# Patient Record
Sex: FEMALE | Race: WHITE | NOT HISPANIC OR LATINO
[De-identification: names, ages, dates, MRNs, and addresses within clinical notes are randomized per-mention and may not be internally consistent; named-entity substitution may affect disease eponyms.]

---

## 2017-07-22 ENCOUNTER — TRANSCRIPTION ENCOUNTER (OUTPATIENT)
Age: 47
End: 2017-07-22

## 2018-05-03 ENCOUNTER — TRANSCRIPTION ENCOUNTER (OUTPATIENT)
Age: 48
End: 2018-05-03

## 2018-11-19 ENCOUNTER — TRANSCRIPTION ENCOUNTER (OUTPATIENT)
Age: 48
End: 2018-11-19

## 2019-02-18 ENCOUNTER — TRANSCRIPTION ENCOUNTER (OUTPATIENT)
Age: 49
End: 2019-02-18

## 2019-04-21 ENCOUNTER — TRANSCRIPTION ENCOUNTER (OUTPATIENT)
Age: 49
End: 2019-04-21

## 2019-09-08 ENCOUNTER — EMERGENCY (EMERGENCY)
Facility: HOSPITAL | Age: 49
LOS: 1 days | Discharge: HOME | End: 2019-09-08
Attending: EMERGENCY MEDICINE | Admitting: EMERGENCY MEDICINE
Payer: COMMERCIAL

## 2019-09-08 VITALS
WEIGHT: 229.94 LBS | SYSTOLIC BLOOD PRESSURE: 145 MMHG | OXYGEN SATURATION: 100 % | DIASTOLIC BLOOD PRESSURE: 81 MMHG | HEIGHT: 71 IN | TEMPERATURE: 98 F | HEART RATE: 82 BPM | RESPIRATION RATE: 18 BRPM

## 2019-09-08 LAB
ALBUMIN SERPL ELPH-MCNC: 3.7 G/DL — SIGNIFICANT CHANGE UP (ref 3.3–5)
ALP SERPL-CCNC: 104 U/L — SIGNIFICANT CHANGE UP (ref 40–120)
ALT FLD-CCNC: 25 U/L — SIGNIFICANT CHANGE UP (ref 10–45)
ANION GAP SERPL CALC-SCNC: 11 MMOL/L — SIGNIFICANT CHANGE UP (ref 5–17)
APPEARANCE UR: CLEAR — SIGNIFICANT CHANGE UP
APTT BLD: 34.7 SEC — SIGNIFICANT CHANGE UP (ref 27.5–36.3)
AST SERPL-CCNC: 26 U/L — SIGNIFICANT CHANGE UP (ref 10–40)
BACTERIA # UR AUTO: ABNORMAL /HPF
BILIRUB SERPL-MCNC: 0.3 MG/DL — SIGNIFICANT CHANGE UP (ref 0.2–1.2)
BILIRUB UR-MCNC: NEGATIVE — SIGNIFICANT CHANGE UP
BUN SERPL-MCNC: 18 MG/DL — SIGNIFICANT CHANGE UP (ref 7–23)
CALCIUM SERPL-MCNC: 9.7 MG/DL — SIGNIFICANT CHANGE UP (ref 8.4–10.5)
CHLORIDE SERPL-SCNC: 102 MMOL/L — SIGNIFICANT CHANGE UP (ref 96–108)
CO2 SERPL-SCNC: 26 MMOL/L — SIGNIFICANT CHANGE UP (ref 22–31)
COLOR SPEC: YELLOW — SIGNIFICANT CHANGE UP
CREAT SERPL-MCNC: 0.9 MG/DL — SIGNIFICANT CHANGE UP (ref 0.5–1.3)
D DIMER BLD IA.RAPID-MCNC: 152 NG/ML DDU — SIGNIFICANT CHANGE UP
DIFF PNL FLD: NEGATIVE — SIGNIFICANT CHANGE UP
EPI CELLS # UR: ABNORMAL
GLUCOSE SERPL-MCNC: 123 MG/DL — HIGH (ref 70–99)
GLUCOSE UR QL: NEGATIVE — SIGNIFICANT CHANGE UP
HCT VFR BLD CALC: 43.2 % — SIGNIFICANT CHANGE UP (ref 34.5–45)
HGB BLD-MCNC: 14.1 G/DL — SIGNIFICANT CHANGE UP (ref 11.5–15.5)
INR BLD: 1.15 RATIO — SIGNIFICANT CHANGE UP (ref 0.88–1.16)
KETONES UR-MCNC: NEGATIVE — SIGNIFICANT CHANGE UP
LEUKOCYTE ESTERASE UR-ACNC: ABNORMAL
MCHC RBC-ENTMCNC: 26.7 PG — LOW (ref 27–34)
MCHC RBC-ENTMCNC: 32.6 GM/DL — SIGNIFICANT CHANGE UP (ref 32–36)
MCV RBC AUTO: 81.7 FL — SIGNIFICANT CHANGE UP (ref 80–100)
NITRITE UR-MCNC: NEGATIVE — SIGNIFICANT CHANGE UP
NRBC # BLD: 0 /100 WBCS — SIGNIFICANT CHANGE UP (ref 0–0)
PH UR: 8 — SIGNIFICANT CHANGE UP (ref 5–8)
PLATELET # BLD AUTO: 239 K/UL — SIGNIFICANT CHANGE UP (ref 150–400)
POTASSIUM SERPL-MCNC: 4 MMOL/L — SIGNIFICANT CHANGE UP (ref 3.5–5.3)
POTASSIUM SERPL-SCNC: 4 MMOL/L — SIGNIFICANT CHANGE UP (ref 3.5–5.3)
PROT SERPL-MCNC: 8 G/DL — SIGNIFICANT CHANGE UP (ref 6–8.3)
PROT UR-MCNC: 30 MG/DL
PROTHROM AB SERPL-ACNC: 12.9 SEC — SIGNIFICANT CHANGE UP (ref 10–12.9)
RBC # BLD: 5.29 M/UL — HIGH (ref 3.8–5.2)
RBC # FLD: 14.1 % — SIGNIFICANT CHANGE UP (ref 10.3–14.5)
RBC CASTS # UR COMP ASSIST: SIGNIFICANT CHANGE UP /HPF (ref 0–4)
SODIUM SERPL-SCNC: 139 MMOL/L — SIGNIFICANT CHANGE UP (ref 135–145)
SP GR SPEC: 1.01 — SIGNIFICANT CHANGE UP (ref 1.01–1.02)
TROPONIN I SERPL-MCNC: <.017 NG/ML — LOW (ref 0.02–0.06)
UROBILINOGEN FLD QL: NEGATIVE — SIGNIFICANT CHANGE UP
WBC # BLD: 10.43 K/UL — SIGNIFICANT CHANGE UP (ref 3.8–10.5)
WBC # FLD AUTO: 10.43 K/UL — SIGNIFICANT CHANGE UP (ref 3.8–10.5)
WBC UR QL: ABNORMAL /HPF (ref 0–5)

## 2019-09-08 PROCEDURE — 71045 X-RAY EXAM CHEST 1 VIEW: CPT

## 2019-09-08 PROCEDURE — 99285 EMERGENCY DEPT VISIT HI MDM: CPT

## 2019-09-08 PROCEDURE — 85027 COMPLETE CBC AUTOMATED: CPT

## 2019-09-08 PROCEDURE — 85379 FIBRIN DEGRADATION QUANT: CPT

## 2019-09-08 PROCEDURE — 85610 PROTHROMBIN TIME: CPT

## 2019-09-08 PROCEDURE — 96374 THER/PROPH/DIAG INJ IV PUSH: CPT

## 2019-09-08 PROCEDURE — 85730 THROMBOPLASTIN TIME PARTIAL: CPT

## 2019-09-08 PROCEDURE — 93005 ELECTROCARDIOGRAM TRACING: CPT

## 2019-09-08 PROCEDURE — 93010 ELECTROCARDIOGRAM REPORT: CPT

## 2019-09-08 PROCEDURE — 81001 URINALYSIS AUTO W/SCOPE: CPT

## 2019-09-08 PROCEDURE — 80053 COMPREHEN METABOLIC PANEL: CPT

## 2019-09-08 PROCEDURE — 84484 ASSAY OF TROPONIN QUANT: CPT

## 2019-09-08 PROCEDURE — 71045 X-RAY EXAM CHEST 1 VIEW: CPT | Mod: 26

## 2019-09-08 PROCEDURE — 99284 EMERGENCY DEPT VISIT MOD MDM: CPT | Mod: 25

## 2019-09-08 PROCEDURE — 87086 URINE CULTURE/COLONY COUNT: CPT

## 2019-09-08 RX ORDER — DIAZEPAM 5 MG
1 TABLET ORAL
Qty: 8 | Refills: 0
Start: 2019-09-08 | End: 2019-09-11

## 2019-09-08 RX ORDER — KETOROLAC TROMETHAMINE 30 MG/ML
30 SYRINGE (ML) INJECTION ONCE
Refills: 0 | Status: DISCONTINUED | OUTPATIENT
Start: 2019-09-08 | End: 2019-09-08

## 2019-09-08 RX ORDER — DIAZEPAM 5 MG
5 TABLET ORAL ONCE
Refills: 0 | Status: DISCONTINUED | OUTPATIENT
Start: 2019-09-08 | End: 2019-09-08

## 2019-09-08 RX ORDER — MELOXICAM 15 MG/1
1 TABLET ORAL
Qty: 14 | Refills: 0
Start: 2019-09-08 | End: 2019-09-21

## 2019-09-08 RX ADMIN — Medication 5 MILLIGRAM(S): at 16:56

## 2019-09-08 RX ADMIN — Medication 30 MILLIGRAM(S): at 17:08

## 2019-09-08 NOTE — ED PROVIDER NOTE - PHYSICAL EXAMINATION
Gen:  alert, awake, no acute distress  Head:  atraumatic, normocephalic  HEENT: PERRLA, EOMI, normal nose, normal oropharynx, no tonsillar edema, erythema, or exudate  CV:  rrr, nl S1, S2, no m/r/g  Pulm:  lungs CTA b/l  Abd: s/nt/nd, +BS  MSK:  moving all extremities, no back midline ttp, no stepoffs, no cva TTP; ttp trapezius  Neuro:  grossly intact, no focal deficits  Skin:  clear, dry, intact  Psych: AOx3, normal affect, no apparent risk to self or others

## 2019-09-08 NOTE — ED PROVIDER NOTE - OBJECTIVE STATEMENT
47 y/o F with h/o severe anxiety pw right upper and lower back muscle spasming sudden onset upon awakening today at 11:30am. States h/o muscle spasms in which she take Robaxin for but never this severe worse with inspiration. Denies chest pain, shortness of breath, abdominal pain, nausea, vomiting, dysuria, hematuria, fever, chills, cough, recent illness, recent travel.   PMD- Shani ([facility])  Nonsmoker  No OCPS 49 y/o F with h/o severe anxiety pw right upper and lower back muscle spasming sudden onset upon awakening today at 11:30am. States h/o muscle spasms in which she take Robaxin for but never this severe worse with inspiration. Denies chest pain, shortness of breath, abdominal pain, nausea, vomiting, dysuria, hematuria, fever, chills, cough, recent illness, recent travel. pain primarily in trapezius region. no urinary symptoms.  PMD- Shani ([facility])  Nonsmoker  No OCPS

## 2019-09-08 NOTE — ED PROVIDER NOTE - ATTENDING CONTRIBUTION TO CARE
Dr. Botello: I performed a face to face bedside interview with patient regarding history of present illness, review of symptoms and past medical history. I completed an independent physical exam.  I have discussed patient's plan of care with PA.   I agree with note as stated above, having amended the EMR as needed to reflect my findings.   This includes HISTORY OF PRESENT ILLNESS, HIV, PAST MEDICAL/SURGICAL/FAMILY/SOCIAL HISTORY, ALLERGIES AND HOME MEDICATIONS, REVIEW OF SYSTEMS, PHYSICAL EXAM, and any PROGRESS NOTES during the time I functioned as the attending physician for this patient.    dr botello: 49 y/o F with h/o severe anxiety pw right upper and lower back muscle spasming sudden onset upon awakening today at 11:30am. States h/o muscle spasms in which she take Robaxin for but never this severe worse with inspiration likely MSK- Will obtain basic labs with Trop and D-dimer, EKG, CXR, administer Valium and reassess. If D-dimer elevated will obtain CTA chest

## 2019-09-08 NOTE — ED PROVIDER NOTE - PROGRESS NOTE DETAILS
PA Tiberio- back pain improved with valium. Trop and D-dimer negative. UA with moderate leuks- pt without urinary symptoms. UCX sent to lab. CXR- NAPF. Will DC home on Mobic and valium with PMD follow up

## 2019-09-08 NOTE — ED ADULT TRIAGE NOTE - CHIEF COMPLAINT QUOTE
Pt c/o right sided rib/shoulder area pain , increases with deep breathing, denies injury, pt stated she took Zoloft 25 mg and 2 500 mg tylenol around 12 pm

## 2019-09-08 NOTE — ED ADULT NURSE NOTE - OBJECTIVE STATEMENT
48 yr old female to ED A&Ox3 presents with +rt rib pain that radiates to back. Pain increases with inspiration. Pt denies any injury or trauma. Pt does report that she was working out yesterday and woke up sore this am. Denies any numbness or tingling to ext. No urinary or bowel incontinence.  No acute resp distress noted. Resp even and unlabored. Abd soft, NT, ND. +BS. +PP. FERGUSON. Skin warm, dry and intact. Safety maintained.

## 2019-09-08 NOTE — ED PROVIDER NOTE - INTERPRETATION
normal sinus rhythm normal sinus rhythm, Normal axis, Normal MO interval and QRS complex. There are no acute ischemic ST or T-wave changes.

## 2019-09-08 NOTE — ED PROVIDER NOTE - NSFOLLOWUPINSTRUCTIONS_ED_ALL_ED_FT
Follow with your PMD within 48-72 hours.    Rest, no heavy lifting.    Warm compresses to area.  Light walking.   Take Mobic 15mg daily with food   Valium 5 mg every 12 hours as needed for muscle spasm- caution drowsiness/do not drive.   Worsening, continued or ANY new concerning symptoms return to the emergency department                                                                  Back Pain    WHAT YOU NEED TO KNOW:    What do I need to know about back pain? Back pain is common. You may feel sore or stiff on one or both sides of your back. The pain may spread to your buttocks or thighs.    What causes or increases my risk for back pain? Conditions that affect the spine, joints, or muscles can cause back pain. These may include arthritis, spinal stenosis (narrowing of the spinal column), muscle tension, or breakdown of the spinal discs. The following increase your risk of back pain:     Repeated bending, lifting, or twisting, or lifting heavy items      Injury from a fall or accident      Lack of regular physical activity       Obesity or pregnancy       Smoking      Aging      Driving, sitting, or standing for long periods      Bad posture while sitting or standing    How is back pain diagnosed? Your healthcare provider will ask if you have any medical conditions. He or she may ask if you have a history of back pain and how it started. He or she may watch you stand and walk, and check your range of motion. Show him or her where you feel pain and what makes it better or worse. Describe the pain, how bad it is, and how long it lasts. Tell your provider if your pain worsens at night or when you lie on your back.    How is back pain treated?    NSAIDs help decrease swelling and pain. This medicine is available with or without a doctor's order. NSAIDs can cause stomach bleeding or kidney problems in certain people. If you take blood thinner medicine, always ask your healthcare provider if NSAIDs are safe for you. Always read the medicine label and follow directions.      Acetaminophen decreases pain and fever. It is available without a doctor's order. Ask how much to take and how often to take it. Follow directions. Read the labels of all other medicines you are using to see if they also contain acetaminophen, or ask your doctor or pharmacist. Acetaminophen can cause liver damage if not taken correctly. Do not use more than 4 grams (4,000 milligrams) total of acetaminophen in one day.       Muscle relaxers help decrease muscle spasms and back pain.      Prescription pain medicine may be given. Ask your healthcare provider how to take this medicine safely. Some prescription pain medicines contain acetaminophen. Do not take other medicines that contain acetaminophen without talking to your healthcare provider. Too much acetaminophen may cause liver damage. Prescription pain medicine may cause constipation. Ask your healthcare provider how to prevent or treat constipation.     How do I manage my back pain?     Apply ice on your back for 15 to 20 minutes every hour or as directed. Use an ice pack, or put crushed ice in a plastic bag. Cover it with a towel before you apply it to your skin. Ice helps prevent tissue damage and decreases pain.      Apply heat on your back for 20 to 30 minutes every 2 hours for as many days as directed. Heat helps decrease pain and muscle spasms.      Stay active as much as you can without causing more pain. Bed rest could make your back pain worse. Avoid heavy lifting until your pain is gone.      Go to physical therapy as directed. A physical therapist can teach you exercises to help improve movement and strength, and to decrease pain.    When should I seek immediate care?     You have pain, numbness, or weakness in one or both legs.      Your pain becomes so severe that you cannot walk.      You cannot control your urine or bowel movements.      You have severe back pain with chest pain.      You have severe back pain, nausea, and vomiting.      You have severe back pain that spreads to your side or genital area.    When should I contact my healthcare provider?     You have back pain that does not get better with rest and pain medicine.      You have a fever.      You have pain that worsens when you are on your back or when you rest.      You have pain that worsens when you cough or sneeze.      You lose weight without trying.      You have questions or concerns about your condition or care.

## 2019-09-08 NOTE — ED PROVIDER NOTE - PATIENT PORTAL LINK FT
You can access the FollowMyHealth Patient Portal offered by [facility] by registering at the following website: [email or web address]. By joining [employer]’s FollowMyHealth portal, you will also be able to view your health information using other applications (apps) compatible with our system.

## 2019-09-08 NOTE — ED PROVIDER NOTE - CLINICAL SUMMARY MEDICAL DECISION MAKING FREE TEXT BOX
49 y/o F with h/o severe anxiety pw right upper and lower back muscle spasming sudden onset upon awakening today at 11:30am. States h/o muscle spasms in which she take Robaxin for but never this severe worse with inspiration likely MSK- Will obtain basic labs with Trop and D-dimer, EKG, CXR, administer Valium and reassess. If D-dimer elevated will obtain CTA chest

## 2019-09-09 ENCOUNTER — EMERGENCY (EMERGENCY)
Facility: HOSPITAL | Age: 49
LOS: 1 days | Discharge: HOME | End: 2019-09-09
Attending: EMERGENCY MEDICINE | Admitting: EMERGENCY MEDICINE
Payer: COMMERCIAL

## 2019-09-09 VITALS
DIASTOLIC BLOOD PRESSURE: 80 MMHG | TEMPERATURE: 99 F | SYSTOLIC BLOOD PRESSURE: 149 MMHG | OXYGEN SATURATION: 98 % | RESPIRATION RATE: 18 BRPM | WEIGHT: 237.88 LBS | HEIGHT: 71 IN | HEART RATE: 82 BPM

## 2019-09-09 VITALS
OXYGEN SATURATION: 100 % | HEART RATE: 76 BPM | RESPIRATION RATE: 16 BRPM | SYSTOLIC BLOOD PRESSURE: 147 MMHG | DIASTOLIC BLOOD PRESSURE: 79 MMHG

## 2019-09-09 LAB
CULTURE RESULTS: SIGNIFICANT CHANGE UP
SPECIMEN SOURCE: SIGNIFICANT CHANGE UP

## 2019-09-09 PROCEDURE — 99283 EMERGENCY DEPT VISIT LOW MDM: CPT

## 2019-09-09 RX ORDER — IBUPROFEN 200 MG
600 TABLET ORAL ONCE
Refills: 0 | Status: COMPLETED | OUTPATIENT
Start: 2019-09-09 | End: 2019-09-09

## 2019-09-09 RX ORDER — ACETAMINOPHEN 500 MG
975 TABLET ORAL ONCE
Refills: 0 | Status: COMPLETED | OUTPATIENT
Start: 2019-09-09 | End: 2019-09-09

## 2019-09-09 RX ORDER — DIAZEPAM 5 MG
5 TABLET ORAL ONCE
Refills: 0 | Status: DISCONTINUED | OUTPATIENT
Start: 2019-09-09 | End: 2019-09-09

## 2019-09-09 RX ADMIN — Medication 5 MILLIGRAM(S): at 01:39

## 2019-09-09 RX ADMIN — Medication 975 MILLIGRAM(S): at 02:09

## 2019-09-09 RX ADMIN — Medication 975 MILLIGRAM(S): at 01:39

## 2019-09-09 RX ADMIN — Medication 600 MILLIGRAM(S): at 02:10

## 2019-09-09 RX ADMIN — Medication 600 MILLIGRAM(S): at 01:40

## 2019-09-09 NOTE — ED ADULT NURSE NOTE - OBJECTIVE STATEMENT
Pt came to ED c/o right upper back pain radiating to RUQ. Pt was seen in ED yesterday and treated for same symptoms and d/c home with medications. Pt states pain is still there and is worse with movement. Pt denies sob, cp, n/v at this time.

## 2019-09-09 NOTE — ED PROVIDER NOTE - PROGRESS NOTE DETAILS
pt has been sleeping ever since arrival pt still feeling very anxious about going home, pt reassured, states that she is feeling better now but worried that pain will come back, pt encouraged to take her Zoloft and her mobic and valium at home.  Abdominal exam remains benign, no ttp, pt able to move an sit up much more easily.

## 2019-09-09 NOTE — ED PROVIDER NOTE - CLINICAL SUMMARY MEDICAL DECISION MAKING FREE TEXT BOX
pt with return trip for most certainly muscle spasm, but pt is very anxious that it might be something else, pt has pain on movement and deep inspiration, vitals normal.

## 2019-09-09 NOTE — ED PROVIDER NOTE - NSFOLLOWUPINSTRUCTIONS_ED_ALL_ED_FT
-- Follow up with your primary care physician in 48 hours.    -- Return to the ER for worsening or persistent symptoms, and/or ANY NEW OR CONCERNING SYMPTOMS.    -- If you have difficulty following up, please call: [phone] to obtain a [facility] doctor or specialist who takes your insurance in your area.

## 2019-09-09 NOTE — ED ADULT TRIAGE NOTE - CHIEF COMPLAINT QUOTE
I have pain in my right upper abdominal area and right shoulder pain. I was here yesterday for the same and gave me Mobic"

## 2019-09-09 NOTE — ED PROVIDER NOTE - PATIENT PORTAL LINK FT
You can access the FollowMyHealth Patient Portal offered by [facility] by registering at the following website: [email or web address]. By joining [employer]’s FollowMyHealth portal, you will also be able to view your health information using other applications (apps) compatible with our system.

## 2019-09-13 DIAGNOSIS — R07.81 PLEURODYNIA: ICD-10-CM

## 2019-09-14 DIAGNOSIS — R10.9 UNSPECIFIED ABDOMINAL PAIN: ICD-10-CM

## 2020-01-03 ENCOUNTER — TRANSCRIPTION ENCOUNTER (OUTPATIENT)
Age: 50
End: 2020-01-03

## 2020-02-18 ENCOUNTER — TRANSCRIPTION ENCOUNTER (OUTPATIENT)
Age: 50
End: 2020-02-18

## 2020-11-03 ENCOUNTER — EMERGENCY (EMERGENCY)
Facility: HOSPITAL | Age: 50
LOS: 1 days | Discharge: HOME | End: 2020-11-03
Attending: EMERGENCY MEDICINE | Admitting: EMERGENCY MEDICINE
Payer: COMMERCIAL

## 2020-11-03 VITALS
DIASTOLIC BLOOD PRESSURE: 94 MMHG | SYSTOLIC BLOOD PRESSURE: 184 MMHG | WEIGHT: 235.01 LBS | HEIGHT: 71 IN | TEMPERATURE: 98 F | OXYGEN SATURATION: 100 % | RESPIRATION RATE: 19 BRPM | HEART RATE: 106 BPM

## 2020-11-03 VITALS
RESPIRATION RATE: 19 BRPM | DIASTOLIC BLOOD PRESSURE: 68 MMHG | SYSTOLIC BLOOD PRESSURE: 148 MMHG | HEART RATE: 100 BPM | OXYGEN SATURATION: 100 %

## 2020-11-03 DIAGNOSIS — I10 ESSENTIAL (PRIMARY) HYPERTENSION: ICD-10-CM

## 2020-11-03 PROCEDURE — 99283 EMERGENCY DEPT VISIT LOW MDM: CPT

## 2020-11-03 RX ORDER — HYDROXYZINE HCL 10 MG
12.5 TABLET ORAL ONCE
Refills: 0 | Status: COMPLETED | OUTPATIENT
Start: 2020-11-03 | End: 2020-11-03

## 2020-11-03 RX ADMIN — Medication 12.5 MILLIGRAM(S): at 20:16

## 2020-11-03 NOTE — ED ADULT NURSE NOTE - OBJECTIVE STATEMENT
Pt comes in stating she was told by her PCP she is borderline HTN and to diet, She states she has not really done that but also suffers with anxiety. She states today she wasn't feeling well and at work her coworker took her BP and it was 170's. Having higher BP makes her anxious which probably isn't helping her bp. She states she feels a bit sob, and shaky which is something she feels with anxiety. No CP, fever, chills, n/v/d. No other symptoms. Took zoloft today and a baby aspirin

## 2020-11-03 NOTE — ED PROVIDER NOTE - OBJECTIVE STATEMENT
48 y/o 48 y/o F with PMH of anxiety on zoloft and hydroxyzine, borderline HTN (not on meds) presents to the ED for elevated BP (172/92's) while at work today. 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 reports she gets anxious every time her BP is being check and the cuff tightens. She denies CP, SOB, HA, dizziness, visual changes, extremity weakness, abd pain, n/v, f.c or all other complaints

## 2020-11-03 NOTE — ED ADULT NURSE NOTE - CHPI ED NUR SYMPTOMS NEG
no nausea/no fever/no chest pain/no chills/no back pain/no diaphoresis/no syncope/no vomiting/no dizziness/no congestion

## 2020-11-03 NOTE — ED PROVIDER NOTE - PATIENT PORTAL LINK FT
You can access the FollowMyHealth Patient Portal offered by [facility] by registering at the following website: [email or web address]. By joining [employer]’s FollowMyHealth portal, you will also be able to view your health information using other applications (apps) compatible with our system.

## 2020-11-03 NOTE — ED PROVIDER NOTE - CLINICAL SUMMARY MEDICAL DECISION MAKING FREE TEXT BOX
48 y/o F with PMH of anxiety on zoloft and hydroxyzine, borderline HTN (not on meds) presents to the ED for elevated BP (172/92's) while at work today. 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 reports she gets anxious every time her BP is being check and the cuff tightens. She denies CP, SOB, HA, dizziness, visual changes, extremity weakness, abd pain, n/v, f.c or all other complaints. PE unremarkable. Patient asymptomatic. Repeat BP was 152/79 without intervention. Patient only took half of her hydroxyzine this morning, she wants the other half now. Repeat BP after hydroxyzine 148/68. patient will schedule an appt with her PCP for f/u

## 2020-11-03 NOTE — ED ADULT TRIAGE NOTE - CHIEF COMPLAINT QUOTE
Pt presents to the ED with complaints of HTN and anxiety, pt states that she has a h/o anxiety and the HTN is making her more anxiety, pt state BP borderline but is not on medication for it at this time. Denies SI/HI. Denies chest pain, denies  headache. Pt states coworker took BP at 5PM and it was 172/92

## 2020-11-03 NOTE — ED PROVIDER NOTE - ATTENDING CONTRIBUTION TO CARE
pt c borderline HTN c h/o anxiety c/o BP 170s/90s today. states she had stressful day.  no chest pain/sob. no weakness, numbness, speech or vision changes    exam:   General: mildly anxious. nad  HEENT: eyes perrl,   cor: RRR, s1s2, 2+rad pulses. 2+ rad pulses  lungs: ctabl, no resp distress.   abd: soft, ntnd.   neuro: a&ox3, cn2-12 intact, FERGUSON, 5/5 strength c nl sensation all extremities, nl coordination.   MSK: no extremity swelling.  Skin: normal, no rash    AP: asymptomatic HTN. no s/s of end organ damage. will reassess bp. if improved, can dc c outpt pmd fu

## 2020-11-23 ENCOUNTER — EMERGENCY (EMERGENCY)
Facility: HOSPITAL | Age: 50
LOS: 1 days | Discharge: HOME | End: 2020-11-23
Attending: EMERGENCY MEDICINE | Admitting: EMERGENCY MEDICINE
Payer: COMMERCIAL

## 2020-11-23 VITALS
SYSTOLIC BLOOD PRESSURE: 165 MMHG | DIASTOLIC BLOOD PRESSURE: 82 MMHG | OXYGEN SATURATION: 100 % | WEIGHT: 229.94 LBS | RESPIRATION RATE: 18 BRPM | HEART RATE: 95 BPM | TEMPERATURE: 98 F | HEIGHT: 71 IN

## 2020-11-23 VITALS
HEART RATE: 83 BPM | OXYGEN SATURATION: 100 % | SYSTOLIC BLOOD PRESSURE: 167 MMHG | DIASTOLIC BLOOD PRESSURE: 80 MMHG | RESPIRATION RATE: 18 BRPM

## 2020-11-23 DIAGNOSIS — I10 ESSENTIAL (PRIMARY) HYPERTENSION: ICD-10-CM

## 2020-11-23 PROCEDURE — 99282 EMERGENCY DEPT VISIT SF MDM: CPT

## 2020-11-23 PROCEDURE — 99283 EMERGENCY DEPT VISIT LOW MDM: CPT

## 2020-11-23 NOTE — ED ADULT NURSE NOTE - CHIEF COMPLAINT QUOTE
Patient states her face feels hot and that she is hypertensive. Recently started on amlodipine for hypertension. Patient w hx anxiety

## 2020-11-23 NOTE — ED PROVIDER NOTE - CONSTITUTIONAL, MLM
normal... Well appearing, awake, alert, oriented to person, place, time/situation and in no apparent distress. mildly anxious

## 2020-11-23 NOTE — ED PROVIDER NOTE - OBJECTIVE STATEMENT
pt c/o high BP tonight, moderate , diastolic ~107.  assoc c anxiety.  pt dx c htn recently, was started on norvasc 5 mg.  bp has been high and pmd told pt to increase to 10mg qd.  pt noting redness and flushing feeling in face and ears recently.  no chest pain, sob. no headache, n/v. no weakness, numbness, speech or vision changes.  no si/hi/hallucinations.  no other new meds

## 2020-11-23 NOTE — ED PROVIDER NOTE - PATIENT PORTAL LINK FT
You can access the FollowMyHealth Patient Portal offered by [facility] by registering at the following website: [email or web address]. By joining [employer]’s FollowMyHealth portal, you will also be able to view your health information using other applications (apps) compatible with our system.

## 2020-11-23 NOTE — ED PROVIDER NOTE - ENMT, MLM
Airway patent, Nasal mucosa clear. Mouth with normal mucosa. Throat has no vesicles, no oropharyngeal exudates and uvula is midline. no oropharyngeal or tongue swelling.   mild generalized facial and ear erythema, nontender. no increased warmth

## 2020-11-23 NOTE — ED PROVIDER NOTE - CLINICAL SUMMARY MEDICAL DECISION MAKING FREE TEXT BOX
high bp today with anxiety, facial flushing.  no s/s of hypertensive emergency.  facial flushing possibly side effect of norvasc. told pt to f/u pmd for possible med change.

## 2020-11-23 NOTE — ED PROVIDER NOTE - NSFOLLOWUPINSTRUCTIONS_ED_ALL_ED_FT
-continue amlodipine for now. speak with your doctor tomorrow about possibly changing medications for blood pressure for possible facial flushing as side effect of amlodipine.      Follow Up in 1-3 Days with your own doctor or with  [facility]  [address] [address]  Phone: [phone]      Hypertension    WHAT YOU NEED TO KNOW:    Hypertension is high blood pressure. Your blood pressure is the force of your blood moving against the walls of your arteries. Hypertension causes your blood pressure to get so high that your heart has to work much harder than normal. This can damage your heart. The cause of hypertension may not be known. This is called essential or primary hypertension. Hypertension caused by another medical condition, such as kidney disease, is called secondary hypertension.    DISCHARGE INSTRUCTIONS:    Call 911 for any of the following:   •You have chest pain.      •You have any of the following signs of a heart attack: ?Squeezing, pressure, or pain in your chest      ?You may also have any of the following: ?Discomfort or pain in your back, neck, jaw, stomach, or arm      ?Shortness of breath      ?Nausea or vomiting      ?Lightheadedness or a sudden cold sweat        •You become confused or have difficulty speaking.      •You suddenly feel lightheaded or have trouble breathing.      Return to the emergency department if:   •You have a severe headache or vision loss.      •You have weakness in an arm or leg.       Contact your healthcare provider if:   •You feel faint, dizzy, confused, or drowsy.       •You have been taking your blood pressure medicine but your pressure is higher than your provider says it should be.      •You have questions or concerns about your condition or care.      Medicines: You may need any of the following:   •Antihypertensives may be used to help lower your blood pressure. Several kinds of medicines are available. Your healthcare provider will choose medicines based on the kind of hypertension you have. You may need more than one type of medicine. Take the medicine exactly as directed.       •Diuretics help decrease extra fluid that collects in your body. This will help lower your blood pressure. You may urinate more often while you take this medicine.      •Cholesterol medicine helps lower your cholesterol level. A low cholesterol level helps prevent heart disease and makes it easier to control your blood pressure.       •Take your medicine as directed. Contact your healthcare provider if you think your medicine is not helping or if you have side effects. Tell him or her if you are allergic to any medicine. Keep a list of the medicines, vitamins, and herbs you take. Include the amounts, and when and why you take them. Bring the list or the pill bottles to follow-up visits. Carry your medicine list with you in case of an emergency.      Follow up with your healthcare provider as directed: You will need to return to have your blood pressure checked and to have other lab tests done. Write down your questions so you remember to ask them during your visits.     Stages of hypertension:     Blood Pressure Readings     •Normal blood pressure is 119/79 or lower. Your healthcare provider may only check your blood pressure each year if it stays at a normal level.      •Elevated blood pressure is 120/79 to 129/79. This is sometimes called prehypertension. Your healthcare provider may suggest lifestyle changes to help lower your blood pressure to a normal level. He or she may then check it again in 3 to 6 months.      •Stage 1 hypertension is 130/80 to 139/89. Your provider may recommend lifestyle changes, medication, and checks every 3 to 6 months until your blood pressure is controlled.      •Stage 2 hypertension is 140/90 or higher. Your provider will recommend lifestyle changes and have you take 2 kinds of hypertension medicines. You will also need to have your blood pressure checked monthly until it is controlled.      Manage hypertension:   •Check your blood pressure at home. Avoid smoking, caffeine, and exercise at least 30 minutes before checking your blood pressure. Sit and rest for 5 minutes before you take your blood pressure. Extend your arm and support it on a flat surface. Your arm should be at the same level as your heart. Follow the directions that came with your blood pressure monitor. Check your blood pressure 2 times, 1 minute apart, before you take your medicine in the morning. Also check your blood pressure before your evening meal. Keep a record of your readings and bring it to your follow-up visits. Ask your healthcare provider what your blood pressure should be.   How to take a Blood Pressure           •Manage any other health conditions you have. Health conditions such as diabetes can increase your risk for hypertension. Follow your healthcare provider's instructions and take all your medicines as directed.       •Ask about all medicines. Certain medicines can increase your blood pressure. Examples include oral birth control pills, decongestants, herbal supplements, and NSAIDs, such as ibuprofen. Your healthcare provider can tell you which medicines are safe for you to take. This includes prescription and over-the-counter medicines.      Lifestyle changes you can make to manage hypertension:   •Limit sodium (salt) as directed. Too much sodium can affect your fluid balance. Check labels to find low-sodium or no-salt-added foods. Some low-sodium foods use potassium salts for flavor. Too much potassium can also cause health problems. Your healthcare provider will tell you how much sodium and potassium are safe for you to have in a day. He or she may recommend that you limit sodium to 2,300 mg a day.             •Follow the meal plan recommended by your healthcare provider. A dietitian or your provider can give you more information on low-sodium plans or the DASH (Dietary Approaches to Stop Hypertension) eating plan. The DASH plan is low in sodium, unhealthy fats, and total fat. It is high in potassium, calcium, and fiber.              •Exercise to maintain a healthy weight. Exercise at least 30 minutes per day, on most days of the week. This will help decrease your blood pressure. Ask your healthcare provider about the best exercise plan for you.   Walking for Exercise           •Decrease stress. This may help lower your blood pressure. Learn ways to relax, such as deep breathing or listening to music.      •Limit alcohol as directed. Alcohol can increase your blood pressure. A drink of alcohol is 12 ounces of beer, 5 ounces of wine, or 1½ ounces of liquor.      •Do not smoke. Nicotine and other chemicals in cigarettes and cigars can increase your blood pressure and also cause lung damage. Ask your healthcare provider for information if you currently smoke and need help to quit. E-cigarettes or smokeless tobacco still contain nicotine. Talk to your healthcare provider before you use these products.   Prevent Heart Disease

## 2020-11-23 NOTE — ED ADULT TRIAGE NOTE - CHIEF COMPLAINT QUOTE
Patient states her face feels hot and that she is hypertensive./ Recently started on amlodipine for hypertension. Patient w hx anxiety Patient states her face feels hot and that she is hypertensive. Recently started on amlodipine for hypertension. Patient w hx anxiety

## 2020-11-24 PROBLEM — F41.9 ANXIETY DISORDER, UNSPECIFIED: Chronic | Status: ACTIVE | Noted: 2020-11-03

## 2020-11-24 PROBLEM — F32.9 MAJOR DEPRESSIVE DISORDER, SINGLE EPISODE, UNSPECIFIED: Chronic | Status: ACTIVE | Noted: 2020-11-03

## 2020-11-24 PROBLEM — Q61.3 POLYCYSTIC KIDNEY, UNSPECIFIED: Chronic | Status: ACTIVE | Noted: 2020-11-03

## 2020-12-13 ENCOUNTER — EMERGENCY (EMERGENCY)
Facility: HOSPITAL | Age: 50
LOS: 1 days | Discharge: HOME | End: 2020-12-13
Attending: EMERGENCY MEDICINE | Admitting: EMERGENCY MEDICINE
Payer: COMMERCIAL

## 2020-12-13 VITALS
RESPIRATION RATE: 17 BRPM | SYSTOLIC BLOOD PRESSURE: 171 MMHG | WEIGHT: 233.91 LBS | HEART RATE: 99 BPM | TEMPERATURE: 98 F | OXYGEN SATURATION: 100 % | HEIGHT: 71 IN | DIASTOLIC BLOOD PRESSURE: 87 MMHG

## 2020-12-13 DIAGNOSIS — T46.1X5A ADVERSE EFFECT OF CALCIUM-CHANNEL BLOCKERS, INITIAL ENCOUNTER: ICD-10-CM

## 2020-12-13 PROCEDURE — 99284 EMERGENCY DEPT VISIT MOD MDM: CPT

## 2020-12-13 PROCEDURE — 99282 EMERGENCY DEPT VISIT SF MDM: CPT

## 2020-12-13 NOTE — ED PROVIDER NOTE - PATIENT PORTAL LINK FT
You can access the FollowMyHealth Patient Portal offered by [facility] by registering at the following website: [email or web address]. By joining [employer]’s FollowMyHealth portal, you will also be able to view your health information using other applications (apps) compatible with our system.

## 2020-12-13 NOTE — ED PROVIDER NOTE - ATTENDING CONTRIBUTION TO CARE
Dr. Apodaca: I performed a face to face bedside interview with patient regarding history of present illness, review of symptoms and past medical history. I completed an independent physical exam.  I have discussed patient's plan of care with PA.   I agree with note as stated above, having amended the EMR as needed to reflect my findings.   This includes HISTORY OF PRESENT ILLNESS, HIV, PAST MEDICAL/SURGICAL/FAMILY/SOCIAL HISTORY, ALLERGIES AND HOME MEDICATIONS, REVIEW OF SYSTEMS, PHYSICAL EXAM, and any PROGRESS NOTES during the time I functioned as the attending physician for this patient.    see mdm

## 2020-12-13 NOTE — ED PROVIDER NOTE - CLINICAL SUMMARY MEDICAL DECISION MAKING FREE TEXT BOX
Dr. Apodaca: 50F h/o HTN and anxiety, took her amlodipine 10mg this AM and unintentionally took it at 430pm. No SI. Has no sx but was concerned because read online that this could be fatal. On exam pt is well appearing, nad, anxious, rrr, ctab, abdo soft/nt/nd. Pt with no hypotension, will dc home with reassurance.

## 2020-12-13 NOTE — ED PROVIDER NOTE - CONSTITUTIONAL, MLM
normal... Well appearing, awake, alert, oriented to person, place, time/situation. pt is very anxious

## 2020-12-13 NOTE — ED PROVIDER NOTE - OBJECTIVE STATEMENT
pt 51 yo f hx HTN and anxiety presents to the ED because she accidently took 2 of her amlodipine 10mg tablets today. pt was prescribed losartan 1 week ago but hasn't started it. currently does not have any symptoms

## 2021-09-01 ENCOUNTER — EMERGENCY (EMERGENCY)
Facility: HOSPITAL | Age: 51
LOS: 1 days | Discharge: HOME | End: 2021-09-01
Attending: INTERNAL MEDICINE | Admitting: INTERNAL MEDICINE
Payer: COMMERCIAL

## 2021-09-01 VITALS
HEIGHT: 71 IN | SYSTOLIC BLOOD PRESSURE: 150 MMHG | HEART RATE: 86 BPM | DIASTOLIC BLOOD PRESSURE: 85 MMHG | RESPIRATION RATE: 16 BRPM | OXYGEN SATURATION: 100 % | WEIGHT: 240.08 LBS | TEMPERATURE: 98 F

## 2021-09-01 LAB — HCG SERPL-ACNC: <1 MIU/ML — SIGNIFICANT CHANGE UP

## 2021-09-01 PROCEDURE — 36415 COLL VENOUS BLD VENIPUNCTURE: CPT

## 2021-09-01 PROCEDURE — 84702 CHORIONIC GONADOTROPIN TEST: CPT

## 2021-09-01 PROCEDURE — 99283 EMERGENCY DEPT VISIT LOW MDM: CPT

## 2021-09-01 PROCEDURE — 99284 EMERGENCY DEPT VISIT MOD MDM: CPT

## 2021-09-01 NOTE — ED PROVIDER NOTE - ATTENDING CONTRIBUTION TO CARE
pt 49 yo f states she is 2 weeks late for her menses. took multiple home pregnancy tests which have been negative. wants a blood pregnancy test. does want to be pregnant  denies pain, discharge, fever, chills, n/v   hcg neg. Discussed with patient need to return to ED if symptoms don't continue to improve or recur or develops any new or worsening symptoms that are of concern.  Dr. Greer:  I have reviewed and discussed with the PA/ resident the case specifics, including the history, physical assessment, evaluation, conclusion, laboratory results, and medical plan. I agree with the contents, and conclusions. I have personally examined, and interviewed the patient.

## 2021-09-01 NOTE — ED PROVIDER NOTE - PATIENT PORTAL LINK FT
You can access the FollowMyHealth Patient Portal offered by [facility] by registering at the following website: [email or web address]. By joining [employer]’s FollowMyHealth portal, you will also be able to view your health information using other applications (apps) compatible with our system.

## 2021-09-01 NOTE — ED PROVIDER NOTE - PHYSICAL EXAMINATION
Gen: Well appearing in NAD  Head: NC/AT  Neck: trachea midline  Resp:  No distress  Ext: no deformities  Neuro:  A&O appears non focal  Abd: soft NT ND  Skin:  Warm and dry as visualized  Psych:  Normal affect and mood

## 2021-09-01 NOTE — ED PROVIDER NOTE - CLINICAL SUMMARY MEDICAL DECISION MAKING FREE TEXT BOX
pt 51 yo f states she is 2 weeks late for her menses. took multiple home pregnancy tests which have been negative. wants a blood pregnancy test. does want to be pregnant  denies pain, discharge, fever, chills, n/v   hcg neg. Discussed with patient need to return to ED if symptoms don't continue to improve or recur or develops any new or worsening symptoms that are of concern.

## 2021-09-01 NOTE — ED PROVIDER NOTE - OBJECTIVE STATEMENT
pt 49 yo f states she is 2 weeks late for her menses. took multiple home pregnancy tests which have been negative. wants a blood pregnancy test. does want to be pregnant  denies pain, discharge, fever, chills, n/v

## 2021-09-01 NOTE — ED ADULT NURSE NOTE - NSIMPLEMENTINTERV_GEN_ALL_ED
Implemented All Universal Safety Interventions:  [place] to call system. Call bell, personal items and telephone within reach. Instruct patient to call for assistance. Room bathroom lighting operational. Non-slip footwear when patient is off stretcher. Physically safe environment: no spills, clutter or unnecessary equipment. Stretcher in lowest position, wheels locked, appropriate side rails in place.

## 2021-09-01 NOTE — ED ADULT NURSE NOTE - OBJECTIVE STATEMENT
Patient presents to ED for evaluation of missed period x approximately 2.5 weeks. Patient states mild intermittent abdominal cramping over the last 1-2 months but denies pain right now. Denies urinary symptoms. Pregnancy tests at home (urine) have been negative.

## 2022-04-03 ENCOUNTER — EMERGENCY (EMERGENCY)
Facility: HOSPITAL | Age: 52
LOS: 1 days | Discharge: HOME | End: 2022-04-03
Attending: EMERGENCY MEDICINE | Admitting: EMERGENCY MEDICINE
Payer: COMMERCIAL

## 2022-04-03 VITALS
HEART RATE: 83 BPM | TEMPERATURE: 98 F | WEIGHT: 240.08 LBS | HEIGHT: 71 IN | RESPIRATION RATE: 16 BRPM | SYSTOLIC BLOOD PRESSURE: 163 MMHG | DIASTOLIC BLOOD PRESSURE: 76 MMHG | OXYGEN SATURATION: 98 %

## 2022-04-03 PROCEDURE — 73630 X-RAY EXAM OF FOOT: CPT

## 2022-04-03 PROCEDURE — 73030 X-RAY EXAM OF SHOULDER: CPT

## 2022-04-03 PROCEDURE — 73030 X-RAY EXAM OF SHOULDER: CPT | Mod: 26,RT

## 2022-04-03 PROCEDURE — 73630 X-RAY EXAM OF FOOT: CPT | Mod: 26,LT

## 2022-04-03 PROCEDURE — 99284 EMERGENCY DEPT VISIT MOD MDM: CPT

## 2022-04-03 PROCEDURE — 99284 EMERGENCY DEPT VISIT MOD MDM: CPT | Mod: 25

## 2022-04-03 RX ORDER — IBUPROFEN 200 MG
600 TABLET ORAL ONCE
Refills: 0 | Status: COMPLETED | OUTPATIENT
Start: 2022-04-03 | End: 2022-04-03

## 2022-04-03 NOTE — ED PROVIDER NOTE - NSFOLLOWUPINSTRUCTIONS_ED_ALL_ED_FT
Rest, elevate   Take motrin 600mg every 6 hours as needed for pain   Return to the ED if any worsening or persistent symptoms.         Shoulder Pain    WHAT YOU NEED TO KNOW:    Shoulder pain is a common problem that can affect your daily activities. Pain can be caused by a problem within your shoulder, such as soreness of a tendon or bursa. A tendon is a cord of tough tissue that connects your muscles to your bones. The bursa is a fluid-filled sac that acts as a cushion between a bone and a tendon. Shoulder pain may also be caused by pain that spreads to your shoulder from another part of your body.  Shoulder Anatomy         DISCHARGE INSTRUCTIONS:    Return to the emergency department if:   •You have severe pain.      •You cannot move your arm or shoulder.      •You have numbness or tingling in your shoulder or arm.      Contact your healthcare provider if:   •Your pain gets worse or does not go away with treatment.      •You have trouble moving your arm or shoulder.      •You have questions or concerns about your condition or care.      Medicines: You may need any of the following:   •Acetaminophen decreases pain and fever. It is available without a doctor's order. Ask how much to take and how often to take it. Follow directions. Read the labels of all other medicines you are using to see if they also contain acetaminophen, or ask your doctor or pharmacist. Acetaminophen can cause liver damage if not taken correctly. Do not use more than 4 grams (4,000 milligrams) total of acetaminophen in one day.       •NSAIDs, such as ibuprofen, help decrease swelling, pain, and fever. This medicine is available with or without a doctor's order. NSAIDs can cause stomach bleeding or kidney problems in certain people. If you take blood thinner medicine, always ask your healthcare provider if NSAIDs are safe for you. Always read the medicine label and follow directions.      •Take your medicine as directed. Contact your healthcare provider if you think your medicine is not helping or if you have side effects. Tell him of her if you are allergic to any medicine. Keep a list of the medicines, vitamins, and herbs you take. Include the amounts, and when and why you take them. Bring the list or the pill bottles to follow-up visits. Carry your medicine list with you in case of an emergency.      Manage your symptoms:   •Apply ice on your shoulder for 20 to 30 minutes every 2 hours or as directed. Use an ice pack, or put crushed ice in a plastic bag. Cover it with a towel before you apply it to your shoulder. Ice helps prevent tissue damage and decreases swelling and pain.      •Apply heat if ice does not help your symptoms. Apply heat on your shoulder for 20 to 30 minutes every 2 hours for as many days as directed. Heat helps decrease pain and muscle spasms.      •Limit activities as directed. Try to avoid repeated overhead movements.      •Go to physical or occupational therapy as directed. A physical therapist teaches you exercises to help improve movement and strength, and to decrease pain. An occupational therapist teaches you skills to help with your daily activities.       Prevent shoulder pain:   •Maintain a good range of motion in your shoulder. Ask your healthcare provider which exercises you should do on a regular basis after you have healed.       •Stretch and strengthen your shoulder. Use proper technique during exercises and sports.      Follow up with your healthcare provider or orthopedist as directed: Write down your questions so you remember to ask them during your visits.        © Copyright [employer] 2022           back to top                          © Copyright [employer] 2022

## 2022-04-03 NOTE — ED PROVIDER NOTE - OBJECTIVE STATEMENT
51 yr old female presents with right shoulder pain x 3 months, worse with movement above shoulder and lifting anything. Pt also c/o left heel pain x 2 weeks. Pt reports pain started after using new shoes. Pt has been walking and has not rested since pain. Denies any fall or injuries. Pt requesting xray to right shoulder and left foot. Pt denies taking any pain meds.

## 2022-04-03 NOTE — ED PROVIDER NOTE - NS ED ATTENDING STATEMENT MOD
This was a shared visit with the JULIO C. I reviewed and verified the documentation and independently performed the documented:

## 2022-04-03 NOTE — ED PROVIDER NOTE - PHYSICAL EXAMINATION
right shoulder- pain with extension above shoulder, no point tenderness, positive ROM   left heel- no point tenderness, positive 2+ pedal pulse, less than 2 sec cap refill

## 2022-04-03 NOTE — ED PROVIDER NOTE - CLINICAL SUMMARY MEDICAL DECISION MAKING FREE TEXT BOX
51 yr old female presents with right shoulder pain x 3 months, worse with movement above shoulder and lifting anything. Pt also c/o left heel pain x 2 weeks. Pt reports pain started after using new shoes. Pt has been walking and has not rested since pain. Denies any fall or injuries. Pt requesting xray to right shoulder and left foot. Pt denies taking any pain meds.  right shoulder- pain with extension above shoulder, no point tenderness, positive ROM   left heel- no point tenderness, positive 2+ pedal pulse, less than 2 sec cap refill    xray - 51 yr old female presents with right shoulder pain x 3 months, worse with movement above shoulder and lifting anything. Pt also c/o left heel pain x 2 weeks. Pt reports pain started after using new shoes. Pt has been walking and has not rested since pain. Denies any fall or injuries. Pt requesting xray to right shoulder and left foot. Pt denies taking any pain meds.  right shoulder- pain with extension above shoulder, no point tenderness, positive ROM   left heel- no point tenderness, positive 2+ pedal pulse, less than 2 sec cap refill    xray - showing no acute findings    RICE, nsaids. stable for dc with orthopedics follow up.

## 2022-04-03 NOTE — ED ADULT NURSE NOTE - OBJECTIVE STATEMENT
Patient complaint of having R shoulder pain stating to have started 3 weeks and has gotten worse, she complaints of having pain when lifting her arms. She complaint of L foot pain in the heel stating it was hard to walk on. Patient denies any recent falls or head injury, denies nausea or vomiting.

## 2022-04-03 NOTE — ED PROVIDER NOTE - ATTENDING CONTRIBUTION TO CARE
I, Hillary Johnson MD, performed the initial face to face bedside interview with this patient regarding history of present illness, review of symptoms and relevant past medical, social and family history.  I completed an independent physical examination.  I was the initial provider who evaluated this patient. I have signed out the follow up of any pending tests (i.e. labs, radiological studies) to the ACP.  I have communicated the patient’s plan of care and disposition with the ACP.  The history, relevant review of systems, past medical and surgical history, medical decision making, and physical examination was documented by the scribe in my presence and I attest to the accuracy of the documentation.

## 2022-04-03 NOTE — ED PROVIDER NOTE - PATIENT PORTAL LINK FT
You can access the FollowMyHealth Patient Portal offered by [facility] by registering at the following website: [email or web address]. By joining [employer]’s FollowMyHealth portal, you will also be able to view your health information using other applications (apps) compatible with our system.

## 2022-06-24 ENCOUNTER — EMERGENCY (EMERGENCY)
Facility: HOSPITAL | Age: 52
LOS: 1 days | Discharge: HOME | End: 2022-06-24
Attending: STUDENT IN AN ORGANIZED HEALTH CARE EDUCATION/TRAINING PROGRAM | Admitting: STUDENT IN AN ORGANIZED HEALTH CARE EDUCATION/TRAINING PROGRAM
Payer: COMMERCIAL

## 2022-06-24 VITALS
RESPIRATION RATE: 16 BRPM | HEART RATE: 93 BPM | SYSTOLIC BLOOD PRESSURE: 156 MMHG | OXYGEN SATURATION: 100 % | TEMPERATURE: 99 F | HEIGHT: 71 IN | WEIGHT: 235.01 LBS | DIASTOLIC BLOOD PRESSURE: 87 MMHG

## 2022-06-24 LAB
ALBUMIN SERPL ELPH-MCNC: 3.6 G/DL — SIGNIFICANT CHANGE UP (ref 3.3–5)
ALP SERPL-CCNC: 96 U/L — SIGNIFICANT CHANGE UP (ref 40–120)
ALT FLD-CCNC: 18 U/L — SIGNIFICANT CHANGE UP (ref 10–45)
ANION GAP SERPL CALC-SCNC: 7 MMOL/L — SIGNIFICANT CHANGE UP (ref 5–17)
AST SERPL-CCNC: 37 U/L — SIGNIFICANT CHANGE UP (ref 10–40)
BASOPHILS # BLD AUTO: 0.05 K/UL — SIGNIFICANT CHANGE UP (ref 0–0.2)
BASOPHILS NFR BLD AUTO: 0.5 % — SIGNIFICANT CHANGE UP (ref 0–2)
BILIRUB SERPL-MCNC: 0.4 MG/DL — SIGNIFICANT CHANGE UP (ref 0.2–1.2)
BLD GP AB SCN SERPL QL: SIGNIFICANT CHANGE UP
BUN SERPL-MCNC: 15 MG/DL — SIGNIFICANT CHANGE UP (ref 7–23)
CALCIUM SERPL-MCNC: 9.5 MG/DL — SIGNIFICANT CHANGE UP (ref 8.4–10.5)
CHLORIDE SERPL-SCNC: 104 MMOL/L — SIGNIFICANT CHANGE UP (ref 96–108)
CO2 SERPL-SCNC: 28 MMOL/L — SIGNIFICANT CHANGE UP (ref 22–31)
CREAT SERPL-MCNC: 0.72 MG/DL — SIGNIFICANT CHANGE UP (ref 0.5–1.3)
EGFR: 102 ML/MIN/1.73M2 — SIGNIFICANT CHANGE UP
EOSINOPHIL # BLD AUTO: 0.09 K/UL — SIGNIFICANT CHANGE UP (ref 0–0.5)
EOSINOPHIL NFR BLD AUTO: 0.9 % — SIGNIFICANT CHANGE UP (ref 0–6)
GLUCOSE SERPL-MCNC: 102 MG/DL — HIGH (ref 70–99)
HCG SERPL-ACNC: 1 MIU/ML — SIGNIFICANT CHANGE UP
HCT VFR BLD CALC: 40.7 % — SIGNIFICANT CHANGE UP (ref 34.5–45)
HGB BLD-MCNC: 13 G/DL — SIGNIFICANT CHANGE UP (ref 11.5–15.5)
IMM GRANULOCYTES NFR BLD AUTO: 0.5 % — SIGNIFICANT CHANGE UP (ref 0–1.5)
LIDOCAIN IGE QN: 68 U/L — LOW (ref 73–393)
LYMPHOCYTES # BLD AUTO: 1.48 K/UL — SIGNIFICANT CHANGE UP (ref 1–3.3)
LYMPHOCYTES # BLD AUTO: 15.2 % — SIGNIFICANT CHANGE UP (ref 13–44)
MCHC RBC-ENTMCNC: 25.1 PG — LOW (ref 27–34)
MCHC RBC-ENTMCNC: 31.9 GM/DL — LOW (ref 32–36)
MCV RBC AUTO: 78.7 FL — LOW (ref 80–100)
MONOCYTES # BLD AUTO: 0.43 K/UL — SIGNIFICANT CHANGE UP (ref 0–0.9)
MONOCYTES NFR BLD AUTO: 4.4 % — SIGNIFICANT CHANGE UP (ref 2–14)
NEUTROPHILS # BLD AUTO: 7.63 K/UL — HIGH (ref 1.8–7.4)
NEUTROPHILS NFR BLD AUTO: 78.5 % — HIGH (ref 43–77)
NRBC # BLD: 0 /100 WBCS — SIGNIFICANT CHANGE UP (ref 0–0)
PLATELET # BLD AUTO: 217 K/UL — SIGNIFICANT CHANGE UP (ref 150–400)
POTASSIUM SERPL-MCNC: 4.4 MMOL/L — SIGNIFICANT CHANGE UP (ref 3.5–5.3)
POTASSIUM SERPL-SCNC: 4.4 MMOL/L — SIGNIFICANT CHANGE UP (ref 3.5–5.3)
PROT SERPL-MCNC: 7.7 G/DL — SIGNIFICANT CHANGE UP (ref 6–8.3)
RBC # BLD: 5.17 M/UL — SIGNIFICANT CHANGE UP (ref 3.8–5.2)
RBC # FLD: 15.1 % — HIGH (ref 10.3–14.5)
SODIUM SERPL-SCNC: 139 MMOL/L — SIGNIFICANT CHANGE UP (ref 135–145)
WBC # BLD: 9.73 K/UL — SIGNIFICANT CHANGE UP (ref 3.8–10.5)
WBC # FLD AUTO: 9.73 K/UL — SIGNIFICANT CHANGE UP (ref 3.8–10.5)

## 2022-06-24 PROCEDURE — 76830 TRANSVAGINAL US NON-OB: CPT

## 2022-06-24 PROCEDURE — 86900 BLOOD TYPING SEROLOGIC ABO: CPT

## 2022-06-24 PROCEDURE — 86850 RBC ANTIBODY SCREEN: CPT

## 2022-06-24 PROCEDURE — 99285 EMERGENCY DEPT VISIT HI MDM: CPT

## 2022-06-24 PROCEDURE — 83690 ASSAY OF LIPASE: CPT

## 2022-06-24 PROCEDURE — 86901 BLOOD TYPING SEROLOGIC RH(D): CPT

## 2022-06-24 PROCEDURE — 36415 COLL VENOUS BLD VENIPUNCTURE: CPT

## 2022-06-24 PROCEDURE — 85025 COMPLETE CBC W/AUTO DIFF WBC: CPT

## 2022-06-24 PROCEDURE — 76830 TRANSVAGINAL US NON-OB: CPT | Mod: 26

## 2022-06-24 PROCEDURE — 84702 CHORIONIC GONADOTROPIN TEST: CPT

## 2022-06-24 PROCEDURE — 99284 EMERGENCY DEPT VISIT MOD MDM: CPT | Mod: 25

## 2022-06-24 PROCEDURE — 80053 COMPREHEN METABOLIC PANEL: CPT

## 2022-06-24 NOTE — ED ADULT NURSE NOTE - CHIEF COMPLAINT QUOTE
BIB EMS from home for c/o heavy vaginal bleeding x 5 days with multiple blood clots. Denies taking any blood thinners. LMP 5/3/22. Pt with recent dx of hemorraghic cyst on ovary.

## 2022-06-24 NOTE — ED PROVIDER NOTE - NSFOLLOWUPINSTRUCTIONS_ED_ALL_ED_FT
Follow up with your private OBGYN as scheduled for next week   Return to the ED if any worsening or persistent symptoms.         Dysfunctional Uterine Bleeding      Dysfunctional uterine bleeding is abnormal bleeding from the uterus. Dysfunctional uterine bleeding includes:  •A menstrual period that comes earlier or later than usual.      •A menstrual period that is lighter or heavier than usual, or has large blood clots.      •Vaginal bleeding between menstrual periods.      •Skipping one or more menstrual periods.      •Vaginal bleeding after sex.      •Vaginal bleeding after menopause.        Follow these instructions at home:      Eating and drinking      •Eat well-balanced meals. Include foods that are high in iron, such as liver, meat, shellfish, green leafy vegetables, and eggs.    •To prevent or treat constipation, your health care provider may recommend that you:  •Drink enough fluid to keep your urine pale yellow.      •Take over-the-counter or prescription medicines.      •Eat foods that are high in fiber, such as beans, whole grains, and fresh fruits and vegetables.      •Limit foods that are high in fat and processed sugars, such as fried or sweet foods.        Medicines     •Take over-the-counter and prescription medicines only as told by your health care provider.      • Do not change medicines without talking with your health care provider.    •Aspirin or medicines that contain aspirin may make the bleeding worse. Do not take those medicines:  •During the week before your menstrual period.      •During your menstrual period.        •If you were prescribed iron pills, take them as told by your health care provider. Iron pills help to replace iron that your body loses because of this condition.      Activity   •If you need to change your sanitary pad or tampon more than one time every 2 hours:  •Lie in bed with your feet raised (elevated).      •Place a cold pack on your lower abdomen.      •Rest as much as possible until the bleeding stops or slows down.        • Do not try to lose weight until the bleeding has stopped and your blood iron level is back to normal.        General instructions    •For two months, write down:  •When your menstrual period starts.      •When your menstrual period ends.      •When any abnormal vaginal bleeding occurs.      •What problems you notice.        •Keep all follow up visits as told by your health care provider. This is important.        Contact a health care provider if you:    •Feel light-headed or weak.      •Have nausea and vomiting.      •Cannot eat or drink without vomiting.      •Feel dizzy or have diarrhea while you are taking medicines.      •Are taking birth control pills or hormones, and you want to change them or stop taking them.        Get help right away if:    •You develop a fever or chills.      •You need to change your sanitary pad or tampon more than one time per hour.      •Your vaginal bleeding becomes heavier, or your flow contains clots more often.      •You develop pain in your abdomen.      •You lose consciousness.      •You develop a rash.        Summary    •Dysfunctional uterine bleeding is abnormal bleeding from the uterus.      •It includes menstrual bleeding of abnormal duration, volume, or regularity.      •Bleeding after sex and after menopause are also considered dysfunctional uterine bleeding.      This information is not intended to replace advice given to you by your health care provider. Make sure you discuss any questions you have with your health care provider.      Document Revised: 05/29/2019 Document Reviewed: 05/29/2019    [employer] Patient Education © 2022 [employer]

## 2022-06-24 NOTE — ED PROVIDER NOTE - PATIENT PORTAL LINK FT
You can access the FollowMyHealth Patient Portal offered by [facility] by registering at the following website: [email or web address]. By joining [employer]’s FollowMyHealth portal, you will also be able to view your health information using other applications (apps) compatible with our system.

## 2022-06-24 NOTE — ED ADULT TRIAGE NOTE - WEIGHT METHOD
I have personally provided the amount of critical care time documented below concurrently with the resident/fellow.  This time excludes time spent on separate procedures and time spent teaching. I have reviewed the resident’s/fellow’s documentation and I agree with the assessment and plan of care stated

## 2022-06-24 NOTE — ED ADULT NURSE NOTE - OBJECTIVE STATEMENT
Patient c/o of vaginal bleeding x5 days. Patient reports saturating 6 pads a day. Patient denies SOB, headache, dizziness, chest pain, and blurry vision.

## 2022-06-24 NOTE — ED PROVIDER NOTE - CLINICAL SUMMARY MEDICAL DECISION MAKING FREE TEXT BOX
51 yr old female with hx of Depression, anxiety, HTN, polycystic kidney ds, spleen cyst, left ovarian hemorrhagic cyst presents today c/o vaginal bleeding x 2 days. Pt reports last LMP - May 7th, when recent period was due, pt reports brown spotting for 2 weeks. Pt reports 5 days ago, vaginal bleeding increased, worsening in the last 2 days. Pt reports regular periods. Denies chest pain, sob, dizziness, lightheaded. Pt reports soaking 6 pads daily for the last 2 days. last OBGYN checkup in 2020. Pt reports she had appointment this monday due to concerns of not getting her period. Pt was in the waiting room and her period started so she left prior to her appointment. 51 yr old female with hx of Depression, anxiety, HTN, polycystic kidney ds, spleen cyst, left ovarian hemorrhagic cyst presents today c/o vaginal bleeding x 2 days. Pt reports last LMP - May 7th, when recent period was due, pt reports brown spotting for 2 weeks. Pt reports 5 days ago, vaginal bleeding increased, worsening in the last 2 days. Pt reports regular periods. Denies chest pain, sob, dizziness, lightheaded. Pt reports soaking 6 pads daily for the last 2 days. last OBGYN checkup in 2020. Pt reports she had appointment this monday due to concerns of not getting her period. Pt was in the waiting room and her period started so she left prior to her appointment.   well appearing, clear lungs, abdomen soft non tender   labs ordered to check h/h   pt requesting US, which was ordered, awaiting US 51 yr old female with hx of Depression, anxiety, HTN, polycystic kidney ds, spleen cyst, left ovarian hemorrhagic cyst presents today c/o vaginal bleeding x 2 days. Pt reports last LMP - May 7th, when recent period was due, pt reports brown spotting for 2 weeks. Pt reports 5 days ago, vaginal bleeding increased, worsening in the last 2 days. Pt reports regular periods. Denies chest pain, sob, dizziness, lightheaded. Pt reports soaking 6 pads daily for the last 2 days. last OBGYN checkup in 2020. Pt reports she had appointment this monday due to concerns of not getting her period. Pt was in the waiting room and her period started so she left prior to her appointment.   well appearing, clear lungs, abdomen soft non tender   labs ordered to check h/h   pt requesting US, which was ordered, awaiting US   2045: labs and US reviewed.  Pt no longer bleeding heavy in ED.    pt stable for dc and to follow up with OBGYN as scheduled for next week

## 2022-06-24 NOTE — ED ADULT NURSE NOTE - NSIMPLEMENTINTERV_GEN_ALL_ED
Implemented All Universal Safety Interventions:  [place] to call system. Call bell, personal items and telephone within reach. Instruct patient to call for assistance. Room bathroom lighting operational. Non-slip footwear when patient is off stretcher. Physically safe environment: no spills, clutter or unnecessary equipment. Stretcher in lowest position, wheels locked, appropriate side rails in place. Complex Repair And Double Advancement Flap Text: The defect edges were debeveled with a #15 scalpel blade.  The primary defect was closed partially with a complex linear closure.  Given the location of the remaining defect, shape of the defect and the proximity to free margins a double advancement flap was deemed most appropriate for complete closure of the defect.  Using a sterile surgical marker, an appropriate advancement flap was drawn incorporating the defect and placing the expected incisions within the relaxed skin tension lines where possible.    The area thus outlined was incised deep to adipose tissue with a #15 scalpel blade.  The skin margins were undermined to an appropriate distance in all directions utilizing iris scissors.

## 2022-06-24 NOTE — ED PROVIDER NOTE - ATTENDING APP SHARED VISIT CONTRIBUTION OF CARE
I have personally performed a face to face medical and diagnostic evaluation of the patient. I have discussed with and reviewed the JULIO C's note and agree with the History, ROS, Physical Exam and MDM unless otherwise indicated. A brief summary of my personal evaluation and impression can be found below.    51F hx of depression, anxiety, HTN polycystic kidney recent dx of L hemorraghic cyst presents w a cc of heavy vaginal bleeding x2 days LMP may 7th also notes brown spotting, now w worsening bleeding and passage of large clots. Notes feeling a little tired otherwise has no complaints. No other vaginal discharge no pain no urinary complaints. No fever.     All other ROS negative, except as above and as per HPI and ROS section.    VITALS: Initial triage and subsequent vitals have been reviewed by me.  GEN APPEARANCE: WDWN, alert, non-toxic, NAD  HEAD: Atraumatic.  EYES: PERRLa, EOMI, vision grossly intact.   NECK: Supple  CV: RRR, S1S2, no c/r/m/g. Cap refill <2 seconds. No bruits.   LUNGS: CTAB. No abnormal breath sounds.  ABDOMEN: Soft, NTND. No guarding or rebound.   MSK/EXT: No spinal or extremity point tenderness. No CVA ttp. Pelvis stable. No peripheral edema.  NEURO: Alert, follows commands. Weight bearing normal. Speech normal. Sensation and motor normal x4 extremities.   SKIN: Warm, dry and intact. No rash.  PSYCH: Appropriate    Plan/MDM: 51F hx of depression, anxiety, HTN polycystic kidney recent dx of L hemorraghic cyst presents w a cc of heavy vaginal bleeding x2 days LMP may 7th also notes brown spotting, now w worsening bleeding and passage of large clots. Notes feeling a little tired otherwise has no complaints. No other vaginal discharge no pain no urinary complaints. No fever.  exam vss non toxic PE as above ddx c/f likely DUB will check labs tvus meds as needed and reassess.

## 2022-06-24 NOTE — ED ADULT TRIAGE NOTE - CHIEF COMPLAINT QUOTE
BIB EMS from home for c/o heavy vaginal bleeding x 5 days with multiple blood clots. Denies taking any blood thinners. LMP 5/3/22. BIB EMS from home for c/o heavy vaginal bleeding x 5 days with multiple blood clots. Denies taking any blood thinners. LMP 5/3/22. Pt with recent dx of hemorraghic cyst on ovary.

## 2022-06-24 NOTE — ED PROVIDER NOTE - NS ED ATTENDING STATEMENT MOD
As discussed:    1. Continue fiber, probiotics  2. Check labs for completeness  3. Low FODMAPs with fruits and sweeteners may help  ([place])  4. Colonoscopy with bx if needed       This was a shared visit with the JULIO C. I reviewed and verified the documentation and independently performed the documented: 6

## 2022-06-24 NOTE — ED PROVIDER NOTE - OBJECTIVE STATEMENT
51 yr old female with hx of Depression, anxiety, HTN, polycystic kidney ds, spleen cyst, left ovarian hemorrhagic cyst presents today c/o vaginal bleeding x 2 days. Pt reports last LMP - May 7th, when recent period was due, pt reports brown spotting for 2 weeks. Pt reports 5 days ago, vaginal bleeding increased, worsening in the last 2 days with passing of clots. Pt reports regular periods. Denies chest pain, sob, dizziness, lightheaded. Pt reports soaking 6 pads daily for the last 2 days. last OBGYN checkup in 2020. Pt reports she had appointment this monday due to concerns of not getting her period. Pt was in the waiting room and her period started so she left prior to her appointment.

## 2022-06-30 NOTE — ED ADULT NURSE NOTE - DATE OF FIRST COVID-19 BOOSTER
Pt's grandmother called back to give the pharmacy information is [facility] [id number], phone number [phone]  01-Feb-2022

## 2022-07-08 ENCOUNTER — EMERGENCY (EMERGENCY)
Facility: HOSPITAL | Age: 52
LOS: 1 days | Discharge: HOME | End: 2022-07-08
Attending: INTERNAL MEDICINE | Admitting: INTERNAL MEDICINE
Payer: COMMERCIAL

## 2022-07-08 VITALS
HEIGHT: 71 IN | OXYGEN SATURATION: 100 % | SYSTOLIC BLOOD PRESSURE: 171 MMHG | DIASTOLIC BLOOD PRESSURE: 81 MMHG | HEART RATE: 81 BPM | RESPIRATION RATE: 18 BRPM | TEMPERATURE: 98 F

## 2022-07-08 VITALS
HEART RATE: 72 BPM | TEMPERATURE: 99 F | SYSTOLIC BLOOD PRESSURE: 139 MMHG | RESPIRATION RATE: 18 BRPM | OXYGEN SATURATION: 99 % | DIASTOLIC BLOOD PRESSURE: 55 MMHG

## 2022-07-08 LAB
ALBUMIN SERPL ELPH-MCNC: 3.5 G/DL — SIGNIFICANT CHANGE UP (ref 3.3–5)
ALP SERPL-CCNC: 97 U/L — SIGNIFICANT CHANGE UP (ref 40–120)
ALT FLD-CCNC: 16 U/L — SIGNIFICANT CHANGE UP (ref 10–45)
ANION GAP SERPL CALC-SCNC: 8 MMOL/L — SIGNIFICANT CHANGE UP (ref 5–17)
AST SERPL-CCNC: 12 U/L — SIGNIFICANT CHANGE UP (ref 10–40)
BASOPHILS # BLD AUTO: 0.04 K/UL — SIGNIFICANT CHANGE UP (ref 0–0.2)
BASOPHILS NFR BLD AUTO: 0.4 % — SIGNIFICANT CHANGE UP (ref 0–2)
BILIRUB SERPL-MCNC: 0.4 MG/DL — SIGNIFICANT CHANGE UP (ref 0.2–1.2)
BUN SERPL-MCNC: 13 MG/DL — SIGNIFICANT CHANGE UP (ref 7–23)
CALCIUM SERPL-MCNC: 8.9 MG/DL — SIGNIFICANT CHANGE UP (ref 8.4–10.5)
CHLORIDE SERPL-SCNC: 105 MMOL/L — SIGNIFICANT CHANGE UP (ref 96–108)
CO2 SERPL-SCNC: 29 MMOL/L — SIGNIFICANT CHANGE UP (ref 22–31)
CREAT SERPL-MCNC: 0.74 MG/DL — SIGNIFICANT CHANGE UP (ref 0.5–1.3)
EGFR: 99 ML/MIN/1.73M2 — SIGNIFICANT CHANGE UP
EOSINOPHIL # BLD AUTO: 0.06 K/UL — SIGNIFICANT CHANGE UP (ref 0–0.5)
EOSINOPHIL NFR BLD AUTO: 0.6 % — SIGNIFICANT CHANGE UP (ref 0–6)
GLUCOSE SERPL-MCNC: 105 MG/DL — HIGH (ref 70–99)
HCT VFR BLD CALC: 37.7 % — SIGNIFICANT CHANGE UP (ref 34.5–45)
HGB BLD-MCNC: 12.1 G/DL — SIGNIFICANT CHANGE UP (ref 11.5–15.5)
IMM GRANULOCYTES NFR BLD AUTO: 0.4 % — SIGNIFICANT CHANGE UP (ref 0–1.5)
INR BLD: 1.2 RATIO — HIGH (ref 0.88–1.16)
LIDOCAIN IGE QN: 86 U/L — SIGNIFICANT CHANGE UP (ref 73–393)
LYMPHOCYTES # BLD AUTO: 1.45 K/UL — SIGNIFICANT CHANGE UP (ref 1–3.3)
LYMPHOCYTES # BLD AUTO: 15.3 % — SIGNIFICANT CHANGE UP (ref 13–44)
MCHC RBC-ENTMCNC: 25.5 PG — LOW (ref 27–34)
MCHC RBC-ENTMCNC: 32.1 GM/DL — SIGNIFICANT CHANGE UP (ref 32–36)
MCV RBC AUTO: 79.5 FL — LOW (ref 80–100)
MONOCYTES # BLD AUTO: 0.48 K/UL — SIGNIFICANT CHANGE UP (ref 0–0.9)
MONOCYTES NFR BLD AUTO: 5.1 % — SIGNIFICANT CHANGE UP (ref 2–14)
NEUTROPHILS # BLD AUTO: 7.43 K/UL — HIGH (ref 1.8–7.4)
NEUTROPHILS NFR BLD AUTO: 78.2 % — HIGH (ref 43–77)
NRBC # BLD: 0 /100 WBCS — SIGNIFICANT CHANGE UP (ref 0–0)
PLATELET # BLD AUTO: 205 K/UL — SIGNIFICANT CHANGE UP (ref 150–400)
POTASSIUM SERPL-MCNC: 3.8 MMOL/L — SIGNIFICANT CHANGE UP (ref 3.5–5.3)
POTASSIUM SERPL-SCNC: 3.8 MMOL/L — SIGNIFICANT CHANGE UP (ref 3.5–5.3)
PROT SERPL-MCNC: 7.5 G/DL — SIGNIFICANT CHANGE UP (ref 6–8.3)
PROTHROM AB SERPL-ACNC: 13.9 SEC — HIGH (ref 10.5–13.4)
RBC # BLD: 4.74 M/UL — SIGNIFICANT CHANGE UP (ref 3.8–5.2)
RBC # FLD: 15.5 % — HIGH (ref 10.3–14.5)
SODIUM SERPL-SCNC: 142 MMOL/L — SIGNIFICANT CHANGE UP (ref 135–145)
WBC # BLD: 9.5 K/UL — SIGNIFICANT CHANGE UP (ref 3.8–10.5)
WBC # FLD AUTO: 9.5 K/UL — SIGNIFICANT CHANGE UP (ref 3.8–10.5)

## 2022-07-08 PROCEDURE — 96361 HYDRATE IV INFUSION ADD-ON: CPT

## 2022-07-08 PROCEDURE — 96365 THER/PROPH/DIAG IV INF INIT: CPT

## 2022-07-08 PROCEDURE — 85025 COMPLETE CBC W/AUTO DIFF WBC: CPT

## 2022-07-08 PROCEDURE — 83690 ASSAY OF LIPASE: CPT

## 2022-07-08 PROCEDURE — 99284 EMERGENCY DEPT VISIT MOD MDM: CPT

## 2022-07-08 PROCEDURE — 80053 COMPREHEN METABOLIC PANEL: CPT

## 2022-07-08 PROCEDURE — 85610 PROTHROMBIN TIME: CPT

## 2022-07-08 PROCEDURE — 99284 EMERGENCY DEPT VISIT MOD MDM: CPT | Mod: 25

## 2022-07-08 PROCEDURE — 36415 COLL VENOUS BLD VENIPUNCTURE: CPT

## 2022-07-08 RX ORDER — TRANEXAMIC ACID 100 MG/ML
2 INJECTION, SOLUTION INTRAVENOUS
Qty: 30 | Refills: 0
Start: 2022-07-08 | End: 2022-07-12

## 2022-07-08 RX ORDER — SODIUM CHLORIDE 9 MG/ML
1000 INJECTION INTRAMUSCULAR; INTRAVENOUS; SUBCUTANEOUS ONCE
Refills: 0 | Status: COMPLETED | OUTPATIENT
Start: 2022-07-08 | End: 2022-07-08

## 2022-07-08 RX ORDER — HYDROXYZINE HCL 10 MG
25 TABLET ORAL ONCE
Refills: 0 | Status: COMPLETED | OUTPATIENT
Start: 2022-07-08 | End: 2022-07-08

## 2022-07-08 RX ORDER — TRANEXAMIC ACID 100 MG/ML
1000 INJECTION, SOLUTION INTRAVENOUS ONCE
Refills: 0 | Status: COMPLETED | OUTPATIENT
Start: 2022-07-08 | End: 2022-07-08

## 2022-07-08 RX ADMIN — Medication 25 MILLIGRAM(S): at 14:27

## 2022-07-08 RX ADMIN — TRANEXAMIC ACID 220 MILLIGRAM(S): 100 INJECTION, SOLUTION INTRAVENOUS at 12:51

## 2022-07-08 RX ADMIN — SODIUM CHLORIDE 1000 MILLILITER(S): 9 INJECTION INTRAMUSCULAR; INTRAVENOUS; SUBCUTANEOUS at 10:19

## 2022-07-08 RX ADMIN — SODIUM CHLORIDE 1000 MILLILITER(S): 9 INJECTION INTRAMUSCULAR; INTRAVENOUS; SUBCUTANEOUS at 11:33

## 2022-07-08 RX ADMIN — TRANEXAMIC ACID 1000 MILLIGRAM(S): 100 INJECTION, SOLUTION INTRAVENOUS at 13:21

## 2022-07-08 NOTE — ED ADULT NURSE REASSESSMENT NOTE - NS ED NURSE REASSESS COMMENT FT1
Pt is "still thinking about taking medication", she has been given literature on med, MD spoke to her several times.  Pt refusing med at [facility] but refusing to leave either.

## 2022-07-08 NOTE — ED PROVIDER NOTE - NSFOLLOWUPINSTRUCTIONS_ED_ALL_ED_FT
Follow up with your schedule appt with Dr. Lyons 7/12  Take the TXA as prescribed  Any worsening of symptoms or new concerning symptoms return to the ED Follow up with your schedule appt with Dr. Lyosn 7/12  Take the TXA as prescribed  Any worsening of symptoms or new concerning symptoms return to the ED  appoint 7/12/22 2 pm

## 2022-07-08 NOTE — ED PROVIDER NOTE - CARE PROVIDER_API CALL
Jose Lyons)  Obstetrics and Gynecology  [address], [address]  [place] [address]  Phone: [phone]  Fax: [phone]  Follow Up Time:

## 2022-07-08 NOTE — ED PROVIDER NOTE - PATIENT PORTAL LINK FT
You can access the FollowMyHealth Patient Portal offered by [facility] by registering at the following website: [email or web address]. By joining [facility] [employer]’s FollowMyHealth portal, you will also be able to view your health information using other applications (apps) compatible with our system.

## 2022-07-08 NOTE — ED PROVIDER NOTE - NS ED MD DISPO DISCHARGE CCDA
Changed the location and verified pts, also faxed the copy of the aut to the new facility.    Patient/Caregiver provided printed discharge information.

## 2022-07-08 NOTE — ED ADULT NURSE REASSESSMENT NOTE - NS ED NURSE REASSESS COMMENT FT1
Patient doesn't want to take medication ( Tranexamic) at this time, Dr. Greer made aware. Risks and benefits explained. Patient verbalizes instructions and understanding. Safety maintained.

## 2022-07-08 NOTE — ED PROVIDER NOTE - OBJECTIVE STATEMENT
51 yr old female with hx of Depression, anxiety, HTN, polycystic kidney ds, spleen cyst, left ovarian hemorrhagic cyst presents today c/o vaginal bleeding x 2 days. Pt was seen in ED 2 weeks ago for same complaint. h/h normal and US shows endometrium 20. Pt reports soaking 6 pads daily for the last 2 days.   Denies chest pain, sob, dizziness, lightheaded. last OBGYN checkup in 2020.

## 2022-07-08 NOTE — ED PROVIDER NOTE - ATTENDING APP SHARED VISIT CONTRIBUTION OF CARE
51 yr old female with hx of Depression, anxiety, HTN, polycystic kidney ds, spleen cyst, left ovarian hemorrhagic cyst presents today c/o vaginal bleeding x 2 days. Pt was seen in ED 2 weeks ago for same complaint. h/h normal and US shows endometrium 20. Pt reports soaking 6 pads daily for the last 2 days.   Denies chest pain, sob, dizziness, lightheaded. last OBGYN checkup in 2020.  only oozing blood vaginal wo clots in ED  discussed with dr. plummer. txa in ED and to go home  appt 7/12 with dr. plummer  Discussed with patient need to return to ED if symptoms don't continue to improve or recur or develops any new or worsening symptoms that are of concern.  Dr. Greer:  I have reviewed and discussed with the PA/ resident the case specifics, including the history, physical assessment, evaluation, conclusion, laboratory results, and medical plan. I agree with the contents, and conclusions. I have personally examined, and interviewed the patient.

## 2022-07-08 NOTE — ED PROVIDER NOTE - CLINICAL SUMMARY MEDICAL DECISION MAKING FREE TEXT BOX
51 yr old female with hx of Depression, anxiety, HTN, polycystic kidney ds, spleen cyst, left ovarian hemorrhagic cyst presents today c/o vaginal bleeding x 2 days. Pt was seen in ED 2 weeks ago for same complaint. h/h normal and US shows endometrium 20. Pt reports soaking 6 pads daily for the last 2 days.   Denies chest pain, sob, dizziness, lightheaded. last OBGYN checkup in 2020. 51 yr old female with hx of Depression, anxiety, HTN, polycystic kidney ds, spleen cyst, left ovarian hemorrhagic cyst presents today c/o vaginal bleeding x 2 days. Pt was seen in ED 2 weeks ago for same complaint. h/h normal and US shows endometrium 20. Pt reports soaking 6 pads daily for the last 2 days.   Denies chest pain, sob, dizziness, lightheaded. last OBGYN checkup in 2020.  only oozing blood vaginal wo clots in ED  discussed with dr. plummer. txa in ED and to go home  appt 7/12 with dr. plummer  Discussed with patient need to return to ED if symptoms don't continue to improve or recur or develops any new or worsening symptoms that are of concern.

## 2022-07-08 NOTE — ED ADULT TRIAGE NOTE - PRO INTERPRETER NEED 2
A1c 15.5%  - will adjust insulin dosing  c/w JOCELYNE and monitor FS ac and hs   hold home metformin and dapagliflozin English c/w asa and atorvastatin

## 2022-07-09 ENCOUNTER — EMERGENCY (EMERGENCY)
Facility: HOSPITAL | Age: 52
LOS: 1 days | Discharge: HOME | End: 2022-07-09
Attending: STUDENT IN AN ORGANIZED HEALTH CARE EDUCATION/TRAINING PROGRAM
Payer: COMMERCIAL

## 2022-07-09 ENCOUNTER — EMERGENCY (EMERGENCY)
Facility: HOSPITAL | Age: 52
LOS: 1 days | Discharge: HOME | End: 2022-07-09
Attending: EMERGENCY MEDICINE | Admitting: EMERGENCY MEDICINE
Payer: COMMERCIAL

## 2022-07-09 VITALS
DIASTOLIC BLOOD PRESSURE: 80 MMHG | HEIGHT: 71 IN | TEMPERATURE: 100 F | SYSTOLIC BLOOD PRESSURE: 170 MMHG | WEIGHT: 235.01 LBS | RESPIRATION RATE: 21 BRPM | OXYGEN SATURATION: 99 % | HEART RATE: 80 BPM

## 2022-07-09 VITALS
WEIGHT: 235.01 LBS | RESPIRATION RATE: 18 BRPM | TEMPERATURE: 98 F | OXYGEN SATURATION: 98 % | HEIGHT: 71 IN | SYSTOLIC BLOOD PRESSURE: 176 MMHG | HEART RATE: 80 BPM | DIASTOLIC BLOOD PRESSURE: 97 MMHG

## 2022-07-09 VITALS
DIASTOLIC BLOOD PRESSURE: 86 MMHG | TEMPERATURE: 98 F | RESPIRATION RATE: 18 BRPM | SYSTOLIC BLOOD PRESSURE: 150 MMHG | HEART RATE: 72 BPM | OXYGEN SATURATION: 99 %

## 2022-07-09 VITALS
SYSTOLIC BLOOD PRESSURE: 143 MMHG | TEMPERATURE: 98 F | DIASTOLIC BLOOD PRESSURE: 85 MMHG | RESPIRATION RATE: 18 BRPM | OXYGEN SATURATION: 98 % | HEART RATE: 73 BPM

## 2022-07-09 LAB
ALBUMIN SERPL ELPH-MCNC: 3.4 G/DL — SIGNIFICANT CHANGE UP (ref 3.3–5)
ALBUMIN SERPL ELPH-MCNC: 4.5 G/DL — SIGNIFICANT CHANGE UP (ref 3.3–5)
ALP SERPL-CCNC: 88 U/L — SIGNIFICANT CHANGE UP (ref 40–120)
ALP SERPL-CCNC: 90 U/L — SIGNIFICANT CHANGE UP (ref 40–120)
ALT FLD-CCNC: 11 U/L — SIGNIFICANT CHANGE UP (ref 10–45)
ALT FLD-CCNC: 14 U/L — SIGNIFICANT CHANGE UP (ref 10–45)
ANION GAP SERPL CALC-SCNC: 12 MMOL/L — SIGNIFICANT CHANGE UP (ref 5–17)
ANION GAP SERPL CALC-SCNC: 8 MMOL/L — SIGNIFICANT CHANGE UP (ref 5–17)
APTT BLD: 21 SEC — LOW (ref 27.5–35.5)
APTT BLD: 31.8 SEC — SIGNIFICANT CHANGE UP (ref 27.5–35.5)
AST SERPL-CCNC: 14 U/L — SIGNIFICANT CHANGE UP (ref 10–40)
AST SERPL-CCNC: 28 U/L — SIGNIFICANT CHANGE UP (ref 10–40)
BASOPHILS # BLD AUTO: 0.04 K/UL — SIGNIFICANT CHANGE UP (ref 0–0.2)
BASOPHILS NFR BLD AUTO: 0.4 % — SIGNIFICANT CHANGE UP (ref 0–2)
BILIRUB SERPL-MCNC: 0.3 MG/DL — SIGNIFICANT CHANGE UP (ref 0.2–1.2)
BILIRUB SERPL-MCNC: 0.4 MG/DL — SIGNIFICANT CHANGE UP (ref 0.2–1.2)
BLD GP AB SCN SERPL QL: NEGATIVE — SIGNIFICANT CHANGE UP
BUN SERPL-MCNC: 12 MG/DL — SIGNIFICANT CHANGE UP (ref 7–23)
BUN SERPL-MCNC: 13 MG/DL — SIGNIFICANT CHANGE UP (ref 7–23)
CALCIUM SERPL-MCNC: 8.4 MG/DL — SIGNIFICANT CHANGE UP (ref 8.4–10.5)
CALCIUM SERPL-MCNC: 9.6 MG/DL — SIGNIFICANT CHANGE UP (ref 8.4–10.5)
CHLORIDE SERPL-SCNC: 104 MMOL/L — SIGNIFICANT CHANGE UP (ref 96–108)
CHLORIDE SERPL-SCNC: 105 MMOL/L — SIGNIFICANT CHANGE UP (ref 96–108)
CO2 SERPL-SCNC: 25 MMOL/L — SIGNIFICANT CHANGE UP (ref 22–31)
CO2 SERPL-SCNC: 27 MMOL/L — SIGNIFICANT CHANGE UP (ref 22–31)
CREAT SERPL-MCNC: 0.69 MG/DL — SIGNIFICANT CHANGE UP (ref 0.5–1.3)
CREAT SERPL-MCNC: 0.72 MG/DL — SIGNIFICANT CHANGE UP (ref 0.5–1.3)
EGFR: 103 ML/MIN/1.73M2 — SIGNIFICANT CHANGE UP
EGFR: 106 ML/MIN/1.73M2 — SIGNIFICANT CHANGE UP
EOSINOPHIL # BLD AUTO: 0.04 K/UL — SIGNIFICANT CHANGE UP (ref 0–0.5)
EOSINOPHIL NFR BLD AUTO: 0.4 % — SIGNIFICANT CHANGE UP (ref 0–6)
GLUCOSE SERPL-MCNC: 102 MG/DL — HIGH (ref 70–99)
GLUCOSE SERPL-MCNC: 106 MG/DL — HIGH (ref 70–99)
HCG SERPL-ACNC: <2 MIU/ML — SIGNIFICANT CHANGE UP
HCT VFR BLD CALC: 36.5 % — SIGNIFICANT CHANGE UP (ref 34.5–45)
HCT VFR BLD CALC: 37.9 % — SIGNIFICANT CHANGE UP (ref 34.5–45)
HGB BLD-MCNC: 11.5 G/DL — SIGNIFICANT CHANGE UP (ref 11.5–15.5)
HGB BLD-MCNC: 11.5 G/DL — SIGNIFICANT CHANGE UP (ref 11.5–15.5)
IMM GRANULOCYTES NFR BLD AUTO: 0.5 % — SIGNIFICANT CHANGE UP (ref 0–1.5)
INR BLD: 1.16 RATIO — SIGNIFICANT CHANGE UP (ref 0.88–1.16)
INR BLD: 1.17 RATIO — HIGH (ref 0.88–1.16)
LYMPHOCYTES # BLD AUTO: 1.36 K/UL — SIGNIFICANT CHANGE UP (ref 1–3.3)
LYMPHOCYTES # BLD AUTO: 13.4 % — SIGNIFICANT CHANGE UP (ref 13–44)
MAGNESIUM SERPL-MCNC: 2 MG/DL — SIGNIFICANT CHANGE UP (ref 1.6–2.6)
MCHC RBC-ENTMCNC: 24.4 PG — LOW (ref 27–34)
MCHC RBC-ENTMCNC: 25.1 PG — LOW (ref 27–34)
MCHC RBC-ENTMCNC: 30.3 GM/DL — LOW (ref 32–36)
MCHC RBC-ENTMCNC: 31.5 GM/DL — LOW (ref 32–36)
MCV RBC AUTO: 79.5 FL — LOW (ref 80–100)
MCV RBC AUTO: 80.3 FL — SIGNIFICANT CHANGE UP (ref 80–100)
MONOCYTES # BLD AUTO: 0.39 K/UL — SIGNIFICANT CHANGE UP (ref 0–0.9)
MONOCYTES NFR BLD AUTO: 3.8 % — SIGNIFICANT CHANGE UP (ref 2–14)
NEUTROPHILS # BLD AUTO: 8.3 K/UL — HIGH (ref 1.8–7.4)
NEUTROPHILS NFR BLD AUTO: 81.5 % — HIGH (ref 43–77)
NRBC # BLD: 0 /100 WBCS — SIGNIFICANT CHANGE UP (ref 0–0)
NRBC # BLD: 0 /100 WBCS — SIGNIFICANT CHANGE UP (ref 0–0)
PLATELET # BLD AUTO: 213 K/UL — SIGNIFICANT CHANGE UP (ref 150–400)
PLATELET # BLD AUTO: 232 K/UL — SIGNIFICANT CHANGE UP (ref 150–400)
POTASSIUM SERPL-MCNC: 3.8 MMOL/L — SIGNIFICANT CHANGE UP (ref 3.5–5.3)
POTASSIUM SERPL-MCNC: 4 MMOL/L — SIGNIFICANT CHANGE UP (ref 3.5–5.3)
POTASSIUM SERPL-SCNC: 3.8 MMOL/L — SIGNIFICANT CHANGE UP (ref 3.5–5.3)
POTASSIUM SERPL-SCNC: 4 MMOL/L — SIGNIFICANT CHANGE UP (ref 3.5–5.3)
PROT SERPL-MCNC: 7.2 G/DL — SIGNIFICANT CHANGE UP (ref 6–8.3)
PROT SERPL-MCNC: 7.6 G/DL — SIGNIFICANT CHANGE UP (ref 6–8.3)
PROTHROM AB SERPL-ACNC: 13.5 SEC — HIGH (ref 10.5–13.4)
PROTHROM AB SERPL-ACNC: 13.6 SEC — HIGH (ref 10.5–13.4)
RBC # BLD: 4.59 M/UL — SIGNIFICANT CHANGE UP (ref 3.8–5.2)
RBC # BLD: 4.72 M/UL — SIGNIFICANT CHANGE UP (ref 3.8–5.2)
RBC # FLD: 15.4 % — HIGH (ref 10.3–14.5)
RBC # FLD: 15.5 % — HIGH (ref 10.3–14.5)
RH IG SCN BLD-IMP: POSITIVE — SIGNIFICANT CHANGE UP
SARS-COV-2 RNA SPEC QL NAA+PROBE: SIGNIFICANT CHANGE UP
SODIUM SERPL-SCNC: 140 MMOL/L — SIGNIFICANT CHANGE UP (ref 135–145)
SODIUM SERPL-SCNC: 141 MMOL/L — SIGNIFICANT CHANGE UP (ref 135–145)
WBC # BLD: 10.18 K/UL — SIGNIFICANT CHANGE UP (ref 3.8–10.5)
WBC # BLD: 10.69 K/UL — HIGH (ref 3.8–10.5)
WBC # FLD AUTO: 10.18 K/UL — SIGNIFICANT CHANGE UP (ref 3.8–10.5)
WBC # FLD AUTO: 10.69 K/UL — HIGH (ref 3.8–10.5)

## 2022-07-09 PROCEDURE — 99284 EMERGENCY DEPT VISIT MOD MDM: CPT

## 2022-07-09 PROCEDURE — 86901 BLOOD TYPING SEROLOGIC RH(D): CPT

## 2022-07-09 PROCEDURE — 84702 CHORIONIC GONADOTROPIN TEST: CPT

## 2022-07-09 PROCEDURE — 99283 EMERGENCY DEPT VISIT LOW MDM: CPT

## 2022-07-09 PROCEDURE — 85730 THROMBOPLASTIN TIME PARTIAL: CPT

## 2022-07-09 PROCEDURE — U0003: CPT

## 2022-07-09 PROCEDURE — 80053 COMPREHEN METABOLIC PANEL: CPT

## 2022-07-09 PROCEDURE — 86900 BLOOD TYPING SEROLOGIC ABO: CPT

## 2022-07-09 PROCEDURE — 85610 PROTHROMBIN TIME: CPT

## 2022-07-09 PROCEDURE — 83735 ASSAY OF MAGNESIUM: CPT

## 2022-07-09 PROCEDURE — 85025 COMPLETE CBC W/AUTO DIFF WBC: CPT

## 2022-07-09 PROCEDURE — U0005: CPT

## 2022-07-09 PROCEDURE — 86850 RBC ANTIBODY SCREEN: CPT

## 2022-07-09 NOTE — ED PROVIDER NOTE - CARE PROVIDER_API CALL
Jose Lyons)  Obstetrics and Gynecology  [address], [address]  [place] [address]  Phone: [phone]  Fax: [phone]  Scheduled Appointment: 07/12/2022

## 2022-07-09 NOTE — ED PROVIDER NOTE - PROGRESS NOTE DETAILS
pt seen by obgyn awaiting final recs. pt w/ soft abdomen no guarding no rebound bleeding has stopped Jhonathan Baum MD PGY-2  Labs stable, OB/GYN recs reviewed, recommend continuing with TXA as rx'd. H&H stable from this am. Most likely dysfunctional uterine bleeding ico chelsea-menopause. Return precautions reviewed.

## 2022-07-09 NOTE — ED PROVIDER NOTE - CARE PROVIDER_API CALL
Jose Lyons)  Obstetrics and Gynecology  [address], [address]  [place] [address]  Phone: [phone]  Fax: [phone]  Follow Up Time: 1-3 Days

## 2022-07-09 NOTE — CONSULT NOTE ADULT - SUBJECTIVE AND OBJECTIVE BOX
CHERELLE KOEHLER  51y  Female [id number]    HPI:        Name of GYN Physician:     POB:    Pgyn: Denies fibroids, cysts, endometriosis, STI's, Abnormal pap smears   PMH:   PAST MEDICAL & SURGICAL HISTORY:    PSH:  Meds:  All: Allergies    Augmentin (Flushing; Rash)    Intolerances      SH: Denies smoking use, drug use, alcohol use.   +occasional social alcohol use    Hospital Meds:   MEDICATIONS  (STANDING):    MEDICATIONS  (PRN):      FAMILY HISTORY:      Vital Signs Last 24 Hrs  T(C): 36.7 (09 Jul 2022 11:56), Max: 36.7 (09 Jul 2022 11:56)  T(F): 98 (09 Jul 2022 11:56), Max: 98 (09 Jul 2022 11:56)  HR: 84 (09 Jul 2022 12:46) (80 - 84)  BP: 152/72 (09 Jul 2022 12:46) (152/72 - 176/97)  BP(mean): --  RR: 18 (09 Jul 2022 12:46) (18 - 18)  SpO2: 97% (09 Jul 2022 12:46) (97% - 98%)    Parameters below as of 09 Jul 2022 12:46  Patient On (Oxygen Delivery Method): room air        Physical Exam:   General: sitting comfortably in bed, NAD   HEENT: neck supple, full ROM  CV: RR S1, S2 no m/r/g  Lungs: CTA b/l, good air flow b/l   Back: No CVA tenderness  Abd: Soft, non-tender, non-distended.  Bowel sounds present.    :  No bleeding on pad.  External labia wnl.  Bimanual exam with no cervical motion tenderness, uterus wnl, adnexa non palpable b/l.  Cervix closed vs. Cervix dilated    cm   Speculum Exam: No active bleeding from os.  Physiologic discharge.    Ext: non-tender b/l, no edema     LABS:                I&O's Detail          RADIOLOGY & ADDITIONAL STUDIES:        KELLY Hernandez  PGY-2 CHERELLE KOEHLER  51y  Female [id number]    HPI: 52yo  LMP 10 days ago coming in with heavy vaginal bleeding that started a few days ago. Patient was seen at [place] ED yesterday and early this morning around 2 AM for heavy vaginal bleeding. Patient reports "hemorrhaging" and saturating pads (similar to half of a [facility] katie) in an hour. She denies feeling lightheaded, dizzy, CP, SOB, N/V, abdominal pain. She reports feeling fatigued because she hasn't slept well for the past two nights because she is concerned about bleeding through her pad. Patient has an appointment with OBGYN Dr. Lyons on Tuesday. She was been told in the past that she has an endometrial polyp.    Earlier this morning in [facility] Hgb 12. She was given TXA there and discharged home with TXA (1300 q8hrs) which she has been taking. She still has a few doses left.    Patient has two half sisters who went through menopause in their late 40s. Patient does not know when her mother went through menopause. Patient used to have regular q28 day menses but since last October they have become heavier and irregular. She reports an episode last October when she bled for 10 days. She reports that this current episode's bleeding is heavier.    Name of GYN Physician: Dr. Lyons-first appointment this Tuesday    POB: 2016 spontaneous Ab  Pgyn: Denies fibroids, cysts, endometriosis, STI's, Abnormal pap smears     PAST MEDICAL & SURGICAL HISTORY:  Polycystic kidney disease  Mitral valve regurgitation  HTN  Anxiety    Meds: Amlodipine, Zoloft  All: Augmentin (Flushing; Rash)    SH: Denies smoking use, drug use, alcohol use.   +occasional social alcohol use    Hospital Meds:   MEDICATIONS  (STANDING):    MEDICATIONS  (PRN):      FAMILY HISTORY:      Vital Signs Last 24 Hrs  T(C): 36.7 (2022 11:56), Max: 36.7 (2022 11:56)  T(F): 98 (2022 11:56), Max: 98 (2022 11:56)  HR: 84 (2022 12:46) (80 - 84)  BP: 152/72 (2022 12:46) (152/72 - 176/97)  BP(mean): --  RR: 18 (2022 12:46) (18 - 18)  SpO2: 97% (2022 12:46) (97% - 98%)    Parameters below as of 2022 12:46  Patient On (Oxygen Delivery Method): room air        Physical Exam:   General: sitting comfortably in bed, NAD   HEENT: neck supple, full ROM  CV: RR S1, S2 no m/r/g  Lungs: CTA b/l, good air flow b/l                         11.5   10.18 )-----------( 232      ( 2022 14:21 )             37.9   Back: No CVA tenderness  Abd: Soft, non-tender, non-distended.  Bowel sounds present.    :  Light bleeding on pad that has been in place for the past hour.  External labia wnl.  Bimanual exam with no cervical motion tenderness, uterus wnl, adnexa non palpable b/l. Cervix dilated 0.5 cm   Speculum Exam: No active bleeding from os. Slight brown blood in vault  Ext: non-tender b/l, no edema     LABS:                          11.5   10.18 )-----------( 232      ( 2022 14:21 )             37.9         I&O's Detail      RADIOLOGY & ADDITIONAL STUDIES:         CHERELLE KOEHLER  51y  Female [id number]    HPI: 52yo  LMP 10 days ago coming in with heavy vaginal bleeding that started a few days ago. Patient was seen at [place] ED yesterday and early this morning around 2 AM for heavy vaginal bleeding. Patient reports "hemorrhaging" and saturating pads (similar to half of a [facility] katie) in an hour. She denies feeling lightheaded, dizzy, CP, SOB, N/V, abdominal pain. She reports feeling fatigued because she hasn't slept well for the past two nights because she is concerned about bleeding through her pad. Patient has an appointment with OBGYN Dr. Lyons on Tuesday. She was been told in the past that she has an endometrial polyp.    Earlier this morning in [facility] Hgb 12. She was given TXA there and discharged home with TXA (1300 q8hrs) which she has been taking. She still has a few doses left.    Patient has two half sisters who went through menopause in their late 40s. Patient does not know when her mother went through menopause. Patient used to have regular q28 day menses but since last October they have become heavier and irregular. She reports an episode last October when she bled for 10 days. She reports that this current episode's bleeding is heavier.    Name of GYN Physician: Dr. Lyons-first appointment this Tuesday    POB: 2016 spontaneous Ab  Pgyn: Denies fibroids, cysts, endometriosis, STI's, Abnormal pap smears     PAST MEDICAL & SURGICAL HISTORY:  Polycystic kidney disease  Mitral valve regurgitation  HTN  Anxiety    Meds: Amlodipine, Zoloft  All: Augmentin (Flushing; Rash)    SH: Denies smoking use, drug use, alcohol use.   +occasional social alcohol use    Hospital Meds:   MEDICATIONS  (STANDING):    MEDICATIONS  (PRN):      FAMILY HISTORY:      Vital Signs Last 24 Hrs  T(C): 36.7 (2022 11:56), Max: 36.7 (2022 11:56)  T(F): 98 (2022 11:56), Max: 98 (2022 11:56)  HR: 84 (2022 12:46) (80 - 84)  BP: 152/72 (2022 12:46) (152/72 - 176/97)  BP(mean): --  RR: 18 (2022 12:46) (18 - 18)  SpO2: 97% (2022 12:46) (97% - 98%)    Parameters below as of 2022 12:46  Patient On (Oxygen Delivery Method): room air        Physical Exam:   General: sitting comfortably in bed, NAD   HEENT: neck supple, full ROM  CV: RR S1, S2 no m/r/g  Lungs: CTA b/l, good air flow b/l                         11.5   10.18 )-----------( 232      ( 2022 14:21 )             37.9     Chaperone: Dr. Aquino  Back: No CVA tenderness  Abd: Soft, non-tender, non-distended.  Bowel sounds present.    :  Light bleeding on pad that has been in place for the past hour.  External labia wnl.  Bimanual exam with no cervical motion tenderness, uterus wnl, adnexa non palpable b/l. Cervix dilated 0.5 cm   Speculum Exam: No active bleeding from os. Slight brown blood in vault  Ext: non-tender b/l, no edema     LABS:                          11.5   10.18 )-----------( 232      ( 2022 14:21 )             37.9         I&O's Detail      RADIOLOGY & ADDITIONAL STUDIES:

## 2022-07-09 NOTE — ED PROVIDER NOTE - NSFOLLOWUPINSTRUCTIONS_ED_ALL_ED_FT
We evaluated you for vaginal bleeding today. We checked your hemoglobin and you do not need a blood transfusion at this time. Your situation can change quickly. If you develop bleeding that you are soaking through more than 2 pads per hour for 2 hours, if you develop lightheadedness/dizziness/feeling like you will pass out, chest pain, shortness of breath please return to the emergency room.     Please follow up with Dr. Lyons next tuesday. We evaluated you for vaginal bleeding today. We checked your hemoglobin and you do not need a blood transfusion at this time. Your situation can change quickly. If you develop bleeding that you are soaking through more than 2 pads per hour for 2 hours, if you develop lightheadedness/dizziness/feeling like you will pass out, chest pain, shortness of breath please return to the emergency room.     Please follow up with Dr. Lyons next Tuesday as scheduled.

## 2022-07-09 NOTE — ED ADULT NURSE NOTE - CHIEF COMPLAINT QUOTE
vag bleeding Sarahi 20 x 10 days stopped and started 7/7 , worsening bleeding has been seen at [facility] C/o tired   Abd cramping

## 2022-07-09 NOTE — ED ADULT TRIAGE NOTE - CHIEF COMPLAINT QUOTE
pt  was here  about  12 hours  ago and treated  for vaginal bleed  with txa was was d/c  and was to follow up with doctor plummer for  DNC   she  was [facility] now  with continued vaginal bleeding  she states  she is soaking one large pad  every  hour

## 2022-07-09 NOTE — ED PROVIDER NOTE - NSFOLLOWUPINSTRUCTIONS_ED_ALL_ED_FT
- continue TXA (tranexemic acid) as directed  - see GYN DR hill 7/12 as scheduled  - return for worse bleeding, pain, feeling faint, difficult breathing, chest pain or other concerns        Dysfunctional Uterine Bleeding      Dysfunctional uterine bleeding is abnormal bleeding from the uterus. Dysfunctional uterine bleeding includes:  •A menstrual period that comes earlier or later than usual.      •A menstrual period that is lighter or heavier than usual, or has large blood clots.      •Vaginal bleeding between menstrual periods.      •Skipping one or more menstrual periods.      •Vaginal bleeding after sex.      •Vaginal bleeding after menopause.        Follow these instructions at home:      Eating and drinking      •Eat well-balanced meals. Include foods that are high in iron, such as liver, meat, shellfish, green leafy vegetables, and eggs.    •To prevent or treat constipation, your health care provider may recommend that you:  •Drink enough fluid to keep your urine pale yellow.      •Take over-the-counter or prescription medicines.      •Eat foods that are high in fiber, such as beans, whole grains, and fresh fruits and vegetables.      •Limit foods that are high in fat and processed sugars, such as fried or sweet foods.        Medicines     •Take over-the-counter and prescription medicines only as told by your health care provider.      • Do not change medicines without talking with your health care provider.    •Aspirin or medicines that contain aspirin may make the bleeding worse. Do not take those medicines:  •During the week before your menstrual period.      •During your menstrual period.        •If you were prescribed iron pills, take them as told by your health care provider. Iron pills help to replace iron that your body loses because of this condition.      Activity   •If you need to change your sanitary pad or tampon more than one time every 2 hours:  •Lie in bed with your feet raised (elevated).      •Place a cold pack on your lower abdomen.      •Rest as much as possible until the bleeding stops or slows down.        • Do not try to lose weight until the bleeding has stopped and your blood iron level is back to normal.        General instructions    •For two months, write down:  •When your menstrual period starts.      •When your menstrual period ends.      •When any abnormal vaginal bleeding occurs.      •What problems you notice.        •Keep all follow up visits as told by your health care provider. This is important.        Contact a health care provider if you:    •Feel light-headed or weak.      •Have nausea and vomiting.      •Cannot eat or drink without vomiting.      •Feel dizzy or have diarrhea while you are taking medicines.      •Are taking birth control pills or hormones, and you want to change them or stop taking them.        Get help right away if:    •You develop a fever or chills.      •You need to change your sanitary pad or tampon more than one time per hour.      •Your vaginal bleeding becomes heavier, or your flow contains clots more often.      •You develop pain in your abdomen.      •You lose consciousness.      •You develop a rash.        Summary    •Dysfunctional uterine bleeding is abnormal bleeding from the uterus.      •It includes menstrual bleeding of abnormal duration, volume, or regularity.      •Bleeding after sex and after menopause are also considered dysfunctional uterine bleeding.      This information is not intended to replace advice given to you by your health care provider. Make sure you discuss any questions you have with your health care provider.

## 2022-07-09 NOTE — ED PROVIDER NOTE - PATIENT PORTAL LINK FT
You can access the FollowMyHealth Patient Portal offered by [facility] by registering at the following website: [email or web address]. By joining [employer]’s FollowMyHealth portal, you will also be able to view your health information using other applications (apps) compatible with our system.

## 2022-07-09 NOTE — ED ADULT NURSE NOTE - OBJECTIVE STATEMENT
50 yo F w/ PMHx of anxiety, depression, HTN, GERD presents to ED via waiting room c/o vaginal bleeding. Pt reports last month experienced 10 days of heavy vaginal bleeding, was seen at OSH at that time, dx w/ polyp and scheduled for f/u procedure. Pt has not yet gone for procedure, this week began experiencing return of vaginal bleeding. Was seen yesterday at [place] ED for same complaint. Pt endorses feeling anxious and weak d/t bleeding. Pt states bleeding is heavy w/ many clots. Pt denies any CP, SOB, cough, N/V, fever, chills, urinary complaints, constipation, diarrhea, HA, dizziness, weakness. Pt A&Ox4, lungs CTA, +central pulses. Abdomen soft, not tender, not distended. Ambulating w/ steady gait, safety and comfort maintained, no acute distress noted at this time. Pt denies any recent travel or known sick contacts.

## 2022-07-09 NOTE — ED PROVIDER NOTE - ATTENDING CONTRIBUTION TO CARE
51 F w/ alejandrina, htn, polycystic kidney disease here w/ vaginal bleeding, pt was seen at [facility] earlier today. took txa w/ improvement in bleeding however when she was discharged at 9 AM she got home at 10 she was having worsening sx. she reports she soaked a pad prior to coming to the ER. pt states that her symptoms are scary and brought her to the ER again, 3rd visit .pt w/ no cp, no sob, no lightheadedness no dizziness. pt w no abd pain. she has follow up w/ OBGYN on Tuesday, she reports that she is compliant w/ txa has completed 3 doses, 650 q8 hours for severe bleeding. On exam, soft abdomen speakign in full sentances, gyn performed pelvic exam, w/ no active bleeding. pt w stable hgb. plan for likely outpt follow up w/ gyn on tuesday. pt given strict return precatuions.

## 2022-07-09 NOTE — ED PROVIDER NOTE - OBJECTIVE STATEMENT
pt p/w vaginal bleeding, severe, for last 2 days . soaked numerous pads today. assoc c mild pelvic cramps and anxiety. states she hasn't slept much due to anxiety from this and feels exhausted. no cp/sob. no fever. was seen here for same yesterday and started on TXA iv in ED and taking PO at home last dose 6pm. scheduled to see GYN DR Lyons 7/12. also had 9 days irreg bleeding few weeks ago . was seen here 6/24, had sono with normal ovaries, 20mm endometrium pt p/w vaginal bleeding, severe, for last 2 days . soaked numerous pads today. assoc c mild pelvic cramps and anxiety. states she hasn't slept much due to anxiety from this and feels exhausted. no cp/sob. no fever. was seen here for same yesterday and started on TXA iv in ED and taking PO at home last dose 6pm. scheduled to see GYN DR Lyons 7/12. also had 9 days irreg bleeding few weeks ago . was seen here 6/24, had sono with normal ovaries, 20mm endometrium    other MRN [id number] pt p/w vaginal bleeding, severe, for last 2 days . soaked numerous pads today. assoc c mild pelvic cramps and anxiety. states she hasn't slept much due to anxiety from this and feels exhausted. no cp/sob. no fever. was seen here for same yesterday and started on TXA iv in ED and taking PO at home last dose 6pm and 2 am. scheduled to see GYN DR Lyons 7/12. also had 9 days irreg bleeding few weeks ago . was seen here 6/24, had sono with normal ovaries, 20mm endometrium    other MRN [id number]

## 2022-07-09 NOTE — ED PROVIDER NOTE - CLINICAL SUMMARY MEDICAL DECISION MAKING FREE TEXT BOX
pt p/w irregular vag bleeding last month and recurring again last 2 days. seen earlier today and started on txa. pt reports continued bleeding. HDS. well appearing. will recheck h/h. monitor hemodynamics. pt p/w irregular vag bleeding last month and recurring again last 2 days. seen earlier today and started on txa. pt reports continued bleeding. HDS. well appearing. will recheck h/h. monitor hemodynamics.    hgb 11.5 (12.1 yesterday).  no signif bleeding in ED. HDS.  continue TXA. f/u gyn

## 2022-07-09 NOTE — ED ADULT NURSE NOTE - OBJECTIVE STATEMENT
Patient presents to ED with complaint of vaginal bleeding x 2 days. No signs or symptoms of nausea, vomiting, dizziness, SOB, discomfort or pain. Alert and oriented x 4.

## 2022-07-09 NOTE — ED PROVIDER NOTE - OBJECTIVE STATEMENT
Ms. Hurtado is a 51F with h/o FORREST, HTN, Polycystic Kidney Dz, Ms. Hurtado is a 51F with h/o FORREST, HTN, Polycystic Kidney Dz who presents reporting significant Ms. Hurtado is a 51F with h/o FORREST, HTN, Polycystic Kidney Dz who presents reporting significant vaginal bleeding for almost two weeks. It had initially stopped but starting 7/7 she started again having significant bleeding. Now, she

## 2022-07-09 NOTE — ED PROVIDER NOTE - PRIOR HOSPITAL/ED VISITS SUMMARY FREE TEXT FOR MDM OBTAINED AND REVIEWED OLD RECORDS QUESTION
was seen here 6/24, had sono with normal ovaries, 20mm endometrium.  yesterday's chart/labs reviewed. hgb 12.1

## 2022-07-09 NOTE — ED ADULT NURSE NOTE - HIV OFFER
Admission medication history interview status for the 10/9/2019  admission is complete. See EPIC admission navigator for prior to admission medications     Medication history source reliability:Good    Actions taken by pharmacist (provider contacted, etc):called [facility] and interviewed patient     Additional medication history information not noted on PTA med list :pt states her [facility] prescription still says 1/2 tablet of losartan daily, but MD increased to whole 25mg tablet daily. Also, takes 10mg amlodipine once a day instead of 5mg bid because she would often forget to take second dose of the day when she split them up.    Medication reconciliation/reorder completed by provider prior to medication history? No    Time spent in this activity: 15 minutes    Prior to Admission medications    Medication Sig Last Dose Taking? Auth Provider   amLODIPine (NORVASC) 5 MG tablet TAKE 1 TABLET BY MOUTH TWICE DAILY WITH MEALS  Patient taking differently: Take 10 mg by mouth daily  10/8/2019 at Unknown time Yes Danny Conteh MD   losartan (COZAAR) 25 MG tablet Take 25 mg by mouth daily 10/9/2019 at Unknown time Yes Unknown, Entered By History   sertraline (ZOLOFT) 100 MG tablet TAKE 1 TABLET(100 MG) BY MOUTH DAILY 10/9/2019 at Unknown time Yes Danny Conteh MD   vitamin D3 (CHOLECALCIFEROL) 2000 units (50 mcg) tablet Take 1 tablet (2,000 Units) by mouth daily 10/9/2019 at Unknown time Yes Candice Interiano MD   albuterol (PROVENTIL) (2.5 MG/3ML) 0.083% neb solution USE 1 VIAL BY NEBULIZATION EVERY 6 HOURS AS NEEDED FOR SHORTNESS OF BREATH/ DYSPNEA prn  Danny Conteh MD   amoxicillin (AMOXIL) 500 MG capsule TK 4 CS PO ONE HOUR BEFORE DENTAL APPOINTMENT prn  Reported, Patient   guaiFENesin-codeine (ROBITUSSIN AC) 100-10 MG/5ML SOLN Take 10 mLs by mouth every 4 hours as needed for cough  Patient not taking: Reported on 3/15/2018 prn  Drea Mcadams PA-C          Opt out

## 2022-07-09 NOTE — CONSULT NOTE ADULT - ASSESSMENT
50yo  LMP 10 days ago comes in with 1 year of irregular and heavier menses with recent admissions to [facility] and d/c'ed home on TXA coming in with similar complaint of heavy vaginal bleeding. Patient does not have active vaginal bleeding and is hemodynamically stable    # AUB  - Perimenopausal vs. endometrial polyp  - H/H wnl at 11.5/37.9, normocardic, hypertensive (has not taken BP meds today)  - No active bleeding from os, asymptomatic  - c/w TXA, may take OTC iron and Vit C  - Recommend close follow up with OBGYN Dr. Lyons on Tuesday for AUB workup including EMB with reported history of endometrial polyp. Further treatment for AUB include progesterone IUD, OCP, continuing with TXA.  - Return precautions reviewed with patient including saturating a maxi pad in an hour for 2 hours or feeling symptoms of acute blood loss anemia such as lightheadedness, dizziness, SOB, CP.    d/w KELLY Hutchinson  PGY-2 52yo  LMP 10 days ago comes in with 1 year of irregular and heavier menses with recent admissions to [facility] and d/c'ed home on TXA coming in with similar complaint of heavy vaginal bleeding. Patient does not have active vaginal bleeding and is hemodynamically stable    # AUB  - Perimenopausal vs. AUB due to endometrial polyp  - H/H wnl at 11.5/37.9, normocardic, hypertensive (has not taken BP meds today)  - No active bleeding from os, asymptomatic  - c/w TXA, may take OTC iron and Vit C  - Recommend close follow up with OBGYN Dr. Lyons on Tuesday for AUB workup including EMB with reported history of endometrial polyp. Further treatment for AUB include progesterone IUD, OCP, continuing with TXA.  - Return precautions reviewed with patient including saturating a maxi pad in an hour for 2 hours or feeling symptoms of acute blood loss anemia such as lightheadedness, dizziness, SOB, CP.    d/w KELLY Hutchinson  PGY-2    Pt discussed with me. I agree with the above note and plan of care.  Silvia Tam MD

## 2022-07-09 NOTE — ED ADULT TRIAGE NOTE - CHIEF COMPLAINT QUOTE
vag bleeding Sarahi 20 x 10 days stopped and started 7/7 , worsening bleeding has been seen at [facility] C/o tired   Abd cramping

## 2022-07-09 NOTE — ED PROVIDER NOTE - PATIENT PORTAL LINK FT
You can access the FollowMyHealth Patient Portal offered by [facility] by registering at the following website: [email or web address]. By joining [facility]’s FollowMyHealth portal, you will also be able to view your health information using other applications (apps) compatible with our system.

## 2022-07-09 NOTE — ED PROVIDER NOTE - PHYSICAL EXAMINATION
GEN: Awake, AOx3, NAD.  HEENT: NCAT  ---EYES: no scleral icterus, EOMI, PERRLA  CARDIO: RRR. Normal S1/S2, no m/r/g. No JVD.  RESP: CTAB, no w/r/r  ABD: Soft, NTND. BS+.  MSK: No obvious deformity or ROM deficit. 2+ pulses x4. No edema.  SKIN: Warm, dry. No rashes. Nail beds without cyanosis or clubbing.  NEURO: Moves all four extremities spontaneously  PSYCH: Appropriate mood & affect.

## 2022-07-13 ENCOUNTER — APPOINTMENT (OUTPATIENT)
Dept: OBGYN | Facility: CLINIC | Age: 52
End: 2022-07-13

## 2022-07-13 ENCOUNTER — LABORATORY RESULT (OUTPATIENT)
Age: 52
End: 2022-07-13

## 2022-07-13 VITALS
HEIGHT: 71 IN | HEART RATE: 66 BPM | TEMPERATURE: 97.2 F | WEIGHT: 230 LBS | BODY MASS INDEX: 32.2 KG/M2 | DIASTOLIC BLOOD PRESSURE: 62 MMHG | SYSTOLIC BLOOD PRESSURE: 108 MMHG | OXYGEN SATURATION: 98 %

## 2022-07-13 DIAGNOSIS — Z87.448 PERSONAL HISTORY OF OTHER DISEASES OF URINARY SYSTEM: ICD-10-CM

## 2022-07-13 DIAGNOSIS — Z00.00 ENCOUNTER FOR GENERAL ADULT MEDICAL EXAMINATION W/OUT ABNORMAL FINDINGS: ICD-10-CM

## 2022-07-13 DIAGNOSIS — I10 ESSENTIAL (PRIMARY) HYPERTENSION: ICD-10-CM

## 2022-07-13 DIAGNOSIS — Z83.3 FAMILY HISTORY OF DIABETES MELLITUS: ICD-10-CM

## 2022-07-13 DIAGNOSIS — Z82.49 FAMILY HISTORY OF ISCHEMIC HEART DISEASE AND OTHER DISEASES OF THE CIRCULATORY SYSTEM: ICD-10-CM

## 2022-07-13 DIAGNOSIS — N92.0 EXCESSIVE AND FREQUENT MENSTRUATION WITH REGULAR CYCLE: ICD-10-CM

## 2022-07-13 PROCEDURE — 99203 OFFICE O/P NEW LOW 30 MIN: CPT

## 2022-07-13 RX ORDER — AMLODIPINE BESYLATE 10 MG/1
10 TABLET ORAL
Qty: 90 | Refills: 0 | Status: ACTIVE | COMMUNITY
Start: 2021-09-20

## 2022-07-13 NOTE — HISTORY OF PRESENT ILLNESS
[FreeTextEntry1] : Heavy vaginal bleeding  Multiple ED visits  and NS  Last seen 7/8/22 Given TXA Med stopped yesterday\par \par Heavy bleeding from 6/20/22 for 9 days Seen by Gyn at work + endometrial polyp  Sched for polypectomy end of July\par \par Bleeding resolved now\par \par Seen at [facility] ED 7/9/22  Advised to continue TXA [N] : Patient does not use contraception [Y] : Patient is sexually active [Monogamous (Male Partner)] : is monogamous with a male partner [LMPDate] : 6/20/22 [PGHxABSpont] : 1

## 2022-07-13 NOTE — DISCUSSION/SUMMARY
[FreeTextEntry1] : Patient advised to follow up with sonohysterogram or in office hysteroscopy as available

## 2022-07-13 NOTE — PHYSICAL EXAM
[Chaperone Present] : A chaperone was present in the examining room during all aspects of the physical examination [FreeTextEntry1] : EZEKIEL Junior [Appropriately responsive] : appropriately responsive [Alert] : alert [No Acute Distress] : no acute distress [No Lymphadenopathy] : no lymphadenopathy [Non-tender] : non-tender [Non-distended] : non-distended [No HSM] : No HSM [No Lesions] : no lesions [No Mass] : no mass [Oriented x3] : oriented x3 [Examination Of The Breasts] : a normal appearance [No Discharge] : no discharge [No Masses] : no breast masses were palpable [Labia Majora] : normal [Labia Minora] : normal [No Bleeding] : There was no active vaginal bleeding [Soft] : soft [Normal] : normal [Normal Position] : in a normal position [Tenderness] : nontender [Enlarged ___ wks] : not enlarged [Mass ___ cm] : no uterine mass was palpated [Uterine Adnexae] : normal

## 2022-07-22 ENCOUNTER — NON-APPOINTMENT (OUTPATIENT)
Age: 52
End: 2022-07-22

## 2022-07-22 DIAGNOSIS — N76.0 ACUTE VAGINITIS: ICD-10-CM

## 2022-07-22 DIAGNOSIS — B96.89 ACUTE VAGINITIS: ICD-10-CM

## 2022-07-22 LAB
CANDIDA VAG CYTO: NOT DETECTED
G VAGINALIS+PREV SP MTYP VAG QL MICRO: DETECTED
HPV HIGH+LOW RISK DNA PNL CVX: DETECTED
T VAGINALIS VAG QL WET PREP: NOT DETECTED

## 2022-07-23 LAB
C TRACH RRNA SPEC QL NAA+PROBE: NOT DETECTED
HCG UR QL: NEGATIVE
N GONORRHOEA RRNA SPEC QL NAA+PROBE: NOT DETECTED
QUALITY CONTROL: YES
SOURCE AMPLIFICATION: NORMAL

## 2022-09-08 NOTE — ED ADULT TRIAGE NOTE - TEMPERATURE IN FAHRENHEIT (DEGREES F)
97.9
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

## 2022-12-16 ENCOUNTER — NON-APPOINTMENT (OUTPATIENT)
Age: 52
End: 2022-12-16

## 2023-03-04 NOTE — ED ADULT NURSE NOTE - PAIN: RADIATION
[facility] ([facility])  Rehabilitation  [facility] [address], [address] [place] [address]  Phone: [phone]  Fax:     
abdominal cramping

## 2023-03-20 PROBLEM — F41.9 ANXIETY DISORDER, UNSPECIFIED: Chronic | Status: ACTIVE | Noted: 2022-07-09

## 2023-03-20 PROBLEM — Q61.3 POLYCYSTIC KIDNEY, UNSPECIFIED: Chronic | Status: ACTIVE | Noted: 2022-07-09

## 2023-03-22 ENCOUNTER — NON-APPOINTMENT (OUTPATIENT)
Age: 53
End: 2023-03-22

## 2023-03-23 PROBLEM — Z86.79 HISTORY OF MITRAL VALVE INSUFFICIENCY: Status: RESOLVED | Noted: 2023-03-23 | Resolved: 2023-03-23

## 2023-03-23 PROBLEM — Z86.39 HISTORY OF OBESITY: Status: RESOLVED | Noted: 2023-03-23 | Resolved: 2023-03-23

## 2023-03-23 PROBLEM — Z86.59 HISTORY OF ANXIETY: Status: RESOLVED | Noted: 2023-03-23 | Resolved: 2023-03-23

## 2023-03-23 PROBLEM — Z86.79 HISTORY OF HYPERTENSION: Status: RESOLVED | Noted: 2023-03-23 | Resolved: 2023-03-23

## 2023-03-23 PROBLEM — Z86.59 HISTORY OF DEPRESSION: Status: RESOLVED | Noted: 2023-03-23 | Resolved: 2023-03-23

## 2023-03-23 PROBLEM — Z78.9 NO FAMILY HISTORY OF CANCER: Status: ACTIVE | Noted: 2023-03-23

## 2023-03-23 RX ORDER — TRANEXAMIC ACID 650 MG/1
650 TABLET ORAL
Qty: 30 | Refills: 0 | Status: DISCONTINUED | COMMUNITY
Start: 2022-07-08 | End: 2023-03-23

## 2023-03-23 RX ORDER — HYDROXYZINE HYDROCHLORIDE 25 MG/1
25 TABLET ORAL
Refills: 0 | Status: ACTIVE | COMMUNITY

## 2023-03-23 RX ORDER — METRONIDAZOLE 500 MG/1
500 TABLET ORAL TWICE DAILY
Qty: 14 | Refills: 1 | Status: DISCONTINUED | COMMUNITY
Start: 2022-07-22 | End: 2023-03-23

## 2023-03-23 RX ORDER — CLOTRIMAZOLE AND BETAMETHASONE DIPROPIONATE 10; .5 MG/G; MG/G
1-0.05 CREAM TOPICAL
Qty: 45 | Refills: 0 | Status: DISCONTINUED | COMMUNITY
Start: 2022-06-30 | End: 2023-03-23

## 2023-03-27 ENCOUNTER — NON-APPOINTMENT (OUTPATIENT)
Age: 53
End: 2023-03-27

## 2023-03-30 ENCOUNTER — APPOINTMENT (OUTPATIENT)
Dept: SURGICAL ONCOLOGY | Facility: CLINIC | Age: 53
End: 2023-03-30
Payer: COMMERCIAL

## 2023-03-30 VITALS
RESPIRATION RATE: 16 BRPM | DIASTOLIC BLOOD PRESSURE: 82 MMHG | BODY MASS INDEX: 32.2 KG/M2 | WEIGHT: 230 LBS | OXYGEN SATURATION: 98 % | HEIGHT: 71 IN | SYSTOLIC BLOOD PRESSURE: 136 MMHG | HEART RATE: 72 BPM

## 2023-03-30 DIAGNOSIS — Z86.59 PERSONAL HISTORY OF OTHER MENTAL AND BEHAVIORAL DISORDERS: ICD-10-CM

## 2023-03-30 DIAGNOSIS — Z86.79 PERSONAL HISTORY OF OTHER DISEASES OF THE CIRCULATORY SYSTEM: ICD-10-CM

## 2023-03-30 DIAGNOSIS — Z78.9 OTHER SPECIFIED HEALTH STATUS: ICD-10-CM

## 2023-03-30 DIAGNOSIS — Z86.39 PERSONAL HISTORY OF OTHER ENDOCRINE, NUTRITIONAL AND METABOLIC DISEASE: ICD-10-CM

## 2023-03-30 PROCEDURE — 99205 OFFICE O/P NEW HI 60 MIN: CPT

## 2023-04-06 NOTE — RESULTS/DATA
[FreeTextEntry1] : CT scan in 2003: multiple bilateral renal and hepatic cysts. \par \par US of the abdomen on 7/28/20 revealed liver (18.9 cm) and left kidney appear enlarged, numerous cysts in the liver largest 6.4 cm and b/l kidneys. Small amount of gallbladder sludge present. \par \par US on 6/6/2022 revealed hepatomegaly and innumerable hepatic and renal cysts. Splenic lesion measuring 6.2 x 6.7 x 6.3 cm, no internal vascularity is noted, not seen in prior study (7/28/2020). CT/MRI recommended. \par \par MRI on 6/15/2022 this showed borderline in size spleen (13cm), mass involving the posterior aspect of the spleen measuring 7 x 6 cm.  This is essentially isointense to the spleen on the T2 weighted images, and is isointense on the precontrast T1 weighted images. This does not contain internal fat. This shows mildly heterogeneous enhancement on the early postcontrast images. The appearance of a thin rim may be adjacent compressed parenchyma. The mass becomes isodense to the spleen on the later postcontrast images. There are innumerable b/l renal cysts and hepatic cysts compatible with polycystic kidney disease. FNA/biopsy recommended. \par \par US on 8/23/22 to asses for growth/stability of the splenic lesion showed an enlarged spleen (14.2) and a stable splenic lesion. \par \par US 2/6/23 Spleen is upper limits of normal (12.3 cm). Hyperechoic lesion within the spleen measuring 7 x 5.4 x 5.8 cm, previously measured 7.4 x 5.5 x 6.6 cm. \par \par Labs: 7/2022 WBC 10.18 RBC 4.72 HGB 11.5 Cr 0.69 BUN 12 TBili 0.3 ALK PHOS 90 AST 14 ALT 11, PT 13.6, INR 1.17

## 2023-04-06 NOTE — ASSESSMENT
[FreeTextEntry1] : 51 y/o female with a history of polycystic kidney disease presents with an incidental finding of a 7 cm splenic mass seen since 2022. \par \par After discussion with our radiologist, our clinical suspicion is that this is not a malignancy given the appearance, however, the lesion also does not have clear benign features and is considered an 'intermediate lesion". I had a long discussion with the patient that in case of a concerning malignancy I would recommend resection versus a biopsy. \par \par Our current recommendation is to obtain a repeat MRI to assess for stability of the splenic mass. If the mass is stable then we would surveillance, however, if there is a growth or concerning features, we would discuss resection for diagnostic and therapeutic intent.  \par \par We also discussed the role of cyst fenestration for her liver cysts in the presence of symptoms in the future. \par \par She asked many good questions and I will see her after her MRI. \par \par Sincerely,\par    \par Aundrea Marte MD\par Director of Liver Multi-Disciplinary [facility] \par Division of Surgical Oncology\par \par \par

## 2023-04-23 ENCOUNTER — NON-APPOINTMENT (OUTPATIENT)
Age: 53
End: 2023-04-23

## 2023-05-10 NOTE — ED PROVIDER NOTE - HIV OFFER
[FreeTextEntry1] : THALIA MALONE is a 73 year old woman who presents with sx below, previously following with Dr Abebe - [facility] -- was on Mobic with him for joint pain below which has been helpful and without SE, not sure what her dx was -- \par \par + b/l hand pain and bony nodularity over DIPs and PIPs, difficulty with some activities \par \par Inflammatory arthritis ROS negative for symmetrical peripheral joint synovitis, prolonged AM stiffness, enthesitis, dactylitis, psoriasis/ rashes, eye inflammation, inflammatory low back pain, IBD. \par \par SLE ROS negative for focal alopecia, sicca, salivary gland swelling, oral ulcers, malar rash, photosensitivity, SOB, chest pain, serositis, abd pain, dysuria, hematuria, rash, joint AM stiffness/synovitis, hematologic abnormalities, Raynauds. \par \par Negative FH for autoimmune d/o, paternal aunts with similar OA changes to hands \par \par + prior meniscal sx on R knee \par + traumatic L foot fracture, healed without needing sx, past DEXA has osteopenia \par + stable frontal fibrosing alopecia 
Opt out

## 2023-05-30 ENCOUNTER — NON-APPOINTMENT (OUTPATIENT)
Age: 53
End: 2023-05-30

## 2023-06-01 ENCOUNTER — OUTPATIENT (OUTPATIENT)
Dept: OUTPATIENT SERVICES | Facility: HOSPITAL | Age: 53
LOS: 1 days | End: 2023-06-01
Payer: COMMERCIAL

## 2023-06-01 ENCOUNTER — APPOINTMENT (OUTPATIENT)
Dept: MRI IMAGING | Facility: HOSPITAL | Age: 53
End: 2023-06-01
Payer: COMMERCIAL

## 2023-06-01 DIAGNOSIS — D73.89 OTHER DISEASES OF SPLEEN: ICD-10-CM

## 2023-06-01 PROCEDURE — 74183 MRI ABD W/O CNTR FLWD CNTR: CPT

## 2023-06-01 PROCEDURE — 74183 MRI ABD W/O CNTR FLWD CNTR: CPT | Mod: 26

## 2023-06-01 PROCEDURE — A9579: CPT

## 2023-06-08 ENCOUNTER — APPOINTMENT (OUTPATIENT)
Dept: SURGICAL ONCOLOGY | Facility: CLINIC | Age: 53
End: 2023-06-08
Payer: COMMERCIAL

## 2023-06-08 PROCEDURE — 99203 OFFICE O/P NEW LOW 30 MIN: CPT | Mod: 95

## 2023-06-12 NOTE — RESULTS/DATA
[FreeTextEntry1] : CT scan in 2003: multiple bilateral renal and hepatic cysts. \par \par US of the abdomen on 7/28/20 revealed liver (18.9 cm) and left kidney appear enlarged, numerous cysts in the liver largest 6.4 cm and b/l kidneys. Small amount of gallbladder sludge present. \par \par US on 6/6/2022 revealed hepatomegaly and innumerable hepatic and renal cysts. Splenic lesion measuring 6.2 x 6.7 x 6.3 cm, no internal vascularity is noted, not seen in prior study (7/28/2020). CT/MRI recommended. \par \par MRI on 6/15/2022 this showed borderline in size spleen (13cm), mass involving the posterior aspect of the spleen measuring 7 x 6 cm.  This is essentially isointense to the spleen on the T2 weighted images, and is isointense on the precontrast T1 weighted images. This does not contain internal fat. This shows mildly heterogeneous enhancement on the early postcontrast images. The appearance of a thin rim may be adjacent compressed parenchyma. The mass becomes isodense to the spleen on the later postcontrast images. There are innumerable b/l renal cysts and hepatic cysts compatible with polycystic kidney disease. FNA/biopsy recommended. \par \par US on 8/23/22 to asses for growth/stability of the splenic lesion showed an enlarged spleen (14.2) and a stable splenic lesion. \par \par US 2/6/23 Spleen is upper limits of normal (12.3 cm). Hyperechoic lesion within the spleen measuring 7 x 5.4 x 5.8 cm, previously measured 7.4 x 5.5 x 6.6 cm. \par \par MRI 6/1/23 8.0 x 7.5 x 7.1 cm enhancing not characterize splenic lesion which appears solid in nature. \par Findings consistent with polycystic kidneys and liver. At that time the patient's splenic lesion measured approximate 7.2 x 7.3 cm in size. Today measuring 8.0 x 7.5 cm measured the same location Difficult to measure on the coronal images from the outside examination. Is better delineated on today's exam which may be technical in nature.\par \par \par Labs: 7/2022 WBC 10.18 RBC 4.72 HGB 11.5 Cr 0.69 BUN 12 TBili 0.3 ALK PHOS 90 AST 14 ALT 11, PT 13.6, INR 1.17

## 2023-06-12 NOTE — ASSESSMENT
[FreeTextEntry1] : 53 y/o female with a history of polycystic kidney disease presents with an incidental finding of a 8 cm splenic mass seen since 2022. \par \par She is feeling well but is having some abdominal discomfort. I have reviewed her scans with radiology and the most likely diagnosis is a benign lesion called AMANDA but it is still difficult to rule out a malignancy 100% by imaging. \par I have therefore discussed with the patient that we will present her case to [facility] next week to discuss the role of a splenectomy vs a biopsy. We discussed that even with a biopsy we may not be able to rule out malignancy and the only definitive way would be to perform a splenectomy and confirm with pathology as gold standard. \par \par We also discussed the role of cyst fenestration for her liver cysts during the splenectomy. She will follow up with me in 2 weeks to discuss her plan of care.  \par \par She asked many good questions that I answered to the best of my ability. \par \par Sincerely,\par    \par Aundrea Marte MD\par Director of Liver Multi-Disciplinary [facility] \par Division of Surgical Oncology\par \par \par

## 2023-06-12 NOTE — HISTORY OF PRESENT ILLNESS
[de-identified] : This telephonic visit was provided via real-time 2 way audio visual technology. The patient, LINSEY HURTADO, was located at home, [address] [place] [address] , at the time of the visit. \par The provider, was located at at the clinic at [address] [place] [address] at the time of the visit. The patient, LINSEY HURTADO  and provider participated in the telehealth encounter. \par Verbal consent was given on 06/08/2023  by the patient.\par \par Linsey Hurtado is a 52 yr old female, presenting today for a follow up Surgical Oncology consultation regarding a splenic mass. \par \par She was diagnosed at age 32 with polycystic kidney disease and has been monitoring her kidney and liver for cysts ever since with yearly ultrasounds. \par \par She underwent a CT scan in 2003 for abdominal pain at [facility] which revealed multiple bilateral renal and hepatic cysts. We have the report but the CT is no longer available. \par \par An US performed on 6/6/2022 revealed hepatomegaly and innumerable hepatic and renal cysts in addition to a splenic lesion measuring 6.2 x 6.7 x 6.3 cm, not seen in prior study (7/28/2020). \par \par She subsequently underwent an MRI on 6/15/2022 which showed borderline in size spleen (13cm), mass involving the posterior aspect of the spleen measuring 7 x 6 cm, which does not show typical enhancement features of hemangioma. Repeat US on 2/6/23 to asses for growth/stability of the splenic lesion revealed a hyperechoic lesion within the spleen measuring 7 x 5.4 x 5.8 cm, previously measured 7.4 x 5.5 x 6.6 cm. \par \par A repeat MRI performed on 6/1/23 revealed a slight increase in size of the splenic lesion measuring  8.0 x 7.5 cm, previously measured 7.2 x 7.3 cm in size.\par \par Today she is feeling well, and expresses discomfort in her abdomen. She is anxious about the splenic lesion and her hepatic cysts that she says she can palpate and are causing her some disconfort.

## 2023-06-22 ENCOUNTER — APPOINTMENT (OUTPATIENT)
Dept: SURGICAL ONCOLOGY | Facility: CLINIC | Age: 53
End: 2023-06-22
Payer: COMMERCIAL

## 2023-06-22 VITALS
OXYGEN SATURATION: 98 % | SYSTOLIC BLOOD PRESSURE: 139 MMHG | WEIGHT: 235 LBS | HEIGHT: 71 IN | DIASTOLIC BLOOD PRESSURE: 76 MMHG | HEART RATE: 66 BPM | BODY MASS INDEX: 32.9 KG/M2 | RESPIRATION RATE: 16 BRPM

## 2023-06-22 PROCEDURE — 99215 OFFICE O/P EST HI 40 MIN: CPT

## 2023-06-24 NOTE — PHYSICAL EXAM
[FreeTextEntry1] : \par General - pleasant well developed \par Head/Face - Atraumatic\par Eyes - PERRL, no sclera icterus\par ENMT - MMM, normal nose/ears, no oropharyngeal lesion\par Neck - supple, no thyromegaly, no masses   \par Lymphatics - no palpable adenopathy\par CV - Regular Rate and Rhythm, no rub or gallop\par Pulm - CTAB\par Abd - soft, non-tender, nondistended, palpable liver cyst upper abdomen.   \par Skin - no skin lesion on sun exposed skin, no jaundice\par Neuro - alert and oriented x 3\par Psych - normal mood and affect\par \par \par

## 2023-06-24 NOTE — HISTORY OF PRESENT ILLNESS
[de-identified] : Linsey Hurtado is a 52 yr old female, presenting today for a follow up Surgical Oncology consultation regarding a splenic mass. \par \par She was diagnosed at age 32 with polycystic kidney disease and has been monitoring her kidney and liver for cysts ever since with yearly ultrasounds. \par \par She underwent a CT scan in 2003 for abdominal pain at [facility] which revealed multiple bilateral renal and hepatic cysts.\par \par An US performed on 6/6/2022 revealed hepatomegaly and innumerable hepatic and renal cysts in addition to a splenic lesion measuring 6.2 x 6.7 x 6.3 cm, not seen in prior study (7/28/2020). \par \par She subsequently underwent an MRI on 6/15/2022 which showed borderline in size spleen (13cm), mass involving the posterior aspect of the spleen measuring 7 x 6 cm, which does not show typical enhancement features of hemangioma. A repeat MRI performed on 6/1/23 revealed a slight increase in size of the splenic lesion measuring  8.0 x 7.5 cm, previously measured 7.2 x 7.3 cm in size. \par \par Today she presents for follow up and notes that she is feeling well, and expresses mild discomfort in her abdomen where the 'cysts' are.  She is anxious about the splenic lesion and potential surgery. Her case was presented at [facility] and the consensus was to perform a splenectomy vs PET and biopsy, with the understanding that the gold standard to rule out malignancy would be final pathology.

## 2023-06-24 NOTE — ASSESSMENT
[FreeTextEntry1] : 51 y/o female with a history of polycystic kidney disease presents with an incidental finding of a 8 cm splenic mass seen since 2022 with some enlargement since her last MRI. \par \par I have reviewed her scans with radiology and the most likely diagnosis is a benign lesion called AMANDA but it is still difficult to rule out a malignancy 100% by imaging. \par We already discussed that even with a biopsy we may not be able to rule out malignancy and the only definitive way would be to perform a splenectomy and confirm with pathology as gold standard. We discussed what a splenectomy operation would entail.  We also discussed the role of cyst fenestration for her liver cysts during the splenectomy.  I would perform the surgery as robotic assisted due to mass size, we also discussed post operative recovery, and vaccines post splenectomy. \par \par We discussed her case at [facility]- and the consensus was for a PET/CT with possible biopsy versus splenectomy. After a full discussion, the patient prefers to undergo a PET scan to help her with her decision making and take this in steps. We fully respect her opinion and will see her after that test and lab. She will follow up with me in 3 weeks to discuss her plan of care.\par \par She asked many good questions that I answered to the best of my ability. \par \par Sincerely,\par    \par Aundrea Marte MD\par Division of Surgical Oncology\par Director, Liver Multi-Disciplinary Clinic & Hepatic Artery Infusion Pump Program\par Director, Translational Research in Surgical Oncology\par \par \par \par

## 2023-07-09 ENCOUNTER — APPOINTMENT (OUTPATIENT)
Dept: NUCLEAR MEDICINE | Facility: CLINIC | Age: 53
End: 2023-07-09
Payer: COMMERCIAL

## 2023-07-09 ENCOUNTER — OUTPATIENT (OUTPATIENT)
Dept: OUTPATIENT SERVICES | Facility: HOSPITAL | Age: 53
LOS: 1 days | End: 2023-07-09
Payer: COMMERCIAL

## 2023-07-09 DIAGNOSIS — D73.89 OTHER DISEASES OF SPLEEN: ICD-10-CM

## 2023-07-09 PROCEDURE — A9552: CPT

## 2023-07-09 PROCEDURE — 78815 PET IMAGE W/CT SKULL-THIGH: CPT

## 2023-07-09 PROCEDURE — 78815 PET IMAGE W/CT SKULL-THIGH: CPT | Mod: 26,PI

## 2023-07-13 ENCOUNTER — APPOINTMENT (OUTPATIENT)
Dept: SURGICAL ONCOLOGY | Facility: CLINIC | Age: 53
End: 2023-07-13
Payer: COMMERCIAL

## 2023-07-13 PROCEDURE — 99214 OFFICE O/P EST MOD 30 MIN: CPT | Mod: 95

## 2023-08-07 NOTE — DATA REVIEWED
[FreeTextEntry1] : PET/CT and CT chest images reviewed and results discussed at length with the patient.

## 2023-08-07 NOTE — HISTORY OF PRESENT ILLNESS
[FreeTextEntry1] : 52 years old female with hx of HTN, diagnosed at age 32 with polycystic kidney disease and has been monitoring her kidney and liver for cysts ever since with yearly ultrasounds. An US performed on 6/6/2022 revealed hepatomegaly and innumerable hepatic and renal cysts in addition to a splenic lesion measuring 6.2 x 6.7 x 6.3 cm, not seen in prior study (7/28/2020). Her case was presented at [facility] and the consensus was to perform a splenectomy vs PET and biopsy, with the understanding that the gold standard to rule out malignancy would be final pathology. On 7/9/23 she underwent a PET/CT which showed no FDG avidity in the splenic lesion. Patient was seen by Dr. Marte and is being reefrred for consulttaion to discuss splenic lesion bx.   Denies any recent SOB, CP, fever, chills, n/v/d. ??/

## 2023-08-07 NOTE — HISTORY OF PRESENT ILLNESS
[FreeTextEntry1] : 52 years old female with hx of HTN, diagnosed at age 32 with polycystic kidney disease and has been monitoring her kidney and liver for cysts ever since with yearly ultrasounds. An US performed on 6/6/2022 revealed hepatomegaly and innumerable hepatic and renal cysts in addition to a splenic lesion measuring 6.2 x 6.7 x 6.3 cm, not seen in prior study (7/28/2020). Her case was presented at [facility] and the consensus was to perform a splenectomy vs PET and biopsy, with the understanding that the gold standard to rule out malignancy would be final pathology. On 7/9/23 she underwent a PET/CT which showed no FDG avidity in the splenic lesion. Patient was seen by Dr. Marte and is being reefrred for consulttaion to discuss splenic lesion bx.   Denies any recent SOB, CP, fever, chills, n/v/d. ??/

## 2023-08-08 NOTE — RESULTS/DATA
[FreeTextEntry1] : CT scan in 2003: multiple bilateral renal and hepatic cysts. \par \par US of the abdomen on 7/28/20 revealed liver (18.9 cm) and left kidney appear enlarged, numerous cysts in the liver largest 6.4 cm and b/l kidneys. Small amount of gallbladder sludge present. \par \par US on 6/6/2022 revealed hepatomegaly and innumerable hepatic and renal cysts. Splenic lesion measuring 6.2 x 6.7 x 6.3 cm, no internal vascularity is noted, not seen in prior study (7/28/2020). CT/MRI recommended. \par \par MRI on 6/15/2022 this showed borderline in size spleen (13cm), mass involving the posterior aspect of the spleen measuring 7 x 6 cm.  This is essentially isointense to the spleen on the T2 weighted images, and is isointense on the precontrast T1 weighted images. This does not contain internal fat. This shows mildly heterogeneous enhancement on the early postcontrast images. The appearance of a thin rim may be adjacent compressed parenchyma. The mass becomes isodense to the spleen on the later postcontrast images. There are innumerable b/l renal cysts and hepatic cysts compatible with polycystic kidney disease. FNA/biopsy recommended. \par \par US on 8/23/22 to asses for growth/stability of the splenic lesion showed an enlarged spleen (14.2) and a stable splenic lesion. \par \par US 2/6/23 Spleen is upper limits of normal (12.3 cm). Hyperechoic lesion within the spleen measuring 7 x 5.4 x 5.8 cm, previously measured 7.4 x 5.5 x 6.6 cm. \par \par MRI 6/1/23 8.0 x 7.5 x 7.1 cm enhancing not characterize splenic lesion which appears solid in nature. \par Findings consistent with polycystic kidneys and liver. At that time the patient's splenic lesion measured approximate 7.2 x 7.3 cm in size. Today measuring 8.0 x 7.5 cm measured the same location Difficult to measure on the coronal images from the outside examination. Is better delineated on today's exam which may be technical in nature.\par \par 7/9/23 PET/CT 1. Mild splenomegaly with no distinct focal abnormal activity that is concerning for FDG avid malignant process. Distribution and intensity of activity throughout the spleen is relatively uniform. The enhancing lesion on MRI is not delineated on this examination.\par 2. Nonavid hypodensities in the liver and kidneys compatible with known multicystic lesions.\par 3. Elongated activity within the endometrial canal and nonavid bilateral ovarian hypodensities may represent physiologic changes in this patient. Please correlate with menstrual history.\par \par \par Labs: 7/2022 WBC 10.18 RBC 4.72 HGB 11.5 Cr 0.69 BUN 12 TBili 0.3 ALK PHOS 90 AST 14 ALT 11, PT 13.6, INR 1.17

## 2023-08-08 NOTE — ASSESSMENT
[FreeTextEntry1] : 53 y/o female with a history of polycystic kidney disease presents with an incidental finding of a 8 cm splenic mass seen since 2022 with some enlargement since her last MRI.   I have reviewed her scans with radiology and the most likely diagnosis is a benign lesion called AMANDA but it is still difficult to rule out a malignancy 100% by imaging. We discussed that her PET/CT did not show avidity in her splenic lesion. I presented her with 3 options including all the risks and benefits for surgical resection, biopsy, or surveillance. The patient is very hesitant and anxious to undergo surgery, therefore she prefers to start with a biopsy of this lesion first. We again discussed that even with a biopsy we may not be able to rule out malignancy and the only definitive way would be to perform a splenectomy and confirm with pathology as gold standard. I will refer her to Dr. Bee at  for this biopsy. His office will contact and schedule the patient.   She asked many good questions that I answered to the best of my ability.   Sincerely,  Aundrea Marte MD Division of Surgical Oncology Director, [facility] Hepatic Artery Infusion Pump , [facility] in Surgical Oncology

## 2023-08-08 NOTE — HISTORY OF PRESENT ILLNESS
[de-identified] : This telephonic visit was provided via real-time 2 way audio visual technology. The patient, LINSEY HURTADO, was located at home, [address] [place] [address] , at the time of the visit. \par The provider, was located at at the clinic at [address] [facility], [place], [place] at the time of the visit. The patient, LINSEY HURTADO  and provider participated in the telehealth encounter. \par Verbal consent was given on 07/13/2023  by the patient.\par \par \par Linesy Hurtado is a 52 yr old female, presenting today for a follow up Surgical Oncology consultation regarding a splenic mass. \par \par She was diagnosed at age 32 with polycystic kidney disease and has been monitoring her kidney and liver for cysts ever since with yearly ultrasounds. \par \par She underwent a CT scan in 2003 for abdominal pain at [facility] which revealed multiple bilateral renal and hepatic cysts.\par \par An US performed on 6/6/2022 revealed hepatomegaly and innumerable hepatic and renal cysts in addition to a splenic lesion measuring 6.2 x 6.7 x 6.3 cm, not seen in prior study (7/28/2020). \par \par She subsequently underwent an MRI on 6/15/2022 which showed borderline in size spleen (13cm), mass involving the posterior aspect of the spleen measuring 7 x 6 cm, which does not show typical enhancement features of hemangioma. A repeat MRI performed on 6/1/23 revealed a slight increase in size of the splenic lesion measuring  8.0 x 7.5 cm, previously measured 7.2 x 7.3 cm in size. \par \par Her case was presented at [facility] and the consensus was to perform a splenectomy vs PET and biopsy, with the understanding that the gold standard to rule out malignancy would be final pathology. On 7/9/23 she underwent a PET/CT which showed no FDG avidity in the splenic lesion. \par \par Today she presents for follow up and notes that she is feeling well, and expresses mild discomfort in her abdomen where the 'cysts' are.  She is anxious about the splenic lesion and potential surgery.

## 2023-08-14 ENCOUNTER — NON-APPOINTMENT (OUTPATIENT)
Age: 53
End: 2023-08-14

## 2023-08-23 NOTE — ED PROVIDER NOTE - GASTROINTESTINAL, MLM
Orthopedic consult to consider RTHR     Pain clinic for left   
Worsening   Trial with Zetia given muscle pains with statin     Cholesterol (mg/dL)   Date Value   08/18/2023 242 (H)     HDL (mg/dL)   Date Value   08/18/2023 72     Cholesterol/ HDL Ratio (no units)   Date Value   08/18/2023 3.4     Triglycerides (mg/dL)   Date Value   08/18/2023 111     LDL (mg/dL)   Date Value   08/18/2023 148 (H)       
Abdomen soft, non-tender, no guarding.

## 2023-09-21 NOTE — ED ADULT NURSE NOTE - SUICIDE SCREENING QUESTION 1
September 21, 2023        Nahomi Nolasco MD  [address]  [facility] [address]             [facility] Cardiovasc Surg 2nd Fl  [address]  [facility] [address]  Phone: [phone]   Patient: Ross Rodriguez   MR Number: [id number]   YOB: 1942   Date of Visit: 9/21/2023       Dear Dr. Nolasco:    Thank you for referring Ross Rodriguez to me for evaluation. Below are the relevant portions of my assessment and plan of care.            If you have questions, please do not hesitate to call me. I look forward to following Ross along with you.    Sincerely,      Armando Mcdaniel MD           CC  No Recipients        No

## 2023-10-11 ENCOUNTER — TRANSCRIPTION ENCOUNTER (OUTPATIENT)
Age: 53
End: 2023-10-11

## 2023-10-11 ENCOUNTER — APPOINTMENT (OUTPATIENT)
Dept: INTERVENTIONAL RADIOLOGY/VASCULAR | Facility: CLINIC | Age: 53
End: 2023-10-11
Payer: COMMERCIAL

## 2023-10-11 VITALS — BODY MASS INDEX: 31.64 KG/M2 | HEIGHT: 71 IN | WEIGHT: 226 LBS

## 2023-10-11 PROCEDURE — 99203 OFFICE O/P NEW LOW 30 MIN: CPT | Mod: 95

## 2023-10-11 RX ORDER — METHOCARBAMOL 500 MG/1
TABLET, FILM COATED ORAL
Refills: 0 | Status: COMPLETED | COMMUNITY
End: 2023-10-11

## 2023-11-02 ENCOUNTER — APPOINTMENT (OUTPATIENT)
Dept: SURGICAL ONCOLOGY | Facility: CLINIC | Age: 53
End: 2023-11-02
Payer: COMMERCIAL

## 2023-11-24 ENCOUNTER — APPOINTMENT (OUTPATIENT)
Dept: MRI IMAGING | Facility: HOSPITAL | Age: 53
End: 2023-11-24
Payer: COMMERCIAL

## 2023-11-24 ENCOUNTER — OUTPATIENT (OUTPATIENT)
Dept: OUTPATIENT SERVICES | Facility: HOSPITAL | Age: 53
LOS: 1 days | End: 2023-11-24
Payer: COMMERCIAL

## 2023-11-24 DIAGNOSIS — D73.89 OTHER DISEASES OF SPLEEN: ICD-10-CM

## 2023-11-24 PROCEDURE — 74183 MRI ABD W/O CNTR FLWD CNTR: CPT

## 2023-11-24 PROCEDURE — A9579: CPT

## 2023-11-24 PROCEDURE — 74183 MRI ABD W/O CNTR FLWD CNTR: CPT | Mod: 26

## 2023-11-29 ENCOUNTER — APPOINTMENT (OUTPATIENT)
Dept: SURGICAL ONCOLOGY | Facility: CLINIC | Age: 53
End: 2023-11-29
Payer: COMMERCIAL

## 2023-11-29 VITALS
HEART RATE: 87 BPM | WEIGHT: 230 LBS | DIASTOLIC BLOOD PRESSURE: 82 MMHG | HEIGHT: 71 IN | SYSTOLIC BLOOD PRESSURE: 132 MMHG | OXYGEN SATURATION: 98 % | BODY MASS INDEX: 32.2 KG/M2 | RESPIRATION RATE: 17 BRPM

## 2023-11-29 DIAGNOSIS — D73.89 OTHER DISEASES OF SPLEEN: ICD-10-CM

## 2023-11-29 DIAGNOSIS — Z01.818 ENCOUNTER FOR OTHER PREPROCEDURAL EXAMINATION: ICD-10-CM

## 2023-11-29 PROCEDURE — 99214 OFFICE O/P EST MOD 30 MIN: CPT

## 2023-11-30 PROBLEM — Z01.818 PREOPERATIVE CLEARANCE: Status: ACTIVE | Noted: 2023-11-30

## 2023-12-26 NOTE — ASSESSMENT
[FreeTextEntry1] : 53 y/o female with a history of polycystic kidney disease presents with an incidental finding of a 8 cm splenic mass seen since 2022 with some enlargement since her last MRI.  I have reviewed her scans with radiology and the most likely diagnosis is a benign lesion called AMANDA but it is still difficult to rule out a malignancy 100% by imaging. Given the increase in size of the splenic lesion, she has decided to proceed with a resection for a definitive diagnosis and to alleviate any further worry with subsequent imaging. We also reviewed her continued symptoms of pressure and mild pain due to her liver cyst and she would like to address these during the same surgery.   We therefore discussed the details of the operation including a splenectomy and liver cyst fenestration. We will attempt with a robotic approach.  The advantages, disadvantages, of the option and their associated risks, benefits, complications including but not limited to infection, bleeding, bile leak, need for further surgery, myocardial infarction, stroke, arrhythmia, pneumonia, deep vein thrombosis, pulmonary embolism, need for ventilatory support, and death were discussed, patient verbalized understanding and provided verbal informed consent. We will schedule her for a robotic splenectomy and possible hepatic cyst fenestration. She will need an echo, medical clearance, and PSTs.   She asked many good questions that I answered to the best of my ability.  Sincerely,  Aundrea Marte MD Division of Surgical Oncology Director, [facility]Disciplinary [facility] & Hepatic Artery Infusion Pump , Translational Research in Surgical Oncology.

## 2023-12-26 NOTE — RESULTS/DATA
[FreeTextEntry1] : CT scan in 2003: multiple bilateral renal and hepatic cysts.   US of the abdomen on 7/28/20 revealed liver (18.9 cm) and left kidney appear enlarged, numerous cysts in the liver largest 6.4 cm and b/l kidneys. Small amount of gallbladder sludge present.   US on 6/6/2022 revealed hepatomegaly and innumerable hepatic and renal cysts. Splenic lesion measuring 6.2 x 6.7 x 6.3 cm, no internal vascularity is noted, not seen in prior study (7/28/2020). CT/MRI recommended.   MRI on 6/15/2022 this showed borderline in size spleen (13cm), mass involving the posterior aspect of the spleen measuring 7 x 6 cm.  This is essentially isointense to the spleen on the T2 weighted images, and is isointense on the precontrast T1 weighted images. This does not contain internal fat. This shows mildly heterogeneous enhancement on the early postcontrast images. The appearance of a thin rim may be adjacent compressed parenchyma. The mass becomes isodense to the spleen on the later postcontrast images. There are innumerable b/l renal cysts and hepatic cysts compatible with polycystic kidney disease. FNA/biopsy recommended.   US on 8/23/22 to asses for growth/stability of the splenic lesion showed an enlarged spleen (14.2) and a stable splenic lesion.   US 2/6/23 Spleen is upper limits of normal (12.3 cm). Hyperechoic lesion within the spleen measuring 7 x 5.4 x 5.8 cm, previously measured 7.4 x 5.5 x 6.6 cm.   MRI 6/1/23 8.0 x 7.5 x 7.1 cm enhancing not characterize splenic lesion which appears solid in nature.  Findings consistent with polycystic kidneys and liver. At that time the patient's splenic lesion measured approximate 7.2 x 7.3 cm in size. Today measuring 8.0 x 7.5 cm measured the same location Difficult to measure on the coronal images from the outside examination. Is better delineated on today's exam which may be technical in nature.  7/9/23 PET/CT 1. Mild splenomegaly with no distinct focal abnormal activity that is concerning for FDG avid malignant process. Distribution and intensity of activity throughout the spleen is relatively uniform. The enhancing lesion on MRI is not delineated on this examination. 2. Nonavid hypodensities in the liver and kidneys compatible with known multicystic lesions. 3. Elongated activity within the endometrial canal and nonavid bilateral ovarian hypodensities may represent physiologic changes in this patient. Please correlate with menstrual history.  MRI 11/24/23 1.  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 2.  Polycystic kidney and liver disease.  Labs:  7/2022 WBC 10.18 RBC 4.72 HGB 11.5 Cr 0.69 BUN 12 TBili 0.3 ALK PHOS 90 AST 14 ALT 11, PT 13.6, INR 1.17 10/9/23 plt 179 tbili 0.4 alk 104 AST 19 ALT 13

## 2023-12-26 NOTE — PHYSICAL EXAM
[FreeTextEntry1] : General - pleasant well developed  Head/Face - Atraumatic Eyes - PERRL, no sclera icterus ENMT - MMM, normal nose/ears, no oropharyngeal lesion Neck - supple, no thyromegaly, no masses    Lymphatics - no palpable adenopathy CV - Regular Rate and Rhythm, no rub or gallop Pulm - CTAB Abd - soft, non-tender, nondistended.  Ext - no c/c/e, normal ROM in each extremity  Back/spine - no masses/tenderness Skin - no skin lesion on sun exposed skin, no jaundice Neuro - alert and oriented x 3 Psych - normal mood and affect

## 2024-01-02 ENCOUNTER — NON-APPOINTMENT (OUTPATIENT)
Age: 54
End: 2024-01-02

## 2024-01-07 ENCOUNTER — EMERGENCY (EMERGENCY)
Facility: HOSPITAL | Age: 54
LOS: 1 days | Discharge: HOME | End: 2024-01-07
Attending: INTERNAL MEDICINE | Admitting: INTERNAL MEDICINE
Payer: COMMERCIAL

## 2024-01-07 VITALS
RESPIRATION RATE: 18 BRPM | HEART RATE: 89 BPM | SYSTOLIC BLOOD PRESSURE: 144 MMHG | TEMPERATURE: 98 F | HEIGHT: 71 IN | DIASTOLIC BLOOD PRESSURE: 90 MMHG | OXYGEN SATURATION: 96 % | WEIGHT: 235.01 LBS

## 2024-01-07 VITALS
OXYGEN SATURATION: 97 % | RESPIRATION RATE: 16 BRPM | SYSTOLIC BLOOD PRESSURE: 153 MMHG | TEMPERATURE: 99 F | DIASTOLIC BLOOD PRESSURE: 67 MMHG | HEART RATE: 81 BPM

## 2024-01-07 LAB
FLUAV AG NPH QL: SIGNIFICANT CHANGE UP
FLUAV AG NPH QL: SIGNIFICANT CHANGE UP
FLUBV AG NPH QL: SIGNIFICANT CHANGE UP
FLUBV AG NPH QL: SIGNIFICANT CHANGE UP
HCT VFR BLD CALC: 39 % — SIGNIFICANT CHANGE UP (ref 34.5–45)
HCT VFR BLD CALC: 39 % — SIGNIFICANT CHANGE UP (ref 34.5–45)
HGB BLD-MCNC: 12.3 G/DL — SIGNIFICANT CHANGE UP (ref 11.5–15.5)
HGB BLD-MCNC: 12.3 G/DL — SIGNIFICANT CHANGE UP (ref 11.5–15.5)
MCHC RBC-ENTMCNC: 25 PG — LOW (ref 27–34)
MCHC RBC-ENTMCNC: 25 PG — LOW (ref 27–34)
MCHC RBC-ENTMCNC: 31.5 GM/DL — LOW (ref 32–36)
MCHC RBC-ENTMCNC: 31.5 GM/DL — LOW (ref 32–36)
MCV RBC AUTO: 79.3 FL — LOW (ref 80–100)
MCV RBC AUTO: 79.3 FL — LOW (ref 80–100)
NRBC # BLD: 0 /100 WBCS — SIGNIFICANT CHANGE UP (ref 0–0)
NRBC # BLD: 0 /100 WBCS — SIGNIFICANT CHANGE UP (ref 0–0)
PLATELET # BLD AUTO: 132 K/UL — LOW (ref 150–400)
PLATELET # BLD AUTO: 132 K/UL — LOW (ref 150–400)
RBC # BLD: 4.92 M/UL — SIGNIFICANT CHANGE UP (ref 3.8–5.2)
RBC # BLD: 4.92 M/UL — SIGNIFICANT CHANGE UP (ref 3.8–5.2)
RBC # FLD: 15.6 % — HIGH (ref 10.3–14.5)
RBC # FLD: 15.6 % — HIGH (ref 10.3–14.5)
RSV RNA NPH QL NAA+NON-PROBE: SIGNIFICANT CHANGE UP
RSV RNA NPH QL NAA+NON-PROBE: SIGNIFICANT CHANGE UP
SARS-COV-2 RNA SPEC QL NAA+PROBE: SIGNIFICANT CHANGE UP
SARS-COV-2 RNA SPEC QL NAA+PROBE: SIGNIFICANT CHANGE UP
WBC # BLD: 4.29 K/UL — SIGNIFICANT CHANGE UP (ref 3.8–10.5)
WBC # BLD: 4.29 K/UL — SIGNIFICANT CHANGE UP (ref 3.8–10.5)
WBC # FLD AUTO: 4.29 K/UL — SIGNIFICANT CHANGE UP (ref 3.8–10.5)
WBC # FLD AUTO: 4.29 K/UL — SIGNIFICANT CHANGE UP (ref 3.8–10.5)

## 2024-01-07 PROCEDURE — 87637 SARSCOV2&INF A&B&RSV AMP PRB: CPT

## 2024-01-07 PROCEDURE — 36415 COLL VENOUS BLD VENIPUNCTURE: CPT

## 2024-01-07 PROCEDURE — 85027 COMPLETE CBC AUTOMATED: CPT

## 2024-01-07 PROCEDURE — 94640 AIRWAY INHALATION TREATMENT: CPT

## 2024-01-07 PROCEDURE — 99283 EMERGENCY DEPT VISIT LOW MDM: CPT | Mod: 25

## 2024-01-07 PROCEDURE — 71045 X-RAY EXAM CHEST 1 VIEW: CPT | Mod: 26

## 2024-01-07 PROCEDURE — 99284 EMERGENCY DEPT VISIT MOD MDM: CPT

## 2024-01-07 PROCEDURE — 71045 X-RAY EXAM CHEST 1 VIEW: CPT

## 2024-01-07 RX ORDER — ALBUTEROL 90 UG/1
2 AEROSOL, METERED ORAL
Qty: 1 | Refills: 0
Start: 2024-01-07 | End: 2024-02-05

## 2024-01-07 RX ORDER — IPRATROPIUM/ALBUTEROL SULFATE 18-103MCG
3 AEROSOL WITH ADAPTER (GRAM) INHALATION
Refills: 0 | Status: DISCONTINUED | OUTPATIENT
Start: 2024-01-07 | End: 2024-01-11

## 2024-01-07 RX ORDER — ALBUTEROL 90 UG/1
2 AEROSOL, METERED ORAL ONCE
Refills: 0 | Status: DISCONTINUED | OUTPATIENT
Start: 2024-01-07 | End: 2024-01-07

## 2024-01-07 RX ADMIN — Medication 100 MILLIGRAM(S): at 15:46

## 2024-01-07 RX ADMIN — Medication 3 MILLILITER(S): at 15:46

## 2024-01-07 NOTE — ED PROVIDER NOTE - OBJECTIVE STATEMENT
53-year-old female came to the emergency chief complaint of fever cough phlegm wheezing body aches patient has a history of asthma as a child

## 2024-01-07 NOTE — ED PROVIDER NOTE - CLINICAL SUMMARY MEDICAL DECISION MAKING FREE TEXT BOX
53-year-old female came to the emergency chief complaint of fever cough phlegm wheezing body aches patient has a history of asthma as a child  Chest x-ray is consistent with right-sided pneumonia COVID and flu negative plan to discharge the patient doxycycline and albuterol

## 2024-01-07 NOTE — ED PROVIDER NOTE - DISPOSITION TYPE
Pt needs enrolling in the HTN and Diabetes programs per Dr. Terrell  
She will be eligible for hypertension program but not diabetes program at this time as she is not noted to be diabetic.  Will enroll in the digital hypertension only  
DISCHARGE

## 2024-01-07 NOTE — ED PROVIDER NOTE - PATIENT PORTAL LINK FT
You can access the FollowMyHealth Patient Portal offered by [facility] by registering at the following website: [email or web address]. By joining [facility]’s FollowMyHealth portal, you will also be able to view your health information using other applications (apps) compatible with our system. You can access the FollowMyHealth Patient Portal offered by [facility] by registering at the following website: [email or web address]. By joining [facility]’s FollowMyHealth portal, you will also be able to view your health information using other applications (apps) compatible with our system. You can access the FollowMyHealth Patient Portal offered by [facility] by registering at the following website: [email or web address]. By joining [facility]’s FollowMyHealth portal, you will also be able to view your health information using other applications (apps) compatible with our system. You can access the FollowMyHealth Patient Portal offered by [facility] by registering at the following website: [email or web address]. By joining [facility]’s FollowMyHealth portal, you will also be able to view your health information using other applications (apps) compatible with our system.

## 2024-01-07 NOTE — ED PROVIDER NOTE - ED STEMI HIDDEN
Onset: 2 days ago  Location/description:   Patient stated she \"had a mosquito bite on butt\"  Vaginal discharge / white  Vulvar skin irritation / skin is very red / 1 skin break noted  Associated Symptoms: none  What improves/worsens symptoms:   Baking soda bath last 2 nights  Symptom specific medications:   No med's given for fever  Input  normal  Output: normal void/ pain with wiping  Activity level: normal  Temperature (route and time):   T 101.0 last night/ resolved today  Weight:   Wt Readings from Last 1 Encounters:   10/16/19 25.1 kg (66 %, Z= 0.41)*     * Growth percentiles are based on CDC (Girls, 2-20 Years) data.        Recent Care: 10/16/19 office visit    PLAN:  Mom Gifty will also follow up with clinic tomorrow for any further care instructions or need for appointment.      Caller Mom Gifty is agreeable  to follow recommendations.    Reason for Disposition  • [1] Onset while taking an antibiotic and [2] genital area is itchy    Protocols used: VAGINAL SYMPTOMS OR DISCHARGE - BEFORE [id number]      
Regarding: possible yeast infection  ----- Message from José Manuel Pham sent at 11/11/2019  8:38 PM CST -----  Patient Name: Harmeet Torres  Specialist or PCP Full Name:Dr Wei   Pregnant (If Yes, how long?):no  Symptoms:possible yeast infection  Call Back #:[phone]  Is the patient’s permanent residence located in [place], [place], or a [facility]? Yes [facility] [address]  Call Center Account #: [phone]        
hide

## 2024-01-07 NOTE — ED PROVIDER NOTE - COVID-19 ORDERING FACILITY
[facility]  - [facility]  - [facility]

## 2024-01-07 NOTE — ED PROVIDER NOTE - NSFOLLOWUPINSTRUCTIONS_ED_ALL_ED_FT
Follow up with your PMD within 1-2 days.  Rest, increase your fluids, advance your activity as tolerated.   Take all of your other medications as previously prescribed.   Worsening, continued or ANY new concerning symptoms return to the emergency department.  Doxycycline 100 mg twice daily albuterol 2 puffs every 6 hours as needed forCough

## 2024-01-07 NOTE — ED PROVIDER NOTE - NSICDXPASTMEDICALHX_GEN_ALL_CORE_FT
PAST MEDICAL HISTORY:  Anxiety     Anxiety     Depression     Mitral valve replaced     Polycystic kidney     Polycystic kidney disease

## 2024-01-08 NOTE — ED ADULT NURSE NOTE - COVID-19 ORDERING FACILITY
[facility]  - [facility]  - [facility]

## 2024-01-08 NOTE — ED ADULT NURSE NOTE - NSFALLUNIVINTERV_ED_ALL_ED
Bed/Stretcher in lowest position, wheels locked, appropriate side rails in place/Call bell, personal items and telephone in reach/Instruct patient to call for assistance before getting out of bed/chair/stretcher/Non-slip footwear applied when patient is off stretcher/[place] to call system/Physically safe environment - no spills, clutter or unnecessary equipment/Purposeful proactive rounding/Room/bathroom lighting operational, light cord in reach Bed/Stretcher in lowest position, wheels locked, appropriate side rails in place/Call bell, personal items and telephone in reach/Instruct patient to call for assistance before getting out of bed/chair/stretcher/Non-slip footwear applied when patient is off stretcher/[place] to call system/Physically safe environment - no spills, clutter or unnecessary equipment/Purposeful proactive rounding/Room/bathroom lighting operational, light cord in reach

## 2024-02-10 ENCOUNTER — APPOINTMENT (OUTPATIENT)
Dept: RADIOLOGY | Facility: HOSPITAL | Age: 54
End: 2024-02-10
Payer: COMMERCIAL

## 2024-02-10 ENCOUNTER — OUTPATIENT (OUTPATIENT)
Dept: OUTPATIENT SERVICES | Facility: HOSPITAL | Age: 54
LOS: 1 days | End: 2024-02-10
Payer: COMMERCIAL

## 2024-02-10 DIAGNOSIS — D73.89 OTHER DISEASES OF SPLEEN: ICD-10-CM

## 2024-02-10 PROCEDURE — 71046 X-RAY EXAM CHEST 2 VIEWS: CPT

## 2024-02-10 PROCEDURE — 71046 X-RAY EXAM CHEST 2 VIEWS: CPT | Mod: 26

## 2024-02-15 ENCOUNTER — APPOINTMENT (OUTPATIENT)
Dept: SURGICAL ONCOLOGY | Facility: CLINIC | Age: 54
End: 2024-02-15
Payer: COMMERCIAL

## 2024-02-15 VITALS
OXYGEN SATURATION: 97 % | HEART RATE: 69 BPM | HEIGHT: 71 IN | DIASTOLIC BLOOD PRESSURE: 80 MMHG | SYSTOLIC BLOOD PRESSURE: 140 MMHG | BODY MASS INDEX: 32.2 KG/M2 | WEIGHT: 230 LBS

## 2024-02-15 PROCEDURE — 99214 OFFICE O/P EST MOD 30 MIN: CPT

## 2024-02-20 ENCOUNTER — APPOINTMENT (OUTPATIENT)
Dept: CARDIOLOGY | Facility: CLINIC | Age: 54
End: 2024-02-20

## 2024-02-21 ENCOUNTER — RESULT REVIEW (OUTPATIENT)
Age: 54
End: 2024-02-21

## 2024-02-21 ENCOUNTER — APPOINTMENT (OUTPATIENT)
Dept: CV DIAGNOSITCS | Facility: HOSPITAL | Age: 54
End: 2024-02-21

## 2024-02-21 ENCOUNTER — OUTPATIENT (OUTPATIENT)
Dept: OUTPATIENT SERVICES | Facility: HOSPITAL | Age: 54
LOS: 1 days | End: 2024-02-21
Payer: COMMERCIAL

## 2024-02-21 DIAGNOSIS — Z01.818 ENCOUNTER FOR OTHER PREPROCEDURAL EXAMINATION: ICD-10-CM

## 2024-02-21 PROCEDURE — 93306 TTE W/DOPPLER COMPLETE: CPT | Mod: 26

## 2024-02-22 ENCOUNTER — APPOINTMENT (OUTPATIENT)
Dept: CT IMAGING | Facility: HOSPITAL | Age: 54
End: 2024-02-22

## 2024-02-22 NOTE — REVIEW OF SYSTEMS
[Negative] : Psychiatric [FreeTextEntry8] : abdominal discomfort, palpable liver cysts, LUQ pressure

## 2024-02-22 NOTE — HISTORY OF PRESENT ILLNESS
[de-identified] : Linsey Hurtado is a 52 yr old female, presenting today for a follow up Surgical Oncology consultation regarding a splenic mass.   She was diagnosed at age 32 with polycystic kidney disease and has been monitoring her kidney and liver for cysts ever since with yearly ultrasounds.   She underwent a CT scan in 2003 for abdominal pain at [facility] which revealed multiple bilateral renal and hepatic cysts.  An US performed on 6/6/2022 revealed hepatomegaly and innumerable hepatic and renal cysts in addition to a splenic lesion measuring 6.2 x 6.7 x 6.3 cm, not seen in prior study (7/28/2020).   She subsequently underwent an MRI on 6/15/2022 which showed borderline in size spleen (13cm), mass involving the posterior aspect of the spleen measuring 7 x 6 cm, which does not show typical enhancement features of hemangioma. A repeat MRI performed on 6/1/23 revealed a slight increase in size of the splenic lesion measuring  8.0 x 7.5 cm, previously measured 7.2 x 7.3 cm in size.   Her case was presented at [facility] and the consensus was to perform a splenectomy vs PET and biopsy, with the understanding that the gold standard to rule out malignancy would be final pathology. On 7/9/23 she underwent a PET/CT which showed no FDG avidity in the splenic lesion.   She saw Dr Bee for a biopsy of the splenic lesion on 10/11/23 but has decided against biopsy due to potential risks. 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   Today she presents for a presurgical consultation.  She is recovering well from having COVID. He Chest x-ray 2/10/24 w/ normal findings.

## 2024-02-22 NOTE — ASSESSMENT
[FreeTextEntry1] : 51 y/o female with a history of polycystic kidney disease presents with an incidental finding of an 8 cm splenic mass seen since 2022 with some enlargement since her last MRI 11/2023.  We discussed the details of the operation including a splenectomy and liver cyst fenestration. We will use a robotic approach. The advantages, disadvantages, of the option and their associated risks, benefits, complications including but not limited to infection, bile leak, bleeding, need for further surgery, myocardial infarction, stroke, arrhythmia, pneumonia, deep vein thrombosis, pulmonary embolism, need for ventilatory support, and death were discussed, she verbalized understanding and provided verbal informed consent. She will need an echo, medical clearance, PSTs. and CAP CT. She wishes to move forward with the operation. Our office will call to schedule surgery.  She asked many good questions that I answered to the best of my ability.  Sincerely, Aundrea Marte MD Division of Surgical Oncology Director, Liver Multi-Disciplinary Clinic & Hepatic Artery Infusion Pump , Translational Research in Surgical Oncology.

## 2024-02-22 NOTE — RESULTS/DATA
[FreeTextEntry1] : CT scan in 2003: multiple bilateral renal and hepatic cysts.   US of the abdomen on 7/28/20 revealed liver (18.9 cm) and left kidney appear enlarged, numerous cysts in the liver largest 6.4 cm and b/l kidneys. Small amount of gallbladder sludge present.   US on 6/6/2022 revealed hepatomegaly and innumerable hepatic and renal cysts. Splenic lesion measuring 6.2 x 6.7 x 6.3 cm, no internal vascularity is noted, not seen in prior study (7/28/2020). CT/MRI recommended.   MRI on 6/15/2022 this showed borderline in size spleen (13cm), mass involving the posterior aspect of the spleen measuring 7 x 6 cm.  This is essentially isointense to the spleen on the T2 weighted images, and is isointense on the precontrast T1 weighted images. This does not contain internal fat. This shows mildly heterogeneous enhancement on the early postcontrast images. The appearance of a thin rim may be adjacent compressed parenchyma. The mass becomes isodense to the spleen on the later postcontrast images. There are innumerable b/l renal cysts and hepatic cysts compatible with polycystic kidney disease. FNA/biopsy recommended.   US on 8/23/22 to asses for growth/stability of the splenic lesion showed an enlarged spleen (14.2) and a stable splenic lesion.   US 2/6/23 Spleen is upper limits of normal (12.3 cm). Hyperechoic lesion within the spleen measuring 7 x 5.4 x 5.8 cm, previously measured 7.4 x 5.5 x 6.6 cm.   MRI 6/1/23 8.0 x 7.5 x 7.1 cm enhancing not characterize splenic lesion which appears solid in nature.  Findings consistent with polycystic kidneys and liver. At that time the patient's splenic lesion measured approximate 7.2 x 7.3 cm in size. Today measuring 8.0 x 7.5 cm measured the same location Difficult to measure on the coronal images from the outside examination. Is better delineated on today's exam which may be technical in nature.  7/9/23 PET/CT 1. Mild splenomegaly with no distinct focal abnormal activity that is concerning for FDG avid malignant process. Distribution and intensity of activity throughout the spleen is relatively uniform. The enhancing lesion on MRI is not delineated on this examination. 2. Nonavid hypodensities in the liver and kidneys compatible with known multicystic lesions. 3. Elongated activity within the endometrial canal and nonavid bilateral ovarian hypodensities may represent physiologic changes in this patient. Please correlate with menstrual history.  MRI 11/24/23 1.  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 2.  Polycystic kidney and liver disease.  Chest x-ray 2/10/24 showed resolved opacity right mid to upper lung field. There is no focal consolidation, pleural effusion or pneumothorax. The cardiomediastinal silhouette is within normal limits. Osseous structures are within normal limit.  Labs:  7/2022 WBC 10.18 RBC 4.72 HGB 11.5 Cr 0.69 BUN 12 TBili 0.3 ALK PHOS 90 AST 14 ALT 11, PT 13.6, INR 1.17 10/9/23 plt 179 tbili 0.4 alk 104 AST 19 ALT 13

## 2024-02-23 ENCOUNTER — OUTPATIENT (OUTPATIENT)
Dept: OUTPATIENT SERVICES | Facility: HOSPITAL | Age: 54
LOS: 1 days | End: 2024-02-23
Payer: COMMERCIAL

## 2024-02-23 ENCOUNTER — APPOINTMENT (OUTPATIENT)
Dept: CT IMAGING | Facility: HOSPITAL | Age: 54
End: 2024-02-23
Payer: COMMERCIAL

## 2024-02-23 VITALS
WEIGHT: 229.94 LBS | HEIGHT: 70.5 IN | DIASTOLIC BLOOD PRESSURE: 70 MMHG | TEMPERATURE: 98 F | SYSTOLIC BLOOD PRESSURE: 140 MMHG | OXYGEN SATURATION: 98 % | RESPIRATION RATE: 16 BRPM | HEART RATE: 60 BPM

## 2024-02-23 DIAGNOSIS — Z98.890 OTHER SPECIFIED POSTPROCEDURAL STATES: Chronic | ICD-10-CM

## 2024-02-23 DIAGNOSIS — D73.89 OTHER DISEASES OF SPLEEN: ICD-10-CM

## 2024-02-23 DIAGNOSIS — I10 ESSENTIAL (PRIMARY) HYPERTENSION: ICD-10-CM

## 2024-02-23 DIAGNOSIS — D73.4 CYST OF SPLEEN: ICD-10-CM

## 2024-02-23 LAB
APTT BLD: 37.4 SEC — HIGH (ref 24.5–35.6)
INR BLD: 1.09 RATIO — SIGNIFICANT CHANGE UP (ref 0.85–1.18)
PROTHROM AB SERPL-ACNC: 12.3 SEC — SIGNIFICANT CHANGE UP (ref 9.5–13)
RH IG SCN BLD-IMP: POSITIVE — SIGNIFICANT CHANGE UP

## 2024-02-23 PROCEDURE — 93010 ELECTROCARDIOGRAM REPORT: CPT

## 2024-02-23 PROCEDURE — 74177 CT ABD & PELVIS W/CONTRAST: CPT | Mod: 26

## 2024-02-23 PROCEDURE — 71260 CT THORAX DX C+: CPT | Mod: 26

## 2024-02-23 PROCEDURE — 74177 CT ABD & PELVIS W/CONTRAST: CPT

## 2024-02-23 PROCEDURE — 71260 CT THORAX DX C+: CPT

## 2024-02-23 RX ORDER — SERTRALINE 25 MG/1
1 TABLET, FILM COATED ORAL
Qty: 0 | Refills: 0 | DISCHARGE

## 2024-02-23 NOTE — H&P PST ADULT - GASTROINTESTINAL COMMENTS
Hx polycystic disease with cyst of spleen that has increased in size and now for removal - hx of liver and kidney cysts that have also been monitored

## 2024-02-23 NOTE — H&P PST ADULT - PROBLEM SELECTOR PLAN 1
Pt scheduled for surgery and preop instructions including instructions for taking Famotidine and for Chlorhexidine use in showering on the day of surgery, given verbally and with use of  written materials, and patient confirming understanding of such instructions using  teach back method.  Echo report in chart with EKG  Pt had BW done at [facility] through PCP - CBC and CMP requested - Dr Maddox [phone] -reviewed through pt portal to confirm

## 2024-02-23 NOTE — H&P PST ADULT - NSICDXPASTMEDICALHX_GEN_ALL_CORE_FT
PAST MEDICAL HISTORY:  Anxiety     Anxiety     Cyst of spleen     Depression     Polycystic kidney     Polycystic kidney disease      PAST MEDICAL HISTORY:  Anxiety     Anxiety     Cyst of spleen     Depression     Obesity     Polycystic kidney     Polycystic kidney disease     Sleep apnea

## 2024-02-23 NOTE — H&P PST ADULT - FUNCTIONAL STATUS
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

## 2024-02-23 NOTE — H&P PST ADULT - GIT GEN HX ROS MEA POS PC
occasional discomfort over NAVEED/abdominal pain occasional discomfort over upper left abdomen/abdominal pain

## 2024-02-23 NOTE — H&P PST ADULT - HISTORY OF PRESENT ILLNESS
Pt is a 53 yr old female scheduled for Robotic Splenectomy Poss Hepatic Cyst Fenestration with dr Marte tentatively 2/28/24 - pt hx polycystic disease for 20 yrs with regular f/u for liver/kidney and spleen cysts - recently spleen cyst has increased in size and decision to remove made. 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

## 2024-02-23 NOTE — H&P PST ADULT - NEGATIVE CARDIOVASCULAR SYMPTOMS
no chest pain/no palpitations/no dyspnea on exertion/no peripheral edema no chest pain/no dyspnea on exertion/no peripheral edema

## 2024-02-23 NOTE — H&P PST ADULT - CARDIOVASCULAR SYMPTOMS
pt with severe anxiety c/o of occasional panic attacks with palpitations - echo done by Surgeon yesterday/palpitations

## 2024-02-23 NOTE — H&P PST ADULT - HEMATOLOGY/LYMPHATICS COMMENTS
Hx polycystic disease with regular evaluation of cysts noted several yrs ago in liver, kidney and spleen - spleen has now increased in size so decision to remove -

## 2024-02-23 NOTE — H&P PST ADULT - RESPIRATORY AND THORAX COMMENTS
Pt hx pneumonia 1/24 - treated with abx - recent CXR clear now Pt hx atypical  pneumonia 1/24 - treated with abx at home - recent CXR clear now and pt denies SOB or cough

## 2024-02-28 ENCOUNTER — APPOINTMENT (OUTPATIENT)
Dept: SURGICAL ONCOLOGY | Facility: HOSPITAL | Age: 54
End: 2024-02-28

## 2024-03-04 PROBLEM — D73.4 CYST OF SPLEEN: Chronic | Status: ACTIVE | Noted: 2024-02-23

## 2024-03-04 PROBLEM — G47.30 SLEEP APNEA, UNSPECIFIED: Chronic | Status: ACTIVE | Noted: 2024-02-23

## 2024-03-04 PROBLEM — E66.9 OBESITY, UNSPECIFIED: Chronic | Status: ACTIVE | Noted: 2024-02-23

## 2024-03-07 ENCOUNTER — APPOINTMENT (OUTPATIENT)
Dept: OBGYN | Facility: CLINIC | Age: 54
End: 2024-03-07
Payer: COMMERCIAL

## 2024-03-07 VITALS
BODY MASS INDEX: 32.2 KG/M2 | HEIGHT: 71 IN | DIASTOLIC BLOOD PRESSURE: 90 MMHG | SYSTOLIC BLOOD PRESSURE: 140 MMHG | WEIGHT: 230 LBS | OXYGEN SATURATION: 97 % | HEART RATE: 87 BPM | TEMPERATURE: 97 F

## 2024-03-07 DIAGNOSIS — N60.09 SOLITARY CYST OF UNSPECIFIED BREAST: ICD-10-CM

## 2024-03-07 DIAGNOSIS — Z01.419 ENCOUNTER FOR GYNECOLOGICAL EXAMINATION (GENERAL) (ROUTINE) W/OUT ABNORMAL FINDINGS: ICD-10-CM

## 2024-03-07 DIAGNOSIS — Z87.42 PERSONAL HISTORY OF OTHER DISEASES OF THE FEMALE GENITAL TRACT: ICD-10-CM

## 2024-03-07 DIAGNOSIS — Z12.39 ENCOUNTER FOR OTHER SCREENING FOR MALIGNANT NEOPLASM OF BREAST: ICD-10-CM

## 2024-03-07 LAB
BILIRUB UR QL STRIP: NEGATIVE
CLARITY UR: CLEAR
COLLECTION METHOD: NORMAL
GLUCOSE UR-MCNC: NEGATIVE
HCG UR QL: 0.2 EU/DL
HGB UR QL STRIP.AUTO: NORMAL
KETONES UR-MCNC: NEGATIVE
LEUKOCYTE ESTERASE UR QL STRIP: NEGATIVE
NITRITE UR QL STRIP: NEGATIVE
PH UR STRIP: 6.5
PROT UR STRIP-MCNC: NEGATIVE
SP GR UR STRIP: 1.01

## 2024-03-07 PROCEDURE — 99396 PREV VISIT EST AGE 40-64: CPT

## 2024-03-07 PROCEDURE — 99214 OFFICE O/P EST MOD 30 MIN: CPT | Mod: 25

## 2024-03-07 PROCEDURE — 81003 URINALYSIS AUTO W/O SCOPE: CPT | Mod: QW

## 2024-03-07 NOTE — COUNSELING
[Nutrition/ Exercise/ Weight Management] : nutrition, exercise, weight management [Bladder Hygiene] : bladder hygiene [Vitamins/Supplements] : vitamins/supplements [Medication Management] : medication management [FreeTextEntry2] : Endometrial sampling

## 2024-03-07 NOTE — PHYSICAL EXAM
[Chaperone Present] : A chaperone was present in the examining room during all aspects of the physical examination [Alert] : alert [Appropriately responsive] : appropriately responsive [No Acute Distress] : no acute distress [Clear to Auscultation B/L] : clear to auscultation bilaterally [Soft] : soft [Non-tender] : non-tender [Non-distended] : non-distended [No HSM] : No HSM [No Lesions] : no lesions [No Mass] : no mass [Oriented x3] : oriented x3 [Examination Of The Breasts] : a normal appearance [No Masses] : no breast masses were palpable [Labia Majora] : normal [Labia Minora] : normal [Scant] : There was scant vaginal bleeding [Normal] : normal [Uterine Adnexae] : normal [No Discharge] : no discharge [Old Blood] : old blood was present in the vagina [FreeTextEntry5] : 7 Oclock granulating lesion

## 2024-03-08 LAB
CANDIDA VAG CYTO: NOT DETECTED
G VAGINALIS+PREV SP MTYP VAG QL MICRO: NOT DETECTED
HPV HIGH+LOW RISK DNA PNL CVX: NOT DETECTED
T VAGINALIS VAG QL WET PREP: NOT DETECTED

## 2024-03-11 DIAGNOSIS — N39.0 URINARY TRACT INFECTION, SITE NOT SPECIFIED: ICD-10-CM

## 2024-03-11 LAB
BACTERIA UR CULT: ABNORMAL
HPV 16 E6+E7 MRNA CVX QL NAA+PROBE: NOT DETECTED
HPV18+45 E6+E7 MRNA CVX QL NAA+PROBE: NOT DETECTED

## 2024-03-11 RX ORDER — CLINDAMYCIN HYDROCHLORIDE 300 MG/1
300 CAPSULE ORAL EVERY 6 HOURS
Qty: 20 | Refills: 0 | Status: ACTIVE | COMMUNITY
Start: 2024-03-11 | End: 1900-01-01

## 2024-03-11 RX ORDER — CEPHALEXIN 500 MG/1
500 CAPSULE ORAL 3 TIMES DAILY
Qty: 21 | Refills: 1 | Status: ACTIVE | COMMUNITY
Start: 2024-03-11 | End: 1900-01-01

## 2024-03-14 LAB — CYTOLOGY CVX/VAG DOC THIN PREP: ABNORMAL

## 2024-03-18 NOTE — ED ADULT NURSE NOTE - GASTROINTESTINAL ASSESSMENT
Sharyn Reed is a 62 y.o. female complains of   Chief Complaint   Patient presents with    Diabetes    Hypertension    Cholesterol Problem    Elbow Pain     Right elbow pain       HPI: Here for follow-up.  History of diabetes, hypertension, leg swelling, hyperlipidemia, rash over left ankle/hyperpigmentation.  A1c fairly controlled.  No hypo or hyperglycemic symptoms.  Discussed high BMI.  Importance of diet and lifestyle modification along with weight loss.  She is working on healthy lifestyle.  Will work with more compliance.  Vitals been stable.  Asymptomatic.  Not taking statin at this time.  Will restart with the lower dose.  Tolerating it well in the past.  Discussed diet and lifestyle modification again.  Denies any headache, dizziness, no chest pain or trouble breathing, no arm or leg weakness. No nausea or vomiting, no weight or appetite changes, no mood changes . No urine or bowel complains, no palpitation, no diaphoresis. No abdominal pain. No cold or cough. No leg swelling. No fever. No sleep trouble.   Also complaining of right-sided elbow pain.  Feeling like presence of foreign body.  No fall or injury.  No swelling or redness.  Does have on and off pain.  No limitation in range of motion.  Not taking any medication.  CMP:  Lab Results   Component Value Date/Time     03/07/2024 08:15 AM    K 4.3 03/07/2024 08:15 AM    CL 99 03/07/2024 08:15 AM    CO2 28 03/07/2024 08:15 AM    BUN 13 03/07/2024 08:15 AM    CREATININE 0.9 03/07/2024 08:15 AM    GLUCOSE 110 03/07/2024 08:15 AM    CALCIUM 9.7 03/07/2024 08:15 AM    PROT 7.3 03/07/2024 08:15 AM    LABALBU 4.4 03/07/2024 08:15 AM    BILITOT 0.7 03/07/2024 08:15 AM    AST 31 03/07/2024 08:15 AM    ALT 40 03/07/2024 08:15 AM          CBC:  Lab Results   Component Value Date/Time    WBC 6.5 03/07/2024 08:15 AM    WBC 6.4 12/03/2020 12:00 AM    RBC 4.75 03/07/2024 08:15 AM    HGB 13.7 03/07/2024 08:15 AM    HCT 44.0 03/07/2024 08:15 AM    MCV 93  - - -

## 2024-03-25 ENCOUNTER — APPOINTMENT (OUTPATIENT)
Dept: ORTHOPEDIC SURGERY | Facility: CLINIC | Age: 54
End: 2024-03-25
Payer: COMMERCIAL

## 2024-03-25 ENCOUNTER — NON-APPOINTMENT (OUTPATIENT)
Age: 54
End: 2024-03-25

## 2024-03-25 VITALS — BODY MASS INDEX: 32.2 KG/M2 | WEIGHT: 230 LBS | HEIGHT: 71 IN

## 2024-03-25 DIAGNOSIS — M16.11 UNILATERAL PRIMARY OSTEOARTHRITIS, RIGHT HIP: ICD-10-CM

## 2024-03-25 DIAGNOSIS — M16.12 UNILATERAL PRIMARY OSTEOARTHRITIS, LEFT HIP: ICD-10-CM

## 2024-03-25 PROCEDURE — 73502 X-RAY EXAM HIP UNI 2-3 VIEWS: CPT

## 2024-03-25 PROCEDURE — 99204 OFFICE O/P NEW MOD 45 MIN: CPT

## 2024-03-25 NOTE — HISTORY OF PRESENT ILLNESS
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  2017 patient began to experience spontaneous onset of intermittent pain right groin provoked by sitting turning in bed.  Is less symptomatic when walking.  Uses Advil and Tylenol as needed was incomplete symptom relief.  Occasionally has been experiencing pain in the right buttock for the past month denies neurovascular symptoms in lower extremity has a medical history of polycystic kidney disease and advised against taking NSAIDs

## 2024-03-25 NOTE — DISCUSSION/SUMMARY
[de-identified] : Impression osteoarthritis right hip, asymptomatic osteoarthritis left hip Plan patient will consult with nephrologist as to whether she is cleared for taking NSAIDs or not if not she may be candidate for trial of intra-articular steroid injection to defer if possible consideration for total hip replacement given her young age

## 2024-03-25 NOTE — PHYSICAL EXAM
[de-identified] : Constitutional:Well nourished , well developed and in no acute distress Psychiatric: Alert and oriented to time place and person.Appropriate affect  Skin:Head, neck, arms and lower extremities:no lesions or discoloration HEENT: Normocephalic, EOM intact, Nasal septum midline, Respiratory: Unlabored respirations,no audible wheezing ,no tachypnea, no cyanosis Cardiovascular: no leg swelling  no ankle edema no JVD, pulse regular Vascular: no calf or thigh tenderness,  Peripheral pulses; intact Lymphatics:No groin adenopathy,no lymphedema lower  or upper extremities Right hip passive range of motion restricted and painful flexion 90 internal 20 external 30.  Abduction 30 pain-free adduction 40 pain-free [de-identified] : X-ray AP pelvis right hip reveal joint space narrowing right greater than left with right hip femoral head marginal osteophyte

## 2024-04-02 ENCOUNTER — APPOINTMENT (OUTPATIENT)
Dept: ULTRASOUND IMAGING | Facility: HOSPITAL | Age: 54
End: 2024-04-02

## 2024-04-27 ENCOUNTER — EMERGENCY (EMERGENCY)
Facility: HOSPITAL | Age: 54
LOS: 1 days | Discharge: HOME | End: 2024-04-27
Attending: EMERGENCY MEDICINE | Admitting: EMERGENCY MEDICINE
Payer: COMMERCIAL

## 2024-04-27 VITALS
HEART RATE: 75 BPM | DIASTOLIC BLOOD PRESSURE: 70 MMHG | WEIGHT: 229.94 LBS | SYSTOLIC BLOOD PRESSURE: 137 MMHG | TEMPERATURE: 98 F | RESPIRATION RATE: 18 BRPM | OXYGEN SATURATION: 99 % | HEIGHT: 71 IN

## 2024-04-27 VITALS
RESPIRATION RATE: 20 BRPM | SYSTOLIC BLOOD PRESSURE: 140 MMHG | HEART RATE: 80 BPM | DIASTOLIC BLOOD PRESSURE: 72 MMHG | OXYGEN SATURATION: 98 %

## 2024-04-27 DIAGNOSIS — Z98.890 OTHER SPECIFIED POSTPROCEDURAL STATES: Chronic | ICD-10-CM

## 2024-04-27 LAB
ALBUMIN SERPL ELPH-MCNC: 3.6 G/DL — SIGNIFICANT CHANGE UP (ref 3.3–5)
ALP SERPL-CCNC: 81 U/L — SIGNIFICANT CHANGE UP (ref 40–120)
ALT FLD-CCNC: 17 U/L — SIGNIFICANT CHANGE UP (ref 10–45)
ANION GAP SERPL CALC-SCNC: 7 MMOL/L — SIGNIFICANT CHANGE UP (ref 5–17)
APTT BLD: 36 SEC — HIGH (ref 24.5–35.6)
AST SERPL-CCNC: 13 U/L — SIGNIFICANT CHANGE UP (ref 10–40)
BASOPHILS # BLD AUTO: 0.04 K/UL — SIGNIFICANT CHANGE UP (ref 0–0.2)
BASOPHILS NFR BLD AUTO: 0.4 % — SIGNIFICANT CHANGE UP (ref 0–2)
BILIRUB SERPL-MCNC: 0.4 MG/DL — SIGNIFICANT CHANGE UP (ref 0.2–1.2)
BLD GP AB SCN SERPL QL: SIGNIFICANT CHANGE UP
BUN SERPL-MCNC: 23 MG/DL — SIGNIFICANT CHANGE UP (ref 7–23)
CALCIUM SERPL-MCNC: 8.6 MG/DL — SIGNIFICANT CHANGE UP (ref 8.4–10.5)
CHLORIDE SERPL-SCNC: 106 MMOL/L — SIGNIFICANT CHANGE UP (ref 96–108)
CO2 SERPL-SCNC: 28 MMOL/L — SIGNIFICANT CHANGE UP (ref 22–31)
CREAT SERPL-MCNC: 0.86 MG/DL — SIGNIFICANT CHANGE UP (ref 0.5–1.3)
EGFR: 81 ML/MIN/1.73M2 — SIGNIFICANT CHANGE UP
EOSINOPHIL # BLD AUTO: 0.05 K/UL — SIGNIFICANT CHANGE UP (ref 0–0.5)
EOSINOPHIL NFR BLD AUTO: 0.5 % — SIGNIFICANT CHANGE UP (ref 0–6)
GLUCOSE SERPL-MCNC: 154 MG/DL — HIGH (ref 70–99)
HCT VFR BLD CALC: 36.8 % — SIGNIFICANT CHANGE UP (ref 34.5–45)
HGB BLD-MCNC: 12.1 G/DL — SIGNIFICANT CHANGE UP (ref 11.5–15.5)
IMM GRANULOCYTES NFR BLD AUTO: 0.3 % — SIGNIFICANT CHANGE UP (ref 0–0.9)
INR BLD: 1.19 RATIO — HIGH (ref 0.85–1.18)
LYMPHOCYTES # BLD AUTO: 1.28 K/UL — SIGNIFICANT CHANGE UP (ref 1–3.3)
LYMPHOCYTES # BLD AUTO: 14 % — SIGNIFICANT CHANGE UP (ref 13–44)
MCHC RBC-ENTMCNC: 25.5 PG — LOW (ref 27–34)
MCHC RBC-ENTMCNC: 32.9 GM/DL — SIGNIFICANT CHANGE UP (ref 32–36)
MCV RBC AUTO: 77.6 FL — LOW (ref 80–100)
MONOCYTES # BLD AUTO: 0.34 K/UL — SIGNIFICANT CHANGE UP (ref 0–0.9)
MONOCYTES NFR BLD AUTO: 3.7 % — SIGNIFICANT CHANGE UP (ref 2–14)
NEUTROPHILS # BLD AUTO: 7.4 K/UL — SIGNIFICANT CHANGE UP (ref 1.8–7.4)
NEUTROPHILS NFR BLD AUTO: 81.1 % — HIGH (ref 43–77)
NRBC # BLD: 0 /100 WBCS — SIGNIFICANT CHANGE UP (ref 0–0)
PLATELET # BLD AUTO: 152 K/UL — SIGNIFICANT CHANGE UP (ref 150–400)
POTASSIUM SERPL-MCNC: 4.1 MMOL/L — SIGNIFICANT CHANGE UP (ref 3.5–5.3)
POTASSIUM SERPL-SCNC: 4.1 MMOL/L — SIGNIFICANT CHANGE UP (ref 3.5–5.3)
PROT SERPL-MCNC: 7.4 G/DL — SIGNIFICANT CHANGE UP (ref 6–8.3)
PROTHROM AB SERPL-ACNC: 13.9 SEC — HIGH (ref 9.5–13)
RBC # BLD: 4.74 M/UL — SIGNIFICANT CHANGE UP (ref 3.8–5.2)
RBC # FLD: 16.1 % — HIGH (ref 10.3–14.5)
SODIUM SERPL-SCNC: 141 MMOL/L — SIGNIFICANT CHANGE UP (ref 135–145)
WBC # BLD: 9.14 K/UL — SIGNIFICANT CHANGE UP (ref 3.8–10.5)
WBC # FLD AUTO: 9.14 K/UL — SIGNIFICANT CHANGE UP (ref 3.8–10.5)

## 2024-04-27 PROCEDURE — 86901 BLOOD TYPING SEROLOGIC RH(D): CPT

## 2024-04-27 PROCEDURE — 36415 COLL VENOUS BLD VENIPUNCTURE: CPT

## 2024-04-27 PROCEDURE — 93010 ELECTROCARDIOGRAM REPORT: CPT

## 2024-04-27 PROCEDURE — 80053 COMPREHEN METABOLIC PANEL: CPT

## 2024-04-27 PROCEDURE — 99284 EMERGENCY DEPT VISIT MOD MDM: CPT

## 2024-04-27 PROCEDURE — 99284 EMERGENCY DEPT VISIT MOD MDM: CPT | Mod: 25

## 2024-04-27 PROCEDURE — 85025 COMPLETE CBC W/AUTO DIFF WBC: CPT

## 2024-04-27 PROCEDURE — 86900 BLOOD TYPING SEROLOGIC ABO: CPT

## 2024-04-27 PROCEDURE — 86850 RBC ANTIBODY SCREEN: CPT

## 2024-04-27 PROCEDURE — 93005 ELECTROCARDIOGRAM TRACING: CPT

## 2024-04-27 PROCEDURE — 85610 PROTHROMBIN TIME: CPT

## 2024-04-27 PROCEDURE — 85730 THROMBOPLASTIN TIME PARTIAL: CPT

## 2024-04-27 RX ORDER — AMLODIPINE BESYLATE 2.5 MG/1
1 TABLET ORAL
Qty: 0 | Refills: 0 | DISCHARGE

## 2024-04-27 RX ORDER — CHOLECALCIFEROL (VITAMIN D3) 125 MCG
1 CAPSULE ORAL
Refills: 0 | DISCHARGE

## 2024-04-27 RX ORDER — SERTRALINE 25 MG/1
1 TABLET, FILM COATED ORAL
Refills: 0 | DISCHARGE

## 2024-04-27 RX ORDER — ZINC ACETATE DIHYDRATE 100 %
1 CRYSTALS MISCELLANEOUS
Refills: 0 | DISCHARGE

## 2024-04-27 RX ORDER — HYDROXYZINE HCL 10 MG
1 TABLET ORAL
Refills: 0 | DISCHARGE

## 2024-04-27 NOTE — ED PROVIDER NOTE - CLINICAL SUMMARY MEDICAL DECISION MAKING FREE TEXT BOX
53 year old female with vaginal bleeding, stable H&H and VS, patient wants to follow up with her GYN, advised to schedule an appointment ASAP and she voiced good understanding

## 2024-04-27 NOTE — ED ADULT NURSE NOTE - NSICDXPASTMEDICALHX_GEN_ALL_CORE_FT
PAST MEDICAL HISTORY:  Anxiety     Anxiety     Cyst of spleen     Depression     Obesity     Polycystic kidney     Polycystic kidney disease     Sleep apnea

## 2024-04-27 NOTE — ED PROVIDER NOTE - NSFOLLOWUPINSTRUCTIONS_ED_ALL_ED_FT
please follow up with  as soon as possible to reassess the symptom    return to the nearest ED immediately with any new/worsening symptoms

## 2024-04-27 NOTE — ED ADULT NURSE NOTE - OBJECTIVE STATEMENT
Pt c/o heavy vaginal bleeding with clots x 10 days, pt feels tired, same episode last month, pt. awake, alert, oriented x3, denies any other complaint at this time

## 2024-04-27 NOTE — ED PROVIDER NOTE - PATIENT PORTAL LINK FT
You can access the FollowMyHealth Patient Portal offered by [facility] by registering at the following website: [email or web address]. By joining [facility]’s FollowMyHealth portal, you will also be able to view your health information using other applications (apps) compatible with our system.

## 2024-04-27 NOTE — ED ADULT NURSE NOTE - NSFALLUNIVINTERV_ED_ALL_ED
Bed/Stretcher in lowest position, wheels locked, appropriate side rails in place/Call bell, personal items and telephone in reach/Instruct patient to call for assistance before getting out of bed/chair/stretcher/Non-slip footwear applied when patient is off stretcher/[place] to call system/Physically safe environment - no spills, clutter or unnecessary equipment/Purposeful proactive rounding/Room/bathroom lighting operational, light cord in reach

## 2024-04-27 NOTE — ED PROVIDER NOTE - OBJECTIVE STATEMENT
53 year old female with vaginal bleeding. States she felt fatigued yesterday, denies NVD, dysuria, cough, SOB, chest/abdominal/back/neck pain, HA, focal weakness/numbness. Denies any other symptoms. History of vaginal bleeding required TXA in 2022, f/u with GYN, Dr Castro, scheduled for pelvic US and bx but she missed the appointment.

## 2024-04-29 ENCOUNTER — APPOINTMENT (OUTPATIENT)
Dept: OBGYN | Facility: CLINIC | Age: 54
End: 2024-04-29
Payer: COMMERCIAL

## 2024-04-29 VITALS
HEIGHT: 71 IN | HEART RATE: 75 BPM | RESPIRATION RATE: 16 BRPM | OXYGEN SATURATION: 98 % | BODY MASS INDEX: 32.06 KG/M2 | TEMPERATURE: 97.3 F | WEIGHT: 229 LBS | DIASTOLIC BLOOD PRESSURE: 78 MMHG | SYSTOLIC BLOOD PRESSURE: 122 MMHG

## 2024-04-29 DIAGNOSIS — R87.615 UNSATISFACTORY CYTOLOGIC SMEAR OF CERVIX: ICD-10-CM

## 2024-04-29 PROCEDURE — 81003 URINALYSIS AUTO W/O SCOPE: CPT | Mod: QW

## 2024-04-29 PROCEDURE — 99214 OFFICE O/P EST MOD 30 MIN: CPT

## 2024-04-29 RX ORDER — SERTRALINE HYDROCHLORIDE 50 MG/1
50 TABLET, FILM COATED ORAL
Qty: 90 | Refills: 0 | Status: DISCONTINUED | COMMUNITY
Start: 2022-05-31 | End: 2024-04-29

## 2024-04-29 RX ORDER — AMLODIPINE BESYLATE 10 MG/1
10 TABLET ORAL
Refills: 0 | Status: ACTIVE | COMMUNITY

## 2024-04-29 RX ORDER — SERTRALINE HYDROCHLORIDE 50 MG/1
50 TABLET, FILM COATED ORAL
Refills: 0 | Status: ACTIVE | COMMUNITY

## 2024-04-29 RX ORDER — TRANEXAMIC ACID 650 MG/1
650 TABLET ORAL
Qty: 15 | Refills: 1 | Status: ACTIVE | COMMUNITY
Start: 2024-04-29 | End: 1900-01-01

## 2024-04-29 RX ORDER — SERTRALINE 25 MG/1
25 TABLET, FILM COATED ORAL
Refills: 0 | Status: ACTIVE | COMMUNITY

## 2024-04-29 NOTE — PLAN
[FreeTextEntry1] : Recommend scheduling endometrial sampling/ pelvic US/ MRI. Discussed with patient diagnostic tests for perimenopausal menorrhagia and treatment options.  Recommend DC/HYst after testing

## 2024-04-29 NOTE — COUNSELING
[Nutrition/ Exercise/ Weight Management] : nutrition, exercise, weight management [Vitamins/Supplements] : vitamins/supplements [Bladder Hygiene] : bladder hygiene [Medication Management] : medication management [FreeTextEntry2] : Recommend endometrial bx, cx biopsy- patient declined

## 2024-04-29 NOTE — PHYSICAL EXAM
[Chaperone Present] : A chaperone was present in the examining room during all aspects of the physical examination [Appropriately responsive] : appropriately responsive [Oriented x3] : oriented x3 [Labia Majora] : normal [Labia Minora] : normal [Moderate] : There was moderate vaginal bleeding [Normal] : normal [Uterine Adnexae] : normal [FreeTextEntry5] : granulation tissue at 4-7

## 2024-04-29 NOTE — HISTORY OF PRESENT ILLNESS
[FreeTextEntry1] : 54 yo with h/o of menorrhagia. with irregular bleeding. polypectomy 2022, presents to office for [facility] ER visit on Sat with the cc of  heavy vaginal bleeding for 14 days.  Patient state H?H was stable and no medication or radiological studies were performed. [PapSmeardate] : 03/24 [TextBox_31] : Unsatisffactory [Irregular Menstrual Interval] : irregular menstrual interval [Abnormal Quantity] : abnormal quantity [Abnormal Duration] : abnormal duration [___ Days] : [unfilled] days [Heavy Bleeding] : heavy bleeding

## 2024-04-30 LAB — HPV HIGH+LOW RISK DNA PNL CVX: NOT DETECTED

## 2024-05-02 ENCOUNTER — APPOINTMENT (OUTPATIENT)
Dept: SURGICAL ONCOLOGY | Facility: CLINIC | Age: 54
End: 2024-05-02
Payer: COMMERCIAL

## 2024-05-02 LAB
CANDIDA VAG CYTO: NOT DETECTED
G VAGINALIS+PREV SP MTYP VAG QL MICRO: NOT DETECTED
HPV 16 E6+E7 MRNA CVX QL NAA+PROBE: NOT DETECTED
HPV18+45 E6+E7 MRNA CVX QL NAA+PROBE: NOT DETECTED
T VAGINALIS VAG QL WET PREP: NOT DETECTED

## 2024-05-02 PROCEDURE — 99214 OFFICE O/P EST MOD 30 MIN: CPT | Mod: 95

## 2024-05-05 LAB — CYTOLOGY CVX/VAG DOC THIN PREP: NORMAL

## 2024-05-08 ENCOUNTER — APPOINTMENT (OUTPATIENT)
Dept: MRI IMAGING | Facility: CLINIC | Age: 54
End: 2024-05-08
Payer: COMMERCIAL

## 2024-05-08 ENCOUNTER — APPOINTMENT (OUTPATIENT)
Dept: ULTRASOUND IMAGING | Facility: CLINIC | Age: 54
End: 2024-05-08
Payer: COMMERCIAL

## 2024-05-08 ENCOUNTER — OUTPATIENT (OUTPATIENT)
Dept: OUTPATIENT SERVICES | Facility: HOSPITAL | Age: 54
LOS: 1 days | End: 2024-05-08
Payer: COMMERCIAL

## 2024-05-08 DIAGNOSIS — Z98.890 OTHER SPECIFIED POSTPROCEDURAL STATES: Chronic | ICD-10-CM

## 2024-05-08 DIAGNOSIS — N92.1 EXCESSIVE AND FREQUENT MENSTRUATION WITH IRREGULAR CYCLE: ICD-10-CM

## 2024-05-08 PROCEDURE — 76830 TRANSVAGINAL US NON-OB: CPT | Mod: 26

## 2024-05-08 PROCEDURE — 76856 US EXAM PELVIC COMPLETE: CPT | Mod: 26

## 2024-05-08 PROCEDURE — 76856 US EXAM PELVIC COMPLETE: CPT

## 2024-05-08 PROCEDURE — 72197 MRI PELVIS W/O & W/DYE: CPT | Mod: 26

## 2024-05-08 PROCEDURE — 76830 TRANSVAGINAL US NON-OB: CPT

## 2024-05-08 PROCEDURE — 72197 MRI PELVIS W/O & W/DYE: CPT

## 2024-05-08 PROCEDURE — A9585: CPT

## 2024-05-15 ENCOUNTER — APPOINTMENT (OUTPATIENT)
Dept: OBGYN | Facility: CLINIC | Age: 54
End: 2024-05-15
Payer: COMMERCIAL

## 2024-05-15 VITALS
TEMPERATURE: 97.6 F | WEIGHT: 229 LBS | HEART RATE: 78 BPM | SYSTOLIC BLOOD PRESSURE: 126 MMHG | OXYGEN SATURATION: 98 % | DIASTOLIC BLOOD PRESSURE: 80 MMHG | BODY MASS INDEX: 43.23 KG/M2 | HEIGHT: 61 IN

## 2024-05-15 PROCEDURE — 99213 OFFICE O/P EST LOW 20 MIN: CPT

## 2024-05-20 NOTE — HISTORY OF PRESENT ILLNESS
[de-identified] : This telephonic visit was provided via real-time 2-way audio visual technology. The patient, Linsey Hurtado was located at home, [address], [place] [address]. at the time of the visit. The provider, was located at the clinic at [address] [place] [address] at the time of the visit. The patient, Manuel Potter and provider participated in the telehealth encounter. Verbal consent was given on 5/2/2024 by the patient.  Linsey Hurtado is a 52 yr old female, presenting today for a follow up Surgical Oncology consultation regarding a splenic mass.   She was diagnosed at age 32 with polycystic kidney disease and has been monitoring her kidney and liver for cysts ever since with yearly ultrasounds.   She underwent a CT scan in 2003 for abdominal pain at [facility] which revealed multiple bilateral renal and hepatic cysts.  An US performed on 6/6/2022 revealed hepatomegaly and innumerable hepatic and renal cysts in addition to a splenic lesion measuring 6.2 x 6.7 x 6.3 cm, not seen in prior study (7/28/2020).   She subsequently underwent an MRI on 6/15/2022 which showed borderline in size spleen (13cm), mass involving the posterior aspect of the spleen measuring 7 x 6 cm, which does not show typical enhancement features of hemangioma. A repeat MRI performed on 6/1/23 revealed a slight increase in size of the splenic lesion measuring  8.0 x 7.5 cm, previously measured 7.2 x 7.3 cm in size.   Her case was presented at [facility] and the consensus was to perform a splenectomy vs PET and biopsy, with the understanding that the gold standard to rule out malignancy would be final pathology. On 7/9/23 she underwent a PET/CT which showed no FDG avidity in the splenic lesion.   She saw Dr Bee for a biopsy of the splenic lesion on 10/11/23 but has decided against biopsy due to potential risks. 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 She was scheduled for a resection but canceled this due to personal reasons.   Today she presents for a follow up visit, complaining of vaginal bleeding for 2 weeks, which has cause her to go to the ED recently. She is currently undergoing work up with her gyn for that.

## 2024-05-20 NOTE — ASSESSMENT
[FreeTextEntry1] : 51 y/o female with a history of polycystic kidney disease presents with an incidental finding of a 8 cm splenic mass seen since 2022 with some enlargement since her last MRI. She is currently undergoing workup with her GYN for vaginal bleeding, pending mammogram, abdominal US and a possible bx. I recommended that we hold off on proceeding surgery, until her workup is completed. She agrees with plan and verbalized understanding. She will follow up with me after her repeat CT A/P scan in July 2024. or sooner if needed.   She asked many good questions that I answered to the best of my ab Sincerely,  Aundrea Marte MD Division of Surgical Oncology Director, [facility] , [employer] Surgical Oncology.

## 2024-05-21 NOTE — DISCUSSION/SUMMARY
[FreeTextEntry1] : 52yo h/o of heavy menses requiring ED evaluation presenting for EMBx and to discuss treatment options for her perimenopausal menorrhagia. She opted not to have the biopsy today.  She is aware that tissue sampling is important to rule out any abnormal pathology and deciding on treatment options to decrease her abnormal bleeding.  Patient opting to use Tranexamic acid.  She agreed to return to office for biopsy after her splenic mass  surgery.  Patient was given the opportunity to ask questions. Her questions have been answered to her apparent satisfaction.

## 2024-05-21 NOTE — HISTORY OF PRESENT ILLNESS
[Patient reported PAP Smear was normal] : Patient reported PAP Smear was normal [FreeTextEntry1] : 52 yo presenting for  EMBx and discussion of treatment options  for abnormal heavy vaginal bleeding which prompted her to go to ED. Pelvic US within normal limits. Pelvic MRI results pending. She is scheduled for surgery for   splenic mass on 6/6/26.  She states that she'll have her menses at that time  and desires to be given medication to decrease  heavy bleeding.   She states her bleeding decreased after taking Tranexamic Acid.  Pt  has decided against endometrial biopsy today.. [PapSmeardate] : 04/24

## 2024-06-07 NOTE — ED ADULT NURSE NOTE - NS ED NURSE DISCH DISPOSITION
John Chavis  1959  [id number]      HISTORY OF PRESENT ILLNESS:  Mr. Chavis is a 64 y.o. male returns for a follow up visit for pain management  He has a diagnosis of   1. Chronic pain syndrome    2. Primary insomnia    3. Fibromyalgia    4. Failed neck syndrome    5. DDD (degenerative disc disease), lumbar    6. Mood disorder (HCC)    7. Postlaminectomy syndrome, lumbar region    8. Lumbar spondylosis    9. Lumbar radiculitis    .      As per information/history obtained from the PADT(patient assessment and documentation tool) -  He complains of pain in the neck, upper back, mid back, and lower back with radiation to the hands Left, buttocks, hips Left, upper leg Left, knees Left, lower leg Left, and feet Right He rates the pain 6/10 and describes it as sharp, aching, numbness, pins and needles.  Pain is made worse by: nothing, movement, walking, standing, sitting, bending, lifting. He denies any side effects from the current pain regimen. Patient reports that since last follow up visit the physical functioning is unchanged, family/social relationships are unchanged, mood is unchanged sleep patterns are unchanged. Mr. Chavis states that since starting the treatment with the current regimen the  overall functioning  in the above aspects is  better, Patient denies misusing/abusing his narcotic pain medications or using any illegal drugs.  There are No indicators for possible drug abuse, addiction or diversion problems. Upon obtaining the medical history from Mr. Chavis regarding today's office visit for his presenting problems, patient states the injection did not help much last time. Mr. Chavis mentions he is using Voltaren along with Percocet 4 per day. Patient reports he is working full time. He states the pain is worse in the back than the legs. Patient denies any constipation symptoms or cognitive side effects.        ALLERGIES: Patients list of allergies were reviewed     MEDICATIONS:  
Discharged

## 2024-06-14 ENCOUNTER — RESULT REVIEW (OUTPATIENT)
Age: 54
End: 2024-06-14

## 2024-06-14 ENCOUNTER — APPOINTMENT (OUTPATIENT)
Dept: ULTRASOUND IMAGING | Facility: HOSPITAL | Age: 54
End: 2024-06-14
Payer: COMMERCIAL

## 2024-06-14 ENCOUNTER — APPOINTMENT (OUTPATIENT)
Dept: MAMMOGRAPHY | Facility: HOSPITAL | Age: 54
End: 2024-06-14
Payer: COMMERCIAL

## 2024-06-14 ENCOUNTER — OUTPATIENT (OUTPATIENT)
Dept: OUTPATIENT SERVICES | Facility: HOSPITAL | Age: 54
LOS: 1 days | End: 2024-06-14
Payer: COMMERCIAL

## 2024-06-14 DIAGNOSIS — Z12.39 ENCOUNTER FOR OTHER SCREENING FOR MALIGNANT NEOPLASM OF BREAST: ICD-10-CM

## 2024-06-14 DIAGNOSIS — Z98.890 OTHER SPECIFIED POSTPROCEDURAL STATES: Chronic | ICD-10-CM

## 2024-06-14 DIAGNOSIS — N92.1 EXCESSIVE AND FREQUENT MENSTRUATION WITH IRREGULAR CYCLE: ICD-10-CM

## 2024-06-14 PROCEDURE — 77067 SCR MAMMO BI INCL CAD: CPT

## 2024-06-14 PROCEDURE — 76641 ULTRASOUND BREAST COMPLETE: CPT | Mod: 26,50

## 2024-06-14 PROCEDURE — 77067 SCR MAMMO BI INCL CAD: CPT | Mod: 26

## 2024-06-14 PROCEDURE — 77063 BREAST TOMOSYNTHESIS BI: CPT | Mod: 26

## 2024-06-14 PROCEDURE — 76641 ULTRASOUND BREAST COMPLETE: CPT

## 2024-06-14 PROCEDURE — 77063 BREAST TOMOSYNTHESIS BI: CPT

## 2024-06-20 ENCOUNTER — APPOINTMENT (OUTPATIENT)
Dept: OBGYN | Facility: CLINIC | Age: 54
End: 2024-06-20
Payer: COMMERCIAL

## 2024-06-20 VITALS
HEIGHT: 61 IN | HEART RATE: 83 BPM | OXYGEN SATURATION: 98 % | BODY MASS INDEX: 43.23 KG/M2 | WEIGHT: 229 LBS | SYSTOLIC BLOOD PRESSURE: 128 MMHG | DIASTOLIC BLOOD PRESSURE: 80 MMHG | TEMPERATURE: 97.5 F

## 2024-06-20 DIAGNOSIS — N92.1 EXCESSIVE AND FREQUENT MENSTRUATION WITH IRREGULAR CYCLE: ICD-10-CM

## 2024-06-20 DIAGNOSIS — N84.1 POLYP OF CERVIX UTERI: ICD-10-CM

## 2024-06-20 LAB
HCG UR QL: NEGATIVE
QUALITY CONTROL: YES

## 2024-06-20 PROCEDURE — 58100 BIOPSY OF UTERUS LINING: CPT

## 2024-06-20 NOTE — PROCEDURE
[Endometrial Biopsy] : Endometrial biopsy [Consent Obtained] : Consent obtained [Irregular Bleeding] : irregular uterine bleeding [Risks] : risks [Benefits] : benefits [Alternatives] : alternatives [Patient] : patient [Infection] : infection [Bleeding] : bleeding [Allergic Reaction] : allergic reaction [Uterine Perforation] : uterine perforation [Pain] : pain [Ibuprofen ___ mg] : ibuprofen [unfilled] ~Umg [Betadine] : Betadine [Tenaculum] : Tenaculum [Required Dilation] : required dilation [Sounded to ___ cm] : sounded to [unfilled] ~Ucm [Moderate] : moderate [Specimen Collected] : collected [Sent to Pathology] : placed in buffered formalin and sent for pathology [ECC] : Endocervical curettage was also performed [Tolerated Well] : Patient tolerated the procedure well [No Complications] : No complications [LMPDate] : 5/28//24 [de-identified] : cervical granulation tissue/polyp at 6'oclock removed with punch biopsy forceps. hemostasis obtained with Monsel's solution [de-identified] : small curette, pipelle suction, dilator, punch biopsy forceps

## 2024-06-20 NOTE — COUNSELING
[Nutrition/ Exercise/ Weight Management] : nutrition, exercise, weight management [Breast Self Exam] : breast self exam [Bladder Hygiene] : bladder hygiene [Pre/Post Op Instructions] : pre/post op instructions

## 2024-06-27 LAB — CORE LAB BIOPSY: NORMAL

## 2024-07-08 ENCOUNTER — OUTPATIENT (OUTPATIENT)
Dept: OUTPATIENT SERVICES | Facility: HOSPITAL | Age: 54
LOS: 1 days | End: 2024-07-08

## 2024-07-08 VITALS
DIASTOLIC BLOOD PRESSURE: 76 MMHG | OXYGEN SATURATION: 99 % | HEART RATE: 78 BPM | SYSTOLIC BLOOD PRESSURE: 118 MMHG | WEIGHT: 227.08 LBS | HEIGHT: 70.5 IN | RESPIRATION RATE: 15 BRPM | TEMPERATURE: 98 F

## 2024-07-08 DIAGNOSIS — Z98.890 OTHER SPECIFIED POSTPROCEDURAL STATES: Chronic | ICD-10-CM

## 2024-07-08 DIAGNOSIS — I10 ESSENTIAL (PRIMARY) HYPERTENSION: ICD-10-CM

## 2024-07-08 DIAGNOSIS — R16.1 SPLENOMEGALY, NOT ELSEWHERE CLASSIFIED: ICD-10-CM

## 2024-07-08 DIAGNOSIS — G47.33 OBSTRUCTIVE SLEEP APNEA (ADULT) (PEDIATRIC): ICD-10-CM

## 2024-07-08 DIAGNOSIS — Z92.89 PERSONAL HISTORY OF OTHER MEDICAL TREATMENT: Chronic | ICD-10-CM

## 2024-07-08 DIAGNOSIS — D73.89 OTHER DISEASES OF SPLEEN: ICD-10-CM

## 2024-07-08 DIAGNOSIS — F41.9 ANXIETY DISORDER, UNSPECIFIED: ICD-10-CM

## 2024-07-08 LAB
A1C WITH ESTIMATED AVERAGE GLUCOSE RESULT: 5.5 % — SIGNIFICANT CHANGE UP (ref 4–5.6)
ANION GAP SERPL CALC-SCNC: 13 MMOL/L — SIGNIFICANT CHANGE UP (ref 7–14)
APTT BLD: 35.1 SEC — SIGNIFICANT CHANGE UP (ref 24.5–35.6)
BLD GP AB SCN SERPL QL: NEGATIVE — SIGNIFICANT CHANGE UP
BUN SERPL-MCNC: 19 MG/DL — SIGNIFICANT CHANGE UP (ref 7–23)
CALCIUM SERPL-MCNC: 9 MG/DL — SIGNIFICANT CHANGE UP (ref 8.4–10.5)
CHLORIDE SERPL-SCNC: 102 MMOL/L — SIGNIFICANT CHANGE UP (ref 98–107)
CO2 SERPL-SCNC: 25 MMOL/L — SIGNIFICANT CHANGE UP (ref 22–31)
CREAT SERPL-MCNC: 0.66 MG/DL — SIGNIFICANT CHANGE UP (ref 0.5–1.3)
EGFR: 106 ML/MIN/1.73M2 — SIGNIFICANT CHANGE UP
ESTIMATED AVERAGE GLUCOSE: 111 — SIGNIFICANT CHANGE UP
GLUCOSE SERPL-MCNC: 107 MG/DL — HIGH (ref 70–99)
HCG SERPL-ACNC: <1 MIU/ML — SIGNIFICANT CHANGE UP
HCT VFR BLD CALC: 39.1 % — SIGNIFICANT CHANGE UP (ref 34.5–45)
HGB BLD-MCNC: 11.7 G/DL — SIGNIFICANT CHANGE UP (ref 11.5–15.5)
INR BLD: 1.22 RATIO — HIGH (ref 0.85–1.18)
MCHC RBC-ENTMCNC: 22.7 PG — LOW (ref 27–34)
MCHC RBC-ENTMCNC: 29.9 GM/DL — LOW (ref 32–36)
MCV RBC AUTO: 75.8 FL — LOW (ref 80–100)
NRBC # BLD: 0 /100 WBCS — SIGNIFICANT CHANGE UP (ref 0–0)
NRBC # FLD: 0 K/UL — SIGNIFICANT CHANGE UP (ref 0–0)
PLATELET # BLD AUTO: 208 K/UL — SIGNIFICANT CHANGE UP (ref 150–400)
POTASSIUM SERPL-MCNC: 4.4 MMOL/L — SIGNIFICANT CHANGE UP (ref 3.5–5.3)
POTASSIUM SERPL-SCNC: 4.4 MMOL/L — SIGNIFICANT CHANGE UP (ref 3.5–5.3)
PROTHROM AB SERPL-ACNC: 13.6 SEC — HIGH (ref 9.5–13)
RBC # BLD: 5.16 M/UL — SIGNIFICANT CHANGE UP (ref 3.8–5.2)
RBC # FLD: 15.2 % — HIGH (ref 10.3–14.5)
RH IG SCN BLD-IMP: POSITIVE — SIGNIFICANT CHANGE UP
SODIUM SERPL-SCNC: 140 MMOL/L — SIGNIFICANT CHANGE UP (ref 135–145)
WBC # BLD: 7.25 K/UL — SIGNIFICANT CHANGE UP (ref 3.8–10.5)
WBC # FLD AUTO: 7.25 K/UL — SIGNIFICANT CHANGE UP (ref 3.8–10.5)

## 2024-07-08 NOTE — H&P PST ADULT - GENITOURINARY COMMENTS
not examined patient reports she has been following up with GYN Dr Castro for menorrhagia since 02/2024, s/p TVS, Pelvic MRI negative for uterine polyps, plan for IUD- 07/11/2024

## 2024-07-08 NOTE — H&P PST ADULT - CARDIOVASCULAR COMMENTS
mild dependent edema- non pitting patient reports of chronic anxiety and panic attacks with intermittent palpitations patient reports of chronic anxiety and panic attacks associated with intermittent palpitations

## 2024-07-08 NOTE — H&P PST ADULT - RESPIRATORY AND THORAX COMMENTS
Pt hx atypical  pneumonia 1/2024 - treated with antibiotics,  recent CXR clear now and pt denies SOB or cough

## 2024-07-08 NOTE — H&P PST ADULT - GASTROINTESTINAL COMMENTS
pre op dx- Splenic mass Hx polycystic disease with cyst of spleen that has increased in size and now for removal - hx of liver and kidney cysts that have also been monitored.

## 2024-07-08 NOTE — H&P PST ADULT - PROBLEM SELECTOR PLAN 1
Patient is tentatively scheduled for Robotic splenectomy, possible hepatic cyst fenestration- with Dr Mrate- 07/15/24.    Pre-op instructions provided. Pt given verbal and written instructions with teach back on chlorhexidine wash and pepcid. Pt verbalized understanding with return demonstration.    CBC, BMP, A1C, COAGS, HCG, T&S sent from PST- patient going for high risk surgery.( patient has menorrhagia currently under treatment with GYN- plan for IUD insertion on 07/11/24.  ABO in sunrise- 23/feb/2024. ***** Patient surgery was initially scheduled on 02/2024, which was cancelled by patient due to severe anxiety issues.      Patient is tentatively scheduled for Robotic splenectomy, possible hepatic cyst fenestration- with Dr Marte- 07/15/24.    Pre-op instructions provided. Pt given verbal and written instructions with teach back on chlorhexidine wash and pepcid. Pt verbalized understanding with return demonstration.    CBC, BMP, A1C, COAGS, HCG, T&S sent from PST- patient going for high risk surgery.    ******* MENORRHAGIA  patient has menorrhagia currently under treatment with GYN Dr Castro- s/p endometrial biopsy - 06/20/24, plan for IUD insertion on 07/11/24.  ABO in sunrise- 23/feb/2024.  Urine cup provided for day of procedure pregnancy test.

## 2024-07-08 NOTE — H&P PST ADULT - RS GEN HX ROS MEA POS PC
patient with childhood asthma- with recent reactive airway disease in 01/2024 due to atypical PNA- used rescue inhaler then

## 2024-07-08 NOTE — H&P PST ADULT - HISTORY OF PRESENT ILLNESS
53 yr old female, presents to [facility], with pre op diagnosis of splenic mass, for pre op evaluation prior to scheduled for Robotic Splenectomy Poss Hepatic Cyst Fenestration with Dr Marte tentatively on 07/15/24.  history of polycystic kidney disease, with an incidental finding of a 8 cm splenic mass seen since 2022 with some enlargement since her last MRI.       ***** Patient surgery was initially scheduled on 02/2024, which was cancelled by patient due to severe anxiety issues.

## 2024-07-08 NOTE — H&P PST ADULT - IS PATIENT PREGNANT?
It was noticed that she has darkening of the skin in the axillary area as well as the proximal medial surface of the thighs as well as the skin over her joints. The signs are suggestive of acanthosis nigricans. Lab work including CMP, hemoglobin A1c, TSH was requested. We will update mom regarding any concerns.
Radha  has a history of seasonal allergies and uses Claritin Flonase and Zaditor as needed for symptoms. Currently she does not have any significant symptoms. She will call us back with any concerns.
Regarding her complaint about back pain she will be referred to orthopedic clinic. It seems that part of the problem may be poor posture when she is sitting and part of the problem may be her weight.
The young lady and mom have concerns about anxiety and the young lady is agreeable to counseling. List of behavioral health providers in the community was given as well as referral to [facility] [facility]. Janie eaton denies that she would be interested in hurting herself. She denies that there was bullying at school and denies that anybody is giving her hard time. She states that during the school year she was able to sleep 8 hours and had no issues in that regard. She states that during the summer vacation she has started having difficulty falling asleep and staying asleep. She was reminded to avoid watching scary and violent content and to limit her screen time is spent time every day walking outside with her mom and exercising at home and at the park close to their house. She was encouraged to spend time learning things that she enjoys such as improving her [place] language skills and cooking.
The young lady had a history of spinal asymmetry. At this office visit very minimal spinal asymmetry was noted on forward flexion. X-ray scoliosis series was not requested. She will be referred to orthopedic clinic because of recurrent midthoracic back pain.
The young lady has poor vision but she was able to pass her vision screen with her eyeglasses. She will follow-up with optometrist on a yearly basis. Was reminded to wear eyeglasses from when she wakes up in the morning to when she goes to bed at the eye so the weaker eye would not become weaker with time.
The young lady was noted to have abnormal hearing screen at the 500 Hz frequency range. She states that she has headsets. She was reminded to keep the volume down when she is listening to any content because hearing loss from loud noise is irreversible. We will reevaluate her hearing screen next year and meanwhile we will send her to audiology for formal hearing evaluation and mom is agreeable with the above plan.
no

## 2024-07-08 NOTE — H&P PST ADULT - GASTROINTESTINAL
details… soft/nontender/nondistended/normal active bowel sounds/no guarding/no rigidity soft/nondistended/normal active bowel sounds/no guarding/no rigidity

## 2024-07-08 NOTE — H&P PST ADULT - NSICDXPASTSURGICALHX_GEN_ALL_CORE_FT
PAST SURGICAL HISTORY:  H/O endoscopy     History of D&C     History of endometrial biopsy     History of hysteroscopy     S/P breast biopsy

## 2024-07-10 RX ORDER — ALPRAZOLAM 0.5 MG/1
0.5 TABLET ORAL
Qty: 4 | Refills: 0 | Status: ACTIVE | COMMUNITY
Start: 2024-07-10 | End: 1900-01-01

## 2024-07-11 ENCOUNTER — APPOINTMENT (OUTPATIENT)
Dept: OBGYN | Facility: CLINIC | Age: 54
End: 2024-07-11

## 2024-07-11 ENCOUNTER — NON-APPOINTMENT (OUTPATIENT)
Age: 54
End: 2024-07-11

## 2024-07-12 ENCOUNTER — APPOINTMENT (OUTPATIENT)
Dept: CT IMAGING | Facility: HOSPITAL | Age: 54
End: 2024-07-12
Payer: COMMERCIAL

## 2024-07-12 ENCOUNTER — OUTPATIENT (OUTPATIENT)
Dept: OUTPATIENT SERVICES | Facility: HOSPITAL | Age: 54
LOS: 1 days | End: 2024-07-12
Payer: COMMERCIAL

## 2024-07-12 DIAGNOSIS — Z98.890 OTHER SPECIFIED POSTPROCEDURAL STATES: Chronic | ICD-10-CM

## 2024-07-12 DIAGNOSIS — D73.89 OTHER DISEASES OF SPLEEN: ICD-10-CM

## 2024-07-12 DIAGNOSIS — Z92.89 PERSONAL HISTORY OF OTHER MEDICAL TREATMENT: Chronic | ICD-10-CM

## 2024-07-12 PROBLEM — N84.0 POLYP OF CORPUS UTERI: Chronic | Status: ACTIVE | Noted: 2024-07-08

## 2024-07-12 PROBLEM — J45.909 UNSPECIFIED ASTHMA, UNCOMPLICATED: Chronic | Status: ACTIVE | Noted: 2024-07-08

## 2024-07-12 PROBLEM — I10 ESSENTIAL (PRIMARY) HYPERTENSION: Chronic | Status: ACTIVE | Noted: 2024-07-08

## 2024-07-12 PROBLEM — N92.0 EXCESSIVE AND FREQUENT MENSTRUATION WITH REGULAR CYCLE: Chronic | Status: ACTIVE | Noted: 2024-07-08

## 2024-07-12 PROBLEM — K76.89 OTHER SPECIFIED DISEASES OF LIVER: Chronic | Status: ACTIVE | Noted: 2024-07-08

## 2024-07-12 PROCEDURE — 74177 CT ABD & PELVIS W/CONTRAST: CPT | Mod: 26

## 2024-07-12 PROCEDURE — 74177 CT ABD & PELVIS W/CONTRAST: CPT

## 2024-07-12 NOTE — ASU PATIENT PROFILE, ADULT - PRESSURE ULCER(S)
Patient is calling for the last 3-4 days she has been experiencing swelling in her right and left legs. She is also experiencing pain in the left leg. Patient would like to be seen there are no openings can she be added or what is recommended for patient?     Please call [phone] no

## 2024-07-12 NOTE — ASU PATIENT PROFILE, ADULT - FALL HARM RISK - UNIVERSAL INTERVENTIONS
Bed in lowest position, wheels locked, appropriate side rails in place/Call bell, personal items and telephone in reach/Instruct patient to call for assistance before getting out of bed or chair/Non-slip footwear when patient is out of bed/[place] to call system/Physically safe environment - no spills, clutter or unnecessary equipment/Purposeful Proactive Rounding/Room/bathroom lighting operational, light cord in reach

## 2024-07-12 NOTE — ASU PATIENT PROFILE, ADULT - NSICDXPASTMEDICALHX_GEN_ALL_CORE_FT
PAST MEDICAL HISTORY:  Anxiety     Anxiety     Childhood asthma     Cyst of spleen     Depression     Hepatic cyst     History of endometrial biopsy     Hypertension     Menorrhagia     Obesity     Polycystic kidney     Polycystic kidney disease     Sleep apnea     Uterine polyp

## 2024-07-14 ENCOUNTER — TRANSCRIPTION ENCOUNTER (OUTPATIENT)
Age: 54
End: 2024-07-14

## 2024-07-15 ENCOUNTER — RESULT REVIEW (OUTPATIENT)
Age: 54
End: 2024-07-15

## 2024-07-15 ENCOUNTER — APPOINTMENT (OUTPATIENT)
Dept: SURGICAL ONCOLOGY | Facility: HOSPITAL | Age: 54
End: 2024-07-15

## 2024-07-15 ENCOUNTER — INPATIENT (INPATIENT)
Facility: HOSPITAL | Age: 54
LOS: 2 days | Discharge: HOME | End: 2024-07-18
Attending: STUDENT IN AN ORGANIZED HEALTH CARE EDUCATION/TRAINING PROGRAM | Admitting: STUDENT IN AN ORGANIZED HEALTH CARE EDUCATION/TRAINING PROGRAM
Payer: COMMERCIAL

## 2024-07-15 VITALS
SYSTOLIC BLOOD PRESSURE: 153 MMHG | DIASTOLIC BLOOD PRESSURE: 82 MMHG | HEIGHT: 70.5 IN | RESPIRATION RATE: 16 BRPM | WEIGHT: 227.08 LBS | OXYGEN SATURATION: 100 % | HEART RATE: 70 BPM | TEMPERATURE: 98 F

## 2024-07-15 DIAGNOSIS — D73.89 OTHER DISEASES OF SPLEEN: ICD-10-CM

## 2024-07-15 DIAGNOSIS — R92.8 OTHER ABNORMAL AND INCONCLUSIVE FINDINGS ON DIAGNOSTIC IMAGING OF BREAST: ICD-10-CM

## 2024-07-15 DIAGNOSIS — Z98.890 OTHER SPECIFIED POSTPROCEDURAL STATES: Chronic | ICD-10-CM

## 2024-07-15 DIAGNOSIS — Z92.89 PERSONAL HISTORY OF OTHER MEDICAL TREATMENT: Chronic | ICD-10-CM

## 2024-07-15 LAB
ADD ON TEST-SPECIMEN IN LAB: SIGNIFICANT CHANGE UP
ALBUMIN SERPL ELPH-MCNC: 3.9 G/DL — SIGNIFICANT CHANGE UP (ref 3.3–5)
ALP SERPL-CCNC: 82 U/L — SIGNIFICANT CHANGE UP (ref 40–120)
ALT FLD-CCNC: 13 U/L — SIGNIFICANT CHANGE UP (ref 4–33)
ANION GAP SERPL CALC-SCNC: 16 MMOL/L — HIGH (ref 7–14)
APTT BLD: 28.8 SEC — SIGNIFICANT CHANGE UP (ref 24.5–35.6)
AST SERPL-CCNC: 19 U/L — SIGNIFICANT CHANGE UP (ref 4–32)
BILIRUB DIRECT SERPL-MCNC: <0.2 MG/DL — SIGNIFICANT CHANGE UP (ref 0–0.3)
BILIRUB INDIRECT FLD-MCNC: SIGNIFICANT CHANGE UP MG/DL (ref 0–1)
BILIRUB SERPL-MCNC: <0.2 MG/DL — SIGNIFICANT CHANGE UP (ref 0.2–1.2)
BUN SERPL-MCNC: 16 MG/DL — SIGNIFICANT CHANGE UP (ref 7–23)
CALCIUM SERPL-MCNC: 8.8 MG/DL — SIGNIFICANT CHANGE UP (ref 8.4–10.5)
CHLORIDE SERPL-SCNC: 103 MMOL/L — SIGNIFICANT CHANGE UP (ref 98–107)
CO2 SERPL-SCNC: 20 MMOL/L — LOW (ref 22–31)
CREAT SERPL-MCNC: 0.75 MG/DL — SIGNIFICANT CHANGE UP (ref 0.5–1.3)
EGFR: 96 ML/MIN/1.73M2 — SIGNIFICANT CHANGE UP
GAS PNL BLDA: SIGNIFICANT CHANGE UP
GLUCOSE SERPL-MCNC: 174 MG/DL — HIGH (ref 70–99)
HCG UR QL: NEGATIVE — SIGNIFICANT CHANGE UP
HCT VFR BLD CALC: 36.4 % — SIGNIFICANT CHANGE UP (ref 34.5–45)
HGB BLD-MCNC: 11 G/DL — LOW (ref 11.5–15.5)
INR BLD: 1.16 RATIO — SIGNIFICANT CHANGE UP (ref 0.85–1.18)
MAGNESIUM SERPL-MCNC: 1.8 MG/DL — SIGNIFICANT CHANGE UP (ref 1.6–2.6)
MCHC RBC-ENTMCNC: 22.4 PG — LOW (ref 27–34)
MCHC RBC-ENTMCNC: 30.2 GM/DL — LOW (ref 32–36)
MCV RBC AUTO: 74.1 FL — LOW (ref 80–100)
NRBC # BLD: 0 /100 WBCS — SIGNIFICANT CHANGE UP (ref 0–0)
NRBC # FLD: 0 K/UL — SIGNIFICANT CHANGE UP (ref 0–0)
PHOSPHATE SERPL-MCNC: 2.7 MG/DL — SIGNIFICANT CHANGE UP (ref 2.5–4.5)
PLATELET # BLD AUTO: 262 K/UL — SIGNIFICANT CHANGE UP (ref 150–400)
POTASSIUM SERPL-MCNC: 3.9 MMOL/L — SIGNIFICANT CHANGE UP (ref 3.5–5.3)
POTASSIUM SERPL-SCNC: 3.9 MMOL/L — SIGNIFICANT CHANGE UP (ref 3.5–5.3)
PROT SERPL-MCNC: 6.9 G/DL — SIGNIFICANT CHANGE UP (ref 6–8.3)
PROTHROM AB SERPL-ACNC: 13 SEC — SIGNIFICANT CHANGE UP (ref 9.5–13)
RBC # BLD: 4.91 M/UL — SIGNIFICANT CHANGE UP (ref 3.8–5.2)
RBC # FLD: 15.4 % — HIGH (ref 10.3–14.5)
SODIUM SERPL-SCNC: 139 MMOL/L — SIGNIFICANT CHANGE UP (ref 135–145)
WBC # BLD: 19.75 K/UL — HIGH (ref 3.8–10.5)
WBC # FLD AUTO: 19.75 K/UL — HIGH (ref 3.8–10.5)

## 2024-07-15 PROCEDURE — S2900 ROBOTIC SURGICAL SYSTEM: CPT | Mod: NC

## 2024-07-15 PROCEDURE — 88189 FLOWCYTOMETRY/READ 16 & >: CPT

## 2024-07-15 PROCEDURE — 88342 IMHCHEM/IMCYTCHM 1ST ANTB: CPT | Mod: 26

## 2024-07-15 PROCEDURE — 38120 LAPAROSCOPY SPLENECTOMY: CPT

## 2024-07-15 PROCEDURE — 47300 SURGERY FOR LIVER LESION: CPT

## 2024-07-15 PROCEDURE — 88307 TISSUE EXAM BY PATHOLOGIST: CPT | Mod: 26

## 2024-07-15 PROCEDURE — 88341 IMHCHEM/IMCYTCHM EA ADD ANTB: CPT | Mod: 26

## 2024-07-15 PROCEDURE — 88305 TISSUE EXAM BY PATHOLOGIST: CPT | Mod: 26

## 2024-07-15 PROCEDURE — S2900 ROBOTIC SURGICAL SYSTEM: CPT | Mod: NC,82

## 2024-07-15 PROCEDURE — 47300 SURGERY FOR LIVER LESION: CPT | Mod: 82

## 2024-07-15 PROCEDURE — 38120 LAPAROSCOPY SPLENECTOMY: CPT | Mod: 82

## 2024-07-15 DEVICE — VISTASEAL FIBRIN HUMAN 10ML: Type: IMPLANTABLE DEVICE | Status: FUNCTIONAL

## 2024-07-15 DEVICE — SURGICEL FIBRILLAR 2 X 4": Type: IMPLANTABLE DEVICE | Status: FUNCTIONAL

## 2024-07-15 DEVICE — XI STAPLER SUREFORM RELOAD 60 BLUE: Type: IMPLANTABLE DEVICE | Status: FUNCTIONAL

## 2024-07-15 DEVICE — SURGICEL 2 X 14": Type: IMPLANTABLE DEVICE | Status: FUNCTIONAL

## 2024-07-15 DEVICE — LIGATING CLIPS WECK HEMOLOK POLYMER LARGE (PURPLE) 6: Type: IMPLANTABLE DEVICE | Status: FUNCTIONAL

## 2024-07-15 DEVICE — LIGATING CLIPS WECK HORIZON MEDIUM (BLUE) 24: Type: IMPLANTABLE DEVICE | Status: FUNCTIONAL

## 2024-07-15 DEVICE — XI STAPLER SUREFORM RELOAD 60 WHITE: Type: IMPLANTABLE DEVICE | Status: FUNCTIONAL

## 2024-07-15 RX ORDER — HYDROXYZINE PAMOATE 50 MG/1
1 CAPSULE ORAL
Refills: 0 | DISCHARGE

## 2024-07-15 RX ORDER — OXYCODONE HYDROCHLORIDE 100 MG/5ML
5 SOLUTION ORAL ONCE
Refills: 0 | Status: DISCONTINUED | OUTPATIENT
Start: 2024-07-15 | End: 2024-07-16

## 2024-07-15 RX ORDER — POTASSIUM PHOSPHATE, MONOBASIC POTASSIUM PHOSPHATE, DIBASIC INJECTION, 236; 224 MG/ML; MG/ML
15 SOLUTION, CONCENTRATE INTRAVENOUS ONCE
Refills: 0 | Status: COMPLETED | OUTPATIENT
Start: 2024-07-15 | End: 2024-07-15

## 2024-07-15 RX ORDER — DEXTROSE MONOHYDRATE AND SODIUM CHLORIDE 5; .3 G/100ML; G/100ML
1000 INJECTION, SOLUTION INTRAVENOUS
Refills: 0 | Status: DISCONTINUED | OUTPATIENT
Start: 2024-07-15 | End: 2024-07-15

## 2024-07-15 RX ORDER — ACETAMINOPHEN 325 MG
1000 TABLET ORAL EVERY 6 HOURS
Refills: 0 | Status: COMPLETED | OUTPATIENT
Start: 2024-07-15 | End: 2024-07-16

## 2024-07-15 RX ORDER — TRANEXAMIC ACID 100 MG/ML
1 INJECTION, SOLUTION INTRAVENOUS
Refills: 0 | DISCHARGE

## 2024-07-15 RX ORDER — SERTRALINE HYDROCHLORIDE 100 MG/1
50 TABLET, FILM COATED ORAL DAILY
Refills: 0 | Status: DISCONTINUED | OUTPATIENT
Start: 2024-07-15 | End: 2024-07-18

## 2024-07-15 RX ORDER — OXYCODONE HYDROCHLORIDE 100 MG/5ML
5 SOLUTION ORAL EVERY 4 HOURS
Refills: 0 | Status: DISCONTINUED | OUTPATIENT
Start: 2024-07-15 | End: 2024-07-18

## 2024-07-15 RX ORDER — ACETAMINOPHEN 325 MG
1 TABLET ORAL
Refills: 0 | DISCHARGE

## 2024-07-15 RX ORDER — ONDANSETRON HYDROCHLORIDE 2 MG/ML
4 INJECTION INTRAMUSCULAR; INTRAVENOUS ONCE
Refills: 0 | Status: DISCONTINUED | OUTPATIENT
Start: 2024-07-15 | End: 2024-07-15

## 2024-07-15 RX ORDER — HYDROMORPHONE HCL 0.2 MG/ML
0.5 INJECTION, SOLUTION INTRAVENOUS
Refills: 0 | Status: DISCONTINUED | OUTPATIENT
Start: 2024-07-15 | End: 2024-07-16

## 2024-07-15 RX ORDER — OXYCODONE HYDROCHLORIDE 100 MG/5ML
2.5 SOLUTION ORAL EVERY 4 HOURS
Refills: 0 | Status: DISCONTINUED | OUTPATIENT
Start: 2024-07-15 | End: 2024-07-18

## 2024-07-15 RX ORDER — HYDROMORPHONE HCL 0.2 MG/ML
0.5 INJECTION, SOLUTION INTRAVENOUS EVERY 4 HOURS
Refills: 0 | Status: DISCONTINUED | OUTPATIENT
Start: 2024-07-15 | End: 2024-07-17

## 2024-07-15 RX ORDER — ONDANSETRON HYDROCHLORIDE 2 MG/ML
4 INJECTION INTRAMUSCULAR; INTRAVENOUS ONCE
Refills: 0 | Status: COMPLETED | OUTPATIENT
Start: 2024-07-15 | End: 2024-07-15

## 2024-07-15 RX ORDER — MAGNESIUM SULFATE 100 %
2 POWDER (GRAM) MISCELLANEOUS ONCE
Refills: 0 | Status: COMPLETED | OUTPATIENT
Start: 2024-07-15 | End: 2024-07-15

## 2024-07-15 RX ORDER — AMLODIPINE BESYLATE 2.5 MG/1
10 TABLET ORAL DAILY
Refills: 0 | Status: DISCONTINUED | OUTPATIENT
Start: 2024-07-16 | End: 2024-07-18

## 2024-07-15 RX ORDER — FENTANYL CITRATE 50 UG/ML
25 INJECTION, SOLUTION INTRAMUSCULAR; INTRAVENOUS
Refills: 0 | Status: DISCONTINUED | OUTPATIENT
Start: 2024-07-15 | End: 2024-07-15

## 2024-07-15 RX ADMIN — OXYCODONE HYDROCHLORIDE 5 MILLIGRAM(S): 100 SOLUTION ORAL at 21:10

## 2024-07-15 RX ADMIN — HYDROMORPHONE HCL 0.5 MILLIGRAM(S): 0.2 INJECTION, SOLUTION INTRAVENOUS at 15:05

## 2024-07-15 RX ADMIN — HYDROMORPHONE HCL 0.5 MILLIGRAM(S): 0.2 INJECTION, SOLUTION INTRAVENOUS at 13:30

## 2024-07-15 RX ADMIN — Medication 1000 MILLIGRAM(S): at 19:00

## 2024-07-15 RX ADMIN — HYDROMORPHONE HCL 0.5 MILLIGRAM(S): 0.2 INJECTION, SOLUTION INTRAVENOUS at 14:55

## 2024-07-15 RX ADMIN — ONDANSETRON HYDROCHLORIDE 4 MILLIGRAM(S): 2 INJECTION INTRAMUSCULAR; INTRAVENOUS at 20:41

## 2024-07-15 RX ADMIN — POTASSIUM PHOSPHATE, MONOBASIC POTASSIUM PHOSPHATE, DIBASIC INJECTION, 62.5 MILLIMOLE(S): 236; 224 SOLUTION, CONCENTRATE INTRAVENOUS at 14:05

## 2024-07-15 RX ADMIN — HYDROMORPHONE HCL 0.5 MILLIGRAM(S): 0.2 INJECTION, SOLUTION INTRAVENOUS at 13:40

## 2024-07-15 RX ADMIN — Medication 50 GRAM(S): at 14:04

## 2024-07-15 RX ADMIN — Medication 400 MILLIGRAM(S): at 18:17

## 2024-07-15 RX ADMIN — DEXTROSE MONOHYDRATE AND SODIUM CHLORIDE 75 MILLILITER(S): 5; .3 INJECTION, SOLUTION INTRAVENOUS at 13:00

## 2024-07-15 RX ADMIN — OXYCODONE HYDROCHLORIDE 5 MILLIGRAM(S): 100 SOLUTION ORAL at 20:41

## 2024-07-15 NOTE — CHART NOTE - NSCHARTNOTEFT_GEN_A_CORE
Post Operative Note  Patient: CHERELLE KOEHLER 53y (1970) Female   MRN: [id number]  Location: [facility] 07  Visit: 07-15-24 Inpatient  Date: 07-15-24 @ 15:45    Procedure: S/P Robot-assisted laparoscopic splenectomy and hepatic cyst fenestration    Subjective: Patient seen and examined post operatively. Reports pain in left shoulder and lower back. Denies nausea, vomiting, fever, chills, chest pain, SOB, cough.       Objective:  Vitals: T(F): 97.4 (07-15-24 @ 15:00), Max: 98.4 (07-15-24 @ 05:35)  HR: 72 (07-15-24 @ 15:00)  BP: 132/71 (07-15-24 @ 15:00) (104/64 - 153/82)  RR: 10 (07-15-24 @ 15:00)  SpO2: 100% (07-15-24 @ 15:00)  Vent Settings:     Physical Examination:  General: NAD, resting comfortably in bed  HEENT: Normocephalic atraumatic  Respiratory: Nonlabored respirations, normal CW expansion.  Abdomen: softly distended, appropriately tender, surgical incisions are c/d/i, no erythema nor signs of infection. Dressings are c/d/i. Drain output is 65 cc and serosanguinolent.   Vascular: extremities are warm and well perfused. some tenderness in left shoulder.    Imaging:  No post-op imaging studies    Assessment:  53yFemale patient S/P Robot-assisted laparoscopic splenectomy and hepatic cyst fenestration  for splenic mass and congenital cystic liver disease.     Plan:  - Follow-up PACU labs  - Pain: acetaminophen   IVPB ..; HYDROmorphone  Injectable PRN; ondansetron Injectable PRN; oxyCODONE    IR PRN  - Diet: Clear liquid diet   - IVF: LR @75 cc/hr  - Whipple- monitor UOP   - Activity: Ambulate as tolerated   - DVT ppx: SCD's     Date/Time: 07-15-24 @ 15:45

## 2024-07-15 NOTE — BRIEF OPERATIVE NOTE - NSICDXBRIEFPOSTOP_GEN_ALL_CORE_FT
POST-OP DIAGNOSIS:  Splenic mass 15-Jul-2024 13:03:35  Umu Allen  Congenital cystic liver disease 15-Jul-2024 13:03:44  Umu Allen

## 2024-07-15 NOTE — BRIEF OPERATIVE NOTE - NSICDXBRIEFPROCEDURE_GEN_ALL_CORE_FT
PROCEDURES:  Robot-assisted laparoscopic splenectomy 15-Jul-2024 13:02:55  Umu Allen  Robot-assisted laparoscopic fenestration of cyst of liver 15-Jul-2024 13:03:05  Umu Allen

## 2024-07-15 NOTE — PATIENT PROFILE ADULT - FALL HARM RISK - UNIVERSAL INTERVENTIONS
Bed in lowest position, wheels locked, appropriate side rails in place/Call bell, personal items and telephone in reach/Instruct patient to call for assistance before getting out of bed or chair/Non-slip footwear when patient is out of bed/[place] to call system/Physically safe environment - no spills, clutter or unnecessary equipment/Purposeful Proactive Rounding/Room/bathroom lighting operational, light cord in reach

## 2024-07-15 NOTE — BRIEF OPERATIVE NOTE - OPERATION/FINDINGS
Enlarged spleen with mass. Also enlarged liver with innumerable simple cysts. Robotic splenectomy performed. Splenic artery ligated with 2-0 silk ties at start prior to dissecting hilum. ICG used to check perfusion which showed additional area of perfusion in lower pole, fed by a small arterial branch which was controlled with vessel sealer. Hilum stapled with robotic stapler 60mm blue load then white load.    Several large anterior hepatic cysts fenestrated.    19Fr Stef drain placed in splenectomy bed by pancreatic tail.

## 2024-07-15 NOTE — PATIENT PROFILE ADULT - MST SCORE
[FreeTextEntry1] : Mr. 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 Highly suspicious for prostate cancer. Recommend prostate biopsy as soon as possible.  He will follow up with Dr. Briscoe. 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 \par -Will repeat CT CAP in the mid September. \par - Active surveillance now\par -Continue f/u with Dr. Fontenot\par \par #SCC of the L supraglottic larynx,s/p CT/RT in 2016, recurred in the right soft palate, operated in 1/2018 and completed RT in 4/ 2018\par -CHEY \par -Continue to f/u with Dr. Snow and Dr. Pandya. \par -Continue daily neck exercises \par \par # Health maintenance \par -Colonoscopy as per PCP\par -Received covid vaccine\par \par RTC in 2months after CT CAP
0

## 2024-07-15 NOTE — BRIEF OPERATIVE NOTE - NSICDXBRIEFPREOP_GEN_ALL_CORE_FT
PRE-OP DIAGNOSIS:  Splenic mass 15-Jul-2024 13:03:15  Umu Allen  Congenital cystic liver disease 15-Jul-2024 13:03:28  Umu Allen

## 2024-07-16 LAB
ADD ON TEST-SPECIMEN IN LAB: SIGNIFICANT CHANGE UP
ALBUMIN SERPL ELPH-MCNC: 4.1 G/DL — SIGNIFICANT CHANGE UP (ref 3.3–5)
ALP SERPL-CCNC: 87 U/L — SIGNIFICANT CHANGE UP (ref 40–120)
ALT FLD-CCNC: 12 U/L — SIGNIFICANT CHANGE UP (ref 4–33)
AMYLASE FLD-CCNC: 334 U/L — SIGNIFICANT CHANGE UP
AMYLASE P1 CFR SERPL: 60 U/L — SIGNIFICANT CHANGE UP (ref 25–125)
ANION GAP SERPL CALC-SCNC: 14 MMOL/L — SIGNIFICANT CHANGE UP (ref 7–14)
AST SERPL-CCNC: 19 U/L — SIGNIFICANT CHANGE UP (ref 4–32)
BILIRUB SERPL-MCNC: 0.4 MG/DL — SIGNIFICANT CHANGE UP (ref 0.2–1.2)
BUN SERPL-MCNC: 9 MG/DL — SIGNIFICANT CHANGE UP (ref 7–23)
CALCIUM SERPL-MCNC: 8.9 MG/DL — SIGNIFICANT CHANGE UP (ref 8.4–10.5)
CHLORIDE SERPL-SCNC: 105 MMOL/L — SIGNIFICANT CHANGE UP (ref 98–107)
CO2 SERPL-SCNC: 21 MMOL/L — LOW (ref 22–31)
CREAT SERPL-MCNC: 0.7 MG/DL — SIGNIFICANT CHANGE UP (ref 0.5–1.3)
EGFR: 105 ML/MIN/1.73M2 — SIGNIFICANT CHANGE UP
GLUCOSE BLDC GLUCOMTR-MCNC: 127 MG/DL — HIGH (ref 70–99)
GLUCOSE SERPL-MCNC: 97 MG/DL — SIGNIFICANT CHANGE UP (ref 70–99)
HCT VFR BLD CALC: 37.9 % — SIGNIFICANT CHANGE UP (ref 34.5–45)
HGB BLD-MCNC: 11.7 G/DL — SIGNIFICANT CHANGE UP (ref 11.5–15.5)
MAGNESIUM SERPL-MCNC: 2.4 MG/DL — SIGNIFICANT CHANGE UP (ref 1.6–2.6)
MCHC RBC-ENTMCNC: 22.6 PG — LOW (ref 27–34)
MCHC RBC-ENTMCNC: 30.9 GM/DL — LOW (ref 32–36)
MCV RBC AUTO: 73.3 FL — LOW (ref 80–100)
NRBC # BLD: 0 /100 WBCS — SIGNIFICANT CHANGE UP (ref 0–0)
NRBC # FLD: 0 K/UL — SIGNIFICANT CHANGE UP (ref 0–0)
PHOSPHATE SERPL-MCNC: 3.2 MG/DL — SIGNIFICANT CHANGE UP (ref 2.5–4.5)
PLATELET # BLD AUTO: 290 K/UL — SIGNIFICANT CHANGE UP (ref 150–400)
POTASSIUM SERPL-MCNC: 4 MMOL/L — SIGNIFICANT CHANGE UP (ref 3.5–5.3)
POTASSIUM SERPL-SCNC: 4 MMOL/L — SIGNIFICANT CHANGE UP (ref 3.5–5.3)
PROT SERPL-MCNC: 7.4 G/DL — SIGNIFICANT CHANGE UP (ref 6–8.3)
RBC # BLD: 5.17 M/UL — SIGNIFICANT CHANGE UP (ref 3.8–5.2)
RBC # FLD: 15.8 % — HIGH (ref 10.3–14.5)
SODIUM SERPL-SCNC: 140 MMOL/L — SIGNIFICANT CHANGE UP (ref 135–145)
WBC # BLD: 23.12 K/UL — HIGH (ref 3.8–10.5)
WBC # FLD AUTO: 23.12 K/UL — HIGH (ref 3.8–10.5)

## 2024-07-16 RX ORDER — ACETAMINOPHEN 325 MG
1000 TABLET ORAL EVERY 6 HOURS
Refills: 0 | Status: DISCONTINUED | OUTPATIENT
Start: 2024-07-16 | End: 2024-07-18

## 2024-07-16 RX ORDER — METHOCARBAMOL 500 MG
500 TABLET ORAL EVERY 8 HOURS
Refills: 0 | Status: DISCONTINUED | OUTPATIENT
Start: 2024-07-16 | End: 2024-07-18

## 2024-07-16 RX ORDER — PNEUMOCOCCAL 20-VALENT CONJUGATE VACCINE 2.2; 2.2; 2.2; 2.2; 2.2; 2.2; 2.2; 2.2; 2.2; 2.2; 2.2; 2.2; 2.2; 2.2; 2.2; 2.2; 4.4; 2.2; 2.2; 2.2 UG/.5ML; UG/.5ML; UG/.5ML; UG/.5ML; UG/.5ML; UG/.5ML; UG/.5ML; UG/.5ML; UG/.5ML; UG/.5ML; UG/.5ML; UG/.5ML; UG/.5ML; UG/.5ML; UG/.5ML; UG/.5ML; UG/.5ML; UG/.5ML; UG/.5ML; UG/.5ML
0.5 INJECTION, SUSPENSION INTRAMUSCULAR ONCE
Refills: 0 | Status: COMPLETED | OUTPATIENT
Start: 2024-07-16 | End: 2025-06-14

## 2024-07-16 RX ORDER — KETOROLAC TROMETHAMINE 30 MG/ML
15 INJECTION, SOLUTION INTRAMUSCULAR ONCE
Refills: 0 | Status: DISCONTINUED | OUTPATIENT
Start: 2024-07-16 | End: 2024-07-16

## 2024-07-16 RX ORDER — LORAZEPAM 0.5 MG
0.5 TABLET ORAL ONCE
Refills: 0 | Status: DISCONTINUED | OUTPATIENT
Start: 2024-07-16 | End: 2024-07-16

## 2024-07-16 RX ORDER — HAEMOPH B POLY CONJ-TET TOX/PF 10 MCG/0.5
0.5 VIAL (EA) INTRAMUSCULAR ONCE
Refills: 0 | Status: COMPLETED | OUTPATIENT
Start: 2024-07-16 | End: 2025-06-14

## 2024-07-16 RX ORDER — HEPARIN SODIUM 50 [USP'U]/ML
5000 INJECTION, SOLUTION INTRAVENOUS EVERY 8 HOURS
Refills: 0 | Status: DISCONTINUED | OUTPATIENT
Start: 2024-07-16 | End: 2024-07-18

## 2024-07-16 RX ORDER — NEISSERIA MENINGITIDIS SEROGROUP B NHBA FUSION PROTEIN ANTIGEN, NEISSERIA MENINGITIDIS SEROGROUP B FHBP FUSION PROTEIN ANTIGEN AND NEISSERIA MENINGITIDIS SEROGROUP B NADA PROTEIN ANTIGEN 50; 50; 50; 25 UG/.5ML; UG/.5ML; UG/.5ML; UG/.5ML
0.5 INJECTION, SUSPENSION INTRAMUSCULAR ONCE
Refills: 0 | Status: COMPLETED | OUTPATIENT
Start: 2024-07-16 | End: 2025-06-14

## 2024-07-16 RX ORDER — LIDOCAINE HCL 28 MG/G
1 GEL TOPICAL ONCE
Refills: 0 | Status: COMPLETED | OUTPATIENT
Start: 2024-07-16 | End: 2024-07-16

## 2024-07-16 RX ORDER — NEISSERIA MENINGITIDIS GROUP A CAPSULAR POLYSACCHARIDE DIPHTHERIA TOXOID CONJUGATE ANTIGEN, NEISSERIA MENINGITIDIS GROUP C CAPSULAR POLYSACCHARIDE DIPHTHERIA TOXOID CONJUGATE ANTIGEN, NEISSERIA MENINGITIDIS GROUP Y CAPSULAR POLYSACCHARIDE DIPHTHERIA TOXOID CONJUGATE ANTIGEN, AND NEISSERIA MENINGITIDIS GROUP W-135 CAPSULAR POLYSACCHARIDE DIPHTHERIA TOXOID CONJUGATE ANTIGEN 4; 4; 4; 4 UG/.5ML; UG/.5ML; UG/.5ML; UG/.5ML
0.5 INJECTION, SOLUTION INTRAMUSCULAR ONCE
Refills: 0 | Status: COMPLETED | OUTPATIENT
Start: 2024-07-16 | End: 2025-06-14

## 2024-07-16 RX ORDER — DIPHENHYDRAMINE HCL 12.5MG/5ML
25 ELIXIR ORAL ONCE
Refills: 0 | Status: DISCONTINUED | OUTPATIENT
Start: 2024-07-16 | End: 2024-07-17

## 2024-07-16 RX ADMIN — Medication 500 MILLIGRAM(S): at 09:47

## 2024-07-16 RX ADMIN — AMLODIPINE BESYLATE 10 MILLIGRAM(S): 2.5 TABLET ORAL at 04:38

## 2024-07-16 RX ADMIN — Medication 0.5 MILLIGRAM(S): at 01:14

## 2024-07-16 RX ADMIN — Medication 500 MILLIGRAM(S): at 21:12

## 2024-07-16 RX ADMIN — LIDOCAINE HCL 1 PATCH: 28 GEL TOPICAL at 21:18

## 2024-07-16 RX ADMIN — Medication 1000 MILLIGRAM(S): at 06:45

## 2024-07-16 RX ADMIN — Medication 400 MILLIGRAM(S): at 12:13

## 2024-07-16 RX ADMIN — Medication 400 MILLIGRAM(S): at 00:38

## 2024-07-16 RX ADMIN — Medication 1000 MILLIGRAM(S): at 21:45

## 2024-07-16 RX ADMIN — HEPARIN SODIUM 5000 UNIT(S): 50 INJECTION, SOLUTION INTRAVENOUS at 17:50

## 2024-07-16 RX ADMIN — Medication 1000 MILLIGRAM(S): at 01:10

## 2024-07-16 RX ADMIN — Medication 400 MILLIGRAM(S): at 05:54

## 2024-07-16 RX ADMIN — SERTRALINE HYDROCHLORIDE 50 MILLIGRAM(S): 100 TABLET, FILM COATED ORAL at 12:13

## 2024-07-16 RX ADMIN — OXYCODONE HYDROCHLORIDE 5 MILLIGRAM(S): 100 SOLUTION ORAL at 05:10

## 2024-07-16 RX ADMIN — OXYCODONE HYDROCHLORIDE 5 MILLIGRAM(S): 100 SOLUTION ORAL at 04:38

## 2024-07-16 RX ADMIN — Medication 1000 MILLIGRAM(S): at 21:16

## 2024-07-16 RX ADMIN — KETOROLAC TROMETHAMINE 15 MILLIGRAM(S): 30 INJECTION, SOLUTION INTRAMUSCULAR at 09:42

## 2024-07-17 LAB
ALBUMIN SERPL ELPH-MCNC: 3.8 G/DL — SIGNIFICANT CHANGE UP (ref 3.3–5)
ALP SERPL-CCNC: 91 U/L — SIGNIFICANT CHANGE UP (ref 40–120)
ALT FLD-CCNC: 15 U/L — SIGNIFICANT CHANGE UP (ref 4–33)
AMYLASE FLD-CCNC: 73 U/L — SIGNIFICANT CHANGE UP
ANION GAP SERPL CALC-SCNC: 10 MMOL/L — SIGNIFICANT CHANGE UP (ref 7–14)
AST SERPL-CCNC: 19 U/L — SIGNIFICANT CHANGE UP (ref 4–32)
BILIRUB DIRECT SERPL-MCNC: <0.2 MG/DL — SIGNIFICANT CHANGE UP (ref 0–0.3)
BILIRUB INDIRECT FLD-MCNC: >0.1 MG/DL — SIGNIFICANT CHANGE UP (ref 0–1)
BILIRUB SERPL-MCNC: 0.3 MG/DL — SIGNIFICANT CHANGE UP (ref 0.2–1.2)
BUN SERPL-MCNC: 10 MG/DL — SIGNIFICANT CHANGE UP (ref 7–23)
CALCIUM SERPL-MCNC: 9 MG/DL — SIGNIFICANT CHANGE UP (ref 8.4–10.5)
CHLORIDE SERPL-SCNC: 103 MMOL/L — SIGNIFICANT CHANGE UP (ref 98–107)
CO2 SERPL-SCNC: 25 MMOL/L — SIGNIFICANT CHANGE UP (ref 22–31)
CREAT SERPL-MCNC: 0.67 MG/DL — SIGNIFICANT CHANGE UP (ref 0.5–1.3)
EGFR: 106 ML/MIN/1.73M2 — SIGNIFICANT CHANGE UP
GLUCOSE BLDC GLUCOMTR-MCNC: 126 MG/DL — HIGH (ref 70–99)
GLUCOSE SERPL-MCNC: 118 MG/DL — HIGH (ref 70–99)
HCT VFR BLD CALC: 39.6 % — SIGNIFICANT CHANGE UP (ref 34.5–45)
HGB BLD-MCNC: 12 G/DL — SIGNIFICANT CHANGE UP (ref 11.5–15.5)
MAGNESIUM SERPL-MCNC: 2.1 MG/DL — SIGNIFICANT CHANGE UP (ref 1.6–2.6)
MCHC RBC-ENTMCNC: 22.5 PG — LOW (ref 27–34)
MCHC RBC-ENTMCNC: 30.3 GM/DL — LOW (ref 32–36)
MCV RBC AUTO: 74.3 FL — LOW (ref 80–100)
NRBC # BLD: 0 /100 WBCS — SIGNIFICANT CHANGE UP (ref 0–0)
NRBC # FLD: 0 K/UL — SIGNIFICANT CHANGE UP (ref 0–0)
PHOSPHATE SERPL-MCNC: 2.2 MG/DL — LOW (ref 2.5–4.5)
PLATELET # BLD AUTO: 349 K/UL — SIGNIFICANT CHANGE UP (ref 150–400)
POTASSIUM SERPL-MCNC: 3.6 MMOL/L — SIGNIFICANT CHANGE UP (ref 3.5–5.3)
POTASSIUM SERPL-SCNC: 3.6 MMOL/L — SIGNIFICANT CHANGE UP (ref 3.5–5.3)
PROT SERPL-MCNC: 7.2 G/DL — SIGNIFICANT CHANGE UP (ref 6–8.3)
RBC # BLD: 5.33 M/UL — HIGH (ref 3.8–5.2)
RBC # FLD: 16 % — HIGH (ref 10.3–14.5)
SODIUM SERPL-SCNC: 138 MMOL/L — SIGNIFICANT CHANGE UP (ref 135–145)
WBC # BLD: 22.72 K/UL — HIGH (ref 3.8–10.5)
WBC # FLD AUTO: 22.72 K/UL — HIGH (ref 3.8–10.5)

## 2024-07-17 RX ORDER — POLYETHYLENE GLYCOL 3350 1 G/G
17 POWDER ORAL DAILY
Refills: 0 | Status: DISCONTINUED | OUTPATIENT
Start: 2024-07-17 | End: 2024-07-18

## 2024-07-17 RX ORDER — SOD PHOS DI, MONO/K PHOS MONO 250 MG
1 TABLET ORAL THREE TIMES A DAY
Refills: 0 | Status: COMPLETED | OUTPATIENT
Start: 2024-07-17 | End: 2024-07-18

## 2024-07-17 RX ORDER — DIPHENHYDRAMINE HCL 12.5MG/5ML
25 ELIXIR ORAL ONCE
Refills: 0 | Status: DISCONTINUED | OUTPATIENT
Start: 2024-07-17 | End: 2024-07-18

## 2024-07-17 RX ADMIN — Medication 1000 MILLIGRAM(S): at 09:14

## 2024-07-17 RX ADMIN — Medication 1000 MILLIGRAM(S): at 17:25

## 2024-07-17 RX ADMIN — Medication 1000 MILLIGRAM(S): at 03:17

## 2024-07-17 RX ADMIN — AMLODIPINE BESYLATE 10 MILLIGRAM(S): 2.5 TABLET ORAL at 05:29

## 2024-07-17 RX ADMIN — Medication 1000 MILLIGRAM(S): at 17:55

## 2024-07-17 RX ADMIN — OXYCODONE HYDROCHLORIDE 2.5 MILLIGRAM(S): 100 SOLUTION ORAL at 11:29

## 2024-07-17 RX ADMIN — Medication 1 TABLET(S): at 17:36

## 2024-07-17 RX ADMIN — SERTRALINE HYDROCHLORIDE 50 MILLIGRAM(S): 100 TABLET, FILM COATED ORAL at 11:29

## 2024-07-17 RX ADMIN — Medication 1000 MILLIGRAM(S): at 09:44

## 2024-07-17 RX ADMIN — HEPARIN SODIUM 5000 UNIT(S): 50 INJECTION, SOLUTION INTRAVENOUS at 09:20

## 2024-07-17 RX ADMIN — OXYCODONE HYDROCHLORIDE 5 MILLIGRAM(S): 100 SOLUTION ORAL at 00:45

## 2024-07-17 RX ADMIN — POLYETHYLENE GLYCOL 3350 17 GRAM(S): 1 POWDER ORAL at 09:19

## 2024-07-17 RX ADMIN — OXYCODONE HYDROCHLORIDE 2.5 MILLIGRAM(S): 100 SOLUTION ORAL at 11:59

## 2024-07-17 RX ADMIN — Medication 1000 MILLIGRAM(S): at 03:54

## 2024-07-17 RX ADMIN — OXYCODONE HYDROCHLORIDE 5 MILLIGRAM(S): 100 SOLUTION ORAL at 00:12

## 2024-07-17 RX ADMIN — Medication 500 MILLIGRAM(S): at 05:29

## 2024-07-17 RX ADMIN — HEPARIN SODIUM 5000 UNIT(S): 50 INJECTION, SOLUTION INTRAVENOUS at 00:11

## 2024-07-17 RX ADMIN — HEPARIN SODIUM 5000 UNIT(S): 50 INJECTION, SOLUTION INTRAVENOUS at 17:26

## 2024-07-17 RX ADMIN — Medication 1 TABLET(S): at 21:47

## 2024-07-17 NOTE — PHYSICAL THERAPY INITIAL EVALUATION ADULT - PATIENT PROFILE REVIEW, REHAB EVAL
ACTIVITY ORDER: Ambulate as Tolerated; Spoke with KAIN Coon prior to PT evaluation-->Pt OK for PT consult/OOB activity./yes

## 2024-07-17 NOTE — PHYSICAL THERAPY INITIAL EVALUATION ADULT - NSPTDISCHREC_GEN_A_CORE
will keep pt on PT program while at [facility] to prevent weakness/deconditioning./No skilled PT needs

## 2024-07-17 NOTE — PHYSICAL THERAPY INITIAL EVALUATION ADULT - ACTIVE RANGE OF MOTION EXAMINATION, REHAB EVAL
charlie. upper extremity Active ROM was WNL (within normal limits)/bilateral lower extremity Active ROM was WNL (within normal limits)

## 2024-07-17 NOTE — PHYSICAL THERAPY INITIAL EVALUATION ADULT - GENERAL OBSERVATIONS, REHAB EVAL
Pt encountered in semisupine position, no distress, AxOx4, with +IV. Pt agreeable to participate in PT evaluation. Vitals taken; /58mmHg, heart rate 85bpm.

## 2024-07-17 NOTE — PHYSICAL THERAPY INITIAL EVALUATION ADULT - ADDITIONAL COMMENTS
Pt lives alone in a co-op apartment with 5 +5 steps to enter; (+)handrail/s. Prior to hospital admission, pt was completely independent and used no assistive device with ambulation. Pt denies any recent falls.    Pt left comfortable in bed, NAD, all lines intact, all precautions maintained, with call bell in reach, and RN aware of PT evaluation.

## 2024-07-17 NOTE — PHYSICAL THERAPY INITIAL EVALUATION ADULT - WEIGHT-BEARING RESTRICTIONS: SIT/STAND, REHAB EVAL
Can you please schedule Sriram for his next eye exam?  I can fit him with contact lenses at that time.  His last exam was March 2021   full weight-bearing

## 2024-07-17 NOTE — PHYSICAL THERAPY INITIAL EVALUATION ADULT - PERTINENT HX OF CURRENT PROBLEM, REHAB EVAL
53 yr old female, presents to [facility], with pre op diagnosis of splenic mass, for pre op evaluation prior to scheduled for Robotic Splenectomy Possible Hepatic Cyst Fenestration with Dr Marte tentatively on 07/15/24.  history of polycystic kidney disease, with an incidental finding of a 8 cm splenic mass seen since 2022 with some enlargement since her last MRI.

## 2024-07-18 ENCOUNTER — TRANSCRIPTION ENCOUNTER (OUTPATIENT)
Age: 54
End: 2024-07-18

## 2024-07-18 VITALS
DIASTOLIC BLOOD PRESSURE: 53 MMHG | HEART RATE: 88 BPM | OXYGEN SATURATION: 100 % | TEMPERATURE: 99 F | SYSTOLIC BLOOD PRESSURE: 145 MMHG | RESPIRATION RATE: 18 BRPM

## 2024-07-18 LAB
ALBUMIN SERPL ELPH-MCNC: 3.7 G/DL — SIGNIFICANT CHANGE UP (ref 3.3–5)
ALP SERPL-CCNC: 100 U/L — SIGNIFICANT CHANGE UP (ref 40–120)
ALT FLD-CCNC: 21 U/L — SIGNIFICANT CHANGE UP (ref 4–33)
ANION GAP SERPL CALC-SCNC: 9 MMOL/L — SIGNIFICANT CHANGE UP (ref 7–14)
AST SERPL-CCNC: 23 U/L — SIGNIFICANT CHANGE UP (ref 4–32)
BILIRUB SERPL-MCNC: 0.3 MG/DL — SIGNIFICANT CHANGE UP (ref 0.2–1.2)
BUN SERPL-MCNC: 12 MG/DL — SIGNIFICANT CHANGE UP (ref 7–23)
CALCIUM SERPL-MCNC: 9.2 MG/DL — SIGNIFICANT CHANGE UP (ref 8.4–10.5)
CHLORIDE SERPL-SCNC: 103 MMOL/L — SIGNIFICANT CHANGE UP (ref 98–107)
CO2 SERPL-SCNC: 28 MMOL/L — SIGNIFICANT CHANGE UP (ref 22–31)
CREAT SERPL-MCNC: 0.65 MG/DL — SIGNIFICANT CHANGE UP (ref 0.5–1.3)
EGFR: 107 ML/MIN/1.73M2 — SIGNIFICANT CHANGE UP
GLUCOSE SERPL-MCNC: 93 MG/DL — SIGNIFICANT CHANGE UP (ref 70–99)
HCT VFR BLD CALC: 38.5 % — SIGNIFICANT CHANGE UP (ref 34.5–45)
HGB BLD-MCNC: 11.7 G/DL — SIGNIFICANT CHANGE UP (ref 11.5–15.5)
MAGNESIUM SERPL-MCNC: 2.1 MG/DL — SIGNIFICANT CHANGE UP (ref 1.6–2.6)
MCHC RBC-ENTMCNC: 22.7 PG — LOW (ref 27–34)
MCHC RBC-ENTMCNC: 30.4 GM/DL — LOW (ref 32–36)
MCV RBC AUTO: 74.6 FL — LOW (ref 80–100)
NRBC # BLD: 0 /100 WBCS — SIGNIFICANT CHANGE UP (ref 0–0)
NRBC # FLD: 0 K/UL — SIGNIFICANT CHANGE UP (ref 0–0)
PHOSPHATE SERPL-MCNC: 3.5 MG/DL — SIGNIFICANT CHANGE UP (ref 2.5–4.5)
PLATELET # BLD AUTO: 425 K/UL — HIGH (ref 150–400)
POTASSIUM SERPL-MCNC: 3.8 MMOL/L — SIGNIFICANT CHANGE UP (ref 3.5–5.3)
POTASSIUM SERPL-SCNC: 3.8 MMOL/L — SIGNIFICANT CHANGE UP (ref 3.5–5.3)
PROT SERPL-MCNC: 7.2 G/DL — SIGNIFICANT CHANGE UP (ref 6–8.3)
RBC # BLD: 5.16 M/UL — SIGNIFICANT CHANGE UP (ref 3.8–5.2)
RBC # FLD: 15.9 % — HIGH (ref 10.3–14.5)
SODIUM SERPL-SCNC: 140 MMOL/L — SIGNIFICANT CHANGE UP (ref 135–145)
TM INTERPRETATION: SIGNIFICANT CHANGE UP
WBC # BLD: 20.26 K/UL — HIGH (ref 3.8–10.5)
WBC # FLD AUTO: 20.26 K/UL — HIGH (ref 3.8–10.5)

## 2024-07-18 RX ORDER — PNEUMOCOCCAL 20-VALENT CONJUGATE VACCINE 2.2; 2.2; 2.2; 2.2; 2.2; 2.2; 2.2; 2.2; 2.2; 2.2; 2.2; 2.2; 2.2; 2.2; 2.2; 2.2; 4.4; 2.2; 2.2; 2.2 UG/.5ML; UG/.5ML; UG/.5ML; UG/.5ML; UG/.5ML; UG/.5ML; UG/.5ML; UG/.5ML; UG/.5ML; UG/.5ML; UG/.5ML; UG/.5ML; UG/.5ML; UG/.5ML; UG/.5ML; UG/.5ML; UG/.5ML; UG/.5ML; UG/.5ML; UG/.5ML
0.5 INJECTION, SUSPENSION INTRAMUSCULAR ONCE
Refills: 0 | Status: COMPLETED | OUTPATIENT
Start: 2024-07-18 | End: 2024-07-18

## 2024-07-18 RX ORDER — NEISSERIA MENINGITIDIS GROUP A CAPSULAR POLYSACCHARIDE DIPHTHERIA TOXOID CONJUGATE ANTIGEN, NEISSERIA MENINGITIDIS GROUP C CAPSULAR POLYSACCHARIDE DIPHTHERIA TOXOID CONJUGATE ANTIGEN, NEISSERIA MENINGITIDIS GROUP Y CAPSULAR POLYSACCHARIDE DIPHTHERIA TOXOID CONJUGATE ANTIGEN, AND NEISSERIA MENINGITIDIS GROUP W-135 CAPSULAR POLYSACCHARIDE DIPHTHERIA TOXOID CONJUGATE ANTIGEN 4; 4; 4; 4 UG/.5ML; UG/.5ML; UG/.5ML; UG/.5ML
0.5 INJECTION, SOLUTION INTRAMUSCULAR ONCE
Refills: 0 | Status: COMPLETED | OUTPATIENT
Start: 2024-07-18 | End: 2024-07-18

## 2024-07-18 RX ORDER — LIDOCAINE HCL 28 MG/G
1 GEL TOPICAL
Qty: 7 | Refills: 0
Start: 2024-07-18 | End: 2024-07-24

## 2024-07-18 RX ORDER — LIDOCAINE HCL 28 MG/G
1 GEL TOPICAL EVERY 24 HOURS
Refills: 0 | Status: DISCONTINUED | OUTPATIENT
Start: 2024-07-18 | End: 2024-07-18

## 2024-07-18 RX ORDER — OXYCODONE HYDROCHLORIDE 100 MG/5ML
1 SOLUTION ORAL
Qty: 20 | Refills: 0
Start: 2024-07-18 | End: 2024-07-22

## 2024-07-18 RX ORDER — NEISSERIA MENINGITIDIS SEROGROUP B NHBA FUSION PROTEIN ANTIGEN, NEISSERIA MENINGITIDIS SEROGROUP B FHBP FUSION PROTEIN ANTIGEN AND NEISSERIA MENINGITIDIS SEROGROUP B NADA PROTEIN ANTIGEN 50; 50; 50; 25 UG/.5ML; UG/.5ML; UG/.5ML; UG/.5ML
0.5 INJECTION, SUSPENSION INTRAMUSCULAR ONCE
Refills: 0 | Status: COMPLETED | OUTPATIENT
Start: 2024-07-18 | End: 2024-07-18

## 2024-07-18 RX ORDER — POLYETHYLENE GLYCOL 3350 1 G/G
17 POWDER ORAL
Qty: 0 | Refills: 0 | DISCHARGE
Start: 2024-07-18

## 2024-07-18 RX ORDER — HAEMOPH B POLY CONJ-TET TOX/PF 10 MCG/0.5
0.5 VIAL (EA) INTRAMUSCULAR ONCE
Refills: 0 | Status: COMPLETED | OUTPATIENT
Start: 2024-07-18 | End: 2024-07-18

## 2024-07-18 RX ADMIN — HEPARIN SODIUM 5000 UNIT(S): 50 INJECTION, SOLUTION INTRAVENOUS at 09:27

## 2024-07-18 RX ADMIN — AMLODIPINE BESYLATE 10 MILLIGRAM(S): 2.5 TABLET ORAL at 05:23

## 2024-07-18 RX ADMIN — PNEUMOCOCCAL 20-VALENT CONJUGATE VACCINE 0.5 MILLILITER(S)
2.2; 2.2; 2.2; 2.2; 2.2; 2.2; 2.2; 2.2; 2.2; 2.2; 2.2; 2.2; 2.2; 2.2; 2.2; 2.2; 4.4; 2.2; 2.2; 2.2 INJECTION, SUSPENSION INTRAMUSCULAR at 17:23

## 2024-07-18 RX ADMIN — POLYETHYLENE GLYCOL 3350 17 GRAM(S): 1 POWDER ORAL at 12:47

## 2024-07-18 RX ADMIN — SERTRALINE HYDROCHLORIDE 50 MILLIGRAM(S): 100 TABLET, FILM COATED ORAL at 12:47

## 2024-07-18 RX ADMIN — Medication 1000 MILLIGRAM(S): at 12:48

## 2024-07-18 RX ADMIN — NEISSERIA MENINGITIDIS GROUP A CAPSULAR POLYSACCHARIDE DIPHTHERIA TOXOID CONJUGATE ANTIGEN, NEISSERIA MENINGITIDIS GROUP C CAPSULAR POLYSACCHARIDE DIPHTHERIA TOXOID CONJUGATE ANTIGEN, NEISSERIA MENINGITIDIS GROUP Y CAPSULAR POLYSACCHARIDE DIPHTHERIA TOXOID CONJUGATE ANTIGEN, AND NEISSERIA MENINGITIDIS GROUP W-135 CAPSULAR POLYSACCHARIDE DIPHTHERIA TOXOID CONJUGATE ANTIGEN 0.5 MILLILITER(S): 4; 4; 4; 4 INJECTION, SOLUTION INTRAMUSCULAR at 17:06

## 2024-07-18 RX ADMIN — NEISSERIA MENINGITIDIS SEROGROUP B NHBA FUSION PROTEIN ANTIGEN, NEISSERIA MENINGITIDIS SEROGROUP B FHBP FUSION PROTEIN ANTIGEN AND NEISSERIA MENINGITIDIS SEROGROUP B NADA PROTEIN ANTIGEN 0.5 MILLILITER(S): 50; 50; 50; 25 INJECTION, SUSPENSION INTRAMUSCULAR at 17:18

## 2024-07-18 RX ADMIN — LIDOCAINE HCL 1 PATCH: 28 GEL TOPICAL at 12:48

## 2024-07-18 RX ADMIN — HEPARIN SODIUM 5000 UNIT(S): 50 INJECTION, SOLUTION INTRAVENOUS at 00:24

## 2024-07-18 RX ADMIN — Medication 1000 MILLIGRAM(S): at 05:23

## 2024-07-18 RX ADMIN — Medication 1 TABLET(S): at 05:22

## 2024-07-18 RX ADMIN — Medication 1000 MILLIGRAM(S): at 01:00

## 2024-07-18 RX ADMIN — Medication 1000 MILLIGRAM(S): at 00:24

## 2024-07-18 RX ADMIN — Medication 0.5 MILLILITER(S): at 17:12

## 2024-07-18 NOTE — DISCHARGE NOTE PROVIDER - CARE PROVIDER_API CALL
Aundrea Marte  [facility] General Surgical Oncology  [address] [place] [id number]  Phone: [phone]  Fax: [phone]  Follow Up Time: 1 week

## 2024-07-18 NOTE — DISCHARGE NOTE PROVIDER - NSDCCPTREATMENT_GEN_ALL_CORE_FT
PRINCIPAL PROCEDURE  Procedure: Robot-assisted laparoscopic splenectomy  Findings and Treatment:       SECONDARY PROCEDURE  Procedure: Robot-assisted laparoscopic fenestration of cyst of liver  Findings and Treatment:

## 2024-07-18 NOTE — DISCHARGE NOTE NURSING/CASE MANAGEMENT/SOCIAL WORK - NSDCPEFALRISK_GEN_ALL_CORE
For information on Fall & Injury Prevention, visit: [email or web address] OR  [email or web address] OR  https://www.cdc.gov/steadi/patient.html

## 2024-07-18 NOTE — PROGRESS NOTE ADULT - ASSESSMENT
Assessment:  54 yo F s/p robot-assisted laparoscopic splenectomy and hepatic cyst fenestration for splenic mass and congenital cystic liver disease.     Plan:  - Pain control   - LRD  - Miralax  - IVF  - dc drain   - Activity: Ambulate as tolerated   - DVT ppx: SCD's 
54 yo F s/p robot-assisted laparoscopic splenectomy and hepatic cyst fenestration for splenic mass and congenital cystic liver disease. Recovering appropriately and planning for discharge.     Plan:  - Pain control   - LRD  - Miralax  - Activity: Ambulate as tolerated   - DVT ppx: SCD's     Surgery D Team  [id number] 
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 L shoulder pain is likely referred 2/2 insufflation    PLAN:   - Pain control PRN  - Rest of care per primary team     Eddie Harrington MD (PGY1)   E team surgery- [id number]
Assessment:  54 yo F s/p robot-assisted laparoscopic splenectomy and hepatic cyst fenestration for splenic mass and congenital cystic liver disease.     Plan:  - Pain control   - Diet: CLD  - IVF  - monitor drain output  - Activity: Ambulate as tolerated   - DVT ppx: SCD's

## 2024-07-18 NOTE — DISCHARGE NOTE PROVIDER - NSDCCPCAREPLAN_GEN_ALL_CORE_FT
PRINCIPAL DISCHARGE DIAGNOSIS  Diagnosis: Splenic mass  Assessment and Plan of Treatment: WOUND CARE:  Please keep incisions clean and dry. Please do not Scrub or rub incisions. Do not use lotion or powder on incisions.   BATHING: You may shower and/or sponge bathe. You may use warm soapy water in the shower and rinse, pat dry.  ACTIVITY: No heavy lifting or straining. Otherwise, you may return to your usual level of physical activity. If you are taking narcotic pain medication DO NOT drive a car, operate machinery or make important decisions.  PAIN: You may take over-the-counter medications for pain. You make take Tylenol orally every 6 hours as needed. Do not exceed 4,000mg a day. You may take ibuprofen every 6 hours. You may alternate between the two for better pain control (every 3 hours). You have been prescribed narcotics for pain management. Please take as prescribed on pill bottle, AS NEEDED, for BREAKTHROUGH pain ONLY. If you are taking narcotic pain medication DO NOT drive a car, operate machinery or make important decisions.  DIET: Return to your usual diet.  NOTIFY YOUR SURGEON IF YOU HAVE: any bleeding that does not stop, any pus draining from your wound(s), any fever (over 100.4 F) persistent nausea/vomiting, or if your pain is not controlled on your discharge pain medications, unable to urinate.  FOLLOW UP:  1. Please follow up with your primary care physician in one week regarding your hospitalization, bring copies of your discharge paperwork.  2. Please follow up with your surgeon, Dr. Marte. Call [phone] to make an appointment.      SECONDARY DISCHARGE DIAGNOSES  Diagnosis: S/P splenectomy  Assessment and Plan of Treatment: During your admission you received these vaccines:  1. Menveo  2. Bexsero   3. Pneumococcal (Prevnar 20)   4. HIB   The next doses and required boosters are listed below:  In 2 months: You should receive Menveo, Bexsero, Pneumovax 23  Every year: You should receive Seasonal Influenza Vaccine  In 5 years: Follow up with your primary care physician to determine if you should receive any additional vaccines.

## 2024-07-18 NOTE — PROGRESS NOTE ADULT - SUBJECTIVE AND OBJECTIVE BOX
D Team Surgery Daily Progress Note  =====================================================    SUBJECTIVE: Patient seen and examined at bedside on AM rounds. No acute events overnight. Pt states tolerating CLD. Pt c/o incisional pain and referred L shoulder/upper back pain     ALLERGIES:  Augmentin (Flushing; Rash)  Augmentin (Flushing)      --------------------------------------------------------------------------------------    MEDICATIONS:    Neurologic Medications  acetaminophen   IVPB .. 1000 milliGRAM(s) IV Intermittent every 6 hours  HYDROmorphone  Injectable 0.5 milliGRAM(s) IV Push every 4 hours PRN Breakthrough pain  HYDROmorphone  Injectable 0.5 milliGRAM(s) IV Push every 10 minutes PRN Severe Pain (7 - 10)  oxyCODONE    IR 5 milliGRAM(s) Oral every 4 hours PRN Severe Pain (7 - 10)  oxyCODONE    IR 2.5 milliGRAM(s) Oral every 4 hours PRN Moderate Pain (4 - 6)  sertraline 50 milliGRAM(s) Oral daily    Respiratory Medications    Cardiovascular Medications  amLODIPine   Tablet 10 milliGRAM(s) Oral daily    Gastrointestinal Medications    Genitourinary Medications    Hematologic/Oncologic Medications    Antimicrobial/Immunologic Medications    Endocrine/Metabolic Medications    Topical/Other Medications    --------------------------------------------------------------------------------------    VITAL SIGNS:  T(C): 37.2 (07-16-24 @ 03:58), Max: 37.2 (07-16-24 @ 00:30)  HR: 72 (07-16-24 @ 03:58) (67 - 87)  BP: 146/63 (07-16-24 @ 03:58) (104/64 - 146/74)  RR: 16 (07-16-24 @ 03:58) (10 - 18)  SpO2: 100% (07-16-24 @ 03:58) (95% - 100%)  --------------------------------------------------------------------------------------    INS AND OUTS:    07-15-24 @ 07:01  -  07-16-24 @ 07:00  --------------------------------------------------------  IN: 1005 mL / OUT: 3038 mL / NET: -2033 mL      --------------------------------------------------------------------------------------      Physical Examination:  General: NAD, resting comfortably in bed  Respiratory: Nonlabored respirations, normal CW expansion.  Abdomen: softly distended, appropriately tender, surgical incisions are c/d/i, no erythema nor signs of infection. Dressings are c/d/i. drain in place with SS output  --------------------------------------------------------------------------------------    LABS      
TEAM [ E ] Surgery Daily Progress Note  =====================================================    SUBJECTIVE: Patient is tolerating a diet, + flatus but no BMs. Denies fever, chills, N/V, chest pain, SOB. Reports pain in the L arm/neck.     ALLERGIES:  Augmentin (Flushing; Rash)  Augmentin (Flushing)    -------------------------------------------------------------------------------------    MEDICATIONS:  acetaminophen     Tablet .. 1000 milliGRAM(s) Oral every 6 hours  amLODIPine   Tablet 10 milliGRAM(s) Oral daily  diphenhydrAMINE 25 milliGRAM(s) Oral once PRN  haemophilus B conjugate Vaccine (ActHIB) 0.5 milliLiter(s) IntraMuscular once  heparin   Injectable 5000 Unit(s) SubCutaneous every 8 hours  meningococcal Group B (BEXSERO) Vaccine 0.5 milliLiter(s) IntraMuscular once  meningococcal Groups A/C/Y and W-135 Vaccine (MENVEO) 0.5 milliLiter(s) IntraMuscular once  methocarbamol 500 milliGRAM(s) Oral every 8 hours PRN  oxyCODONE    IR 5 milliGRAM(s) Oral every 4 hours PRN  oxyCODONE    IR 2.5 milliGRAM(s) Oral every 4 hours PRN  pneumococcal  20 Vaccine (PREVNAR 20) 0.5 milliLiter(s) IntraMuscular once  polyethylene glycol 3350 17 Gram(s) Oral daily  potassium phosphate / sodium phosphate Tablet (K-PHOS No. 2) 1 Tablet(s) Oral three times a day  sertraline 50 milliGRAM(s) Oral daily    --------------------------------------------------------------------------------------    VITAL SIGNS:  T(C): 36.9 (07-17-24 @ 12:48), Max: 37.6 (07-16-24 @ 20:08)  HR: 77 (07-17-24 @ 12:48) (63 - 77)  BP: 131/65 (07-17-24 @ 12:48) (131/61 - 149/62)  RR: 16 (07-17-24 @ 12:48) (16 - 16)  SpO2: 97% (07-17-24 @ 12:48) (97% - 100%)  --------------------------------------------------------------------------------------    INS AND OUTS:    07-16-24 @ 07:01  -  07-17-24 @ 07:00  --------------------------------------------------------  IN: 400 mL / OUT: 2850 mL / NET: -2450 mL    07-17-24 @ 07:01  -  07-17-24 @ 14:41  --------------------------------------------------------  IN: 0 mL / OUT: 760 mL / NET: -760 mL      --------------------------------------------------------------------------------------      EXAM    General: NAD, resting in bed comfortably.  Cardiac: regular rate, warm and well perfused  Respiratory: Nonlabored respirations, normal cw expansion.  Abdomen: soft, appropriately tender, mild distention. Incisions c/d/i, LURDES 200mL and serosang  Extremities: normal strength, FROM, no deformities    --------------------------------------------------------------------------------------    LABS      
D Team Surgery Daily Progress Note  =====================================================    SUBJECTIVE: Patient seen and examined at bedside on AM rounds. Pt states tolerating diet. Pt c/o incisional pain and referred L shoulder/upper back pain    ALLERGIES:  Augmentin (Flushing; Rash)  Augmentin (Flushing)      --------------------------------------------------------------------------------------    MEDICATIONS:    Neurologic Medications  acetaminophen     Tablet .. 1000 milliGRAM(s) Oral every 6 hours  diphenhydrAMINE 25 milliGRAM(s) Oral once PRN Rash and/or Itching  methocarbamol 500 milliGRAM(s) Oral every 8 hours PRN Muscle Spasm  oxyCODONE    IR 2.5 milliGRAM(s) Oral every 4 hours PRN Moderate Pain (4 - 6)  oxyCODONE    IR 5 milliGRAM(s) Oral every 4 hours PRN Severe Pain (7 - 10)  sertraline 50 milliGRAM(s) Oral daily    Respiratory Medications    Cardiovascular Medications  amLODIPine   Tablet 10 milliGRAM(s) Oral daily    Gastrointestinal Medications  polyethylene glycol 3350 17 Gram(s) Oral daily    Genitourinary Medications    Hematologic/Oncologic Medications  haemophilus B conjugate Vaccine (ActHIB) 0.5 milliLiter(s) IntraMuscular once  heparin   Injectable 5000 Unit(s) SubCutaneous every 8 hours  meningococcal Group B (BEXSERO) Vaccine 0.5 milliLiter(s) IntraMuscular once  meningococcal Groups A/C/Y and W-135 Vaccine (MENVEO) 0.5 milliLiter(s) IntraMuscular once  pneumococcal  20 Vaccine (PREVNAR 20) 0.5 milliLiter(s) IntraMuscular once    Antimicrobial/Immunologic Medications    Endocrine/Metabolic Medications    Topical/Other Medications    --------------------------------------------------------------------------------------    VITAL SIGNS:  T(C): 36.9 (07-17-24 @ 03:25), Max: 37.6 (07-16-24 @ 20:08)  HR: 63 (07-17-24 @ 03:25) (58 - 73)  BP: 131/61 (07-17-24 @ 03:25) (131/61 - 149/62)  RR: 16 (07-17-24 @ 03:25) (16 - 16)  SpO2: 98% (07-17-24 @ 03:25) (97% - 100%)  --------------------------------------------------------------------------------------    INS AND OUTS:    07-16-24 @ 07:01  -  07-17-24 @ 07:00  --------------------------------------------------------  IN: 400 mL / OUT: 2850 mL / NET: -2450 mL      --------------------------------------------------------------------------------------    Physical Examination:  General: NAD, resting comfortably in bed  Respiratory: Nonlabored respirations, normal CW expansion.  Abdomen: softly distended, appropriately tender, surgical incisions are c/d/i, no erythema nor signs of infection. Dressings are c/d/i. drain in place with SS output    --------------------------------------------------------------------------------------    LABS      
Surgery Progress Note     Interval/Subjective:  Patient seen and examined. Tolerating diet. Passing flatus. Pain controlled. No new complaints today.   __________________________________________________________________  Vitals:  T(C): 36.8 (04:45), Max: 36.9 (08:54)  HR: 66 (04:45) (66 - 77)  BP: 137/59 (04:45) (131/65 - 147/63)  RR: 18 (04:45) (16 - 18)  SpO2: 97% (04:45) (97% - 100%)    I & O's:  IN:    Oral Fluid: 520 mL  Total IN: 520 mL    OUT:    Bulb (mL): 60 mL    Voided (mL): 2050 mL  Total OUT: 2110 mL    24 @ 07:01  -  24 @ 07:00  --------------------------------------------------------  OUT:    Voided (mL): unable to calculate mL/kg/hr  Total OUT: unable to calculate mL/kg/hr    Physical Exam:  General: NAD, resting comfortably in bed  HEENT: Normocephalic atraumatic  Respiratory: Nonlabored respirations  Cardio: regular rate, normotensive  Abdomen: softly distended, appropriately tender, surgical incisions are c/d/i, no erythema nor signs of infection. Dressings are c/d/i.    Labs:  CBC: 24 @ 05:03          11.7   20.26 >----< 425          38.5    Chemistry: 24 @ 05:03  140|103|12    ------------< 93  3.8|28|0.65    Ca: 9.2 24 @ 05:03  M.10 24 @ 05:03  Phos: 3.5 24 @ 05:03    LFT's: 24 @ 05:03  AST: 23  ALT: 21  ALP: 100  T.Bili: 0.3  D.Bili: --  CBC: 24 @ 06:26          12.0   22.72 >----< 349          39.6    Chemistry: 24 @ 06:26  138|103|10    ------------< 118  3.6|25|0.67    Ca: 9.0 24 @ 06:26  M.10 24 @ 06:26  Phos: 2.2 24 @ 06:26    LFT's: 24 @ 06:26  AST: 19  ALT: 15  ALP: 91  T.Bili: 0.3  D.Bili: <0.2    __________________________________________________________________

## 2024-07-18 NOTE — DISCHARGE NOTE PROVIDER - CARE PROVIDERS DIRECT ADDRESSES
[email or web address]
no

## 2024-07-18 NOTE — DISCHARGE NOTE PROVIDER - NSDCFUADDINST_GEN_ALL_CORE_FT
Low fiber diet: Fiber is part of fruits, vegetables, and grains not digested by your body. A low-fiber diet restricts these foods. The goal of this diet is to have fewer, smaller bowel movements.   Avoid these foods:    Nuts, seeds, dried fruit and coconut  Whole grains, popcorn, wheat germ and bran  Brown rice, wild rice, oatmeal, granola, shredded wheat, quinoa, bulgur and barley  Dried beans, baked beans, lima beans, peas and lentils  [place] peanut butter  Raw, uncooked fruits and vegetables except vegetable and fruit juices, bananas, melons, applesauce and canned peaches     Choose these foods:    Tender meat, fish and poultry, ham, hoffman, shellfish, and lunch meat  Eggs, tofu and creamy peanut butter  Dairy products if tolerated  White rice and pasta  Baked goods made with refined wheat or rye flour, such as bread, biscuits, pancakes, waffles, bagels, saltines and adam crackers  Hot and cold cereals that have less than 2 grams of dietary fiber in a single serving, such as those made from rice  Canned or well-cooked potatoes, carrots and green beans  Plain tomato sauce  Vegetable and fruit juices  Bananas, melons, applesauce and canned peaches (no skin)  Butter, margarine, oils and salad dressings without seeds

## 2024-07-18 NOTE — DISCHARGE NOTE PROVIDER - HOSPITAL COURSE
This patient is a 53F with PMH polycystic kidney disease, found to have splenic mass who presented to [facility] on 7/15/24 for scheduled surgery. Patient taken to the OR by Dr. Marte and underwent robotic splenectomy and liver cyst fenestration. Patient tolerated operation well and there were no post-operative complications identified. Patient remained hemodynamically stable in the PACU and transferred to the surgical floor. Diet was restarted and advanced as tolerated. Pain control was transitioned from IV to PO pain meds.   Surgical LURDES drain removed POD2. Patient seen by physical therapy throughout hospital stay who recommended patient be discharged home without skilled PT needs. Patient received post-splenectomy vaccines on day of discharge (Menveo, Bexsero, ActHIB, Prevnar 20)  At this time, patient is currently ambulating, voiding, tolerating a regular diet. Pain well controlled on PO pain meds. Patient has been deemed stable for discharge home with follow up as an outpatient.

## 2024-07-18 NOTE — DISCHARGE NOTE NURSING/CASE MANAGEMENT/SOCIAL WORK - NSDCPNINST_GEN_ALL_CORE
Call 911 for chest pain, and/or difficulty breathing. Report to your doctor any fever, pus to incisions.

## 2024-07-18 NOTE — DISCHARGE NOTE NURSING/CASE MANAGEMENT/SOCIAL WORK - PATIENT PORTAL LINK FT
You can access the FollowMyHealth Patient Portal offered by [facility] by registering at the following website: [email or web address]. By joining [employer]’s FollowMyHealth portal, you will also be able to view your health information using other applications (apps) compatible with our system.

## 2024-07-20 NOTE — ED POST DISCHARGE NOTE - RESULT SUMMARY
Pt called ED, stating she just got discharged from surgery and has concerns about the drainage that is occurring.  Per EMR review, pt was just admitted, discharged from hospital where pt was on E Team Surgery service under Dr. Marte.  I paged the surgical team and spoke with Dr. Ann who stated she will call pt back promptly to answer pt's concerns.

## 2024-07-23 ENCOUNTER — APPOINTMENT (OUTPATIENT)
Dept: SURGICAL ONCOLOGY | Facility: CLINIC | Age: 54
End: 2024-07-23

## 2024-07-26 LAB — HEMATOPATHOLOGY REPORT: SIGNIFICANT CHANGE UP

## 2024-07-29 LAB — CHROM ANALY OVERALL INTERP SPEC-IMP: SIGNIFICANT CHANGE UP

## 2024-08-01 ENCOUNTER — APPOINTMENT (OUTPATIENT)
Dept: SURGICAL ONCOLOGY | Facility: CLINIC | Age: 54
End: 2024-08-01
Payer: COMMERCIAL

## 2024-08-01 VITALS
HEART RATE: 84 BPM | DIASTOLIC BLOOD PRESSURE: 78 MMHG | HEIGHT: 61 IN | WEIGHT: 228 LBS | OXYGEN SATURATION: 98 % | SYSTOLIC BLOOD PRESSURE: 120 MMHG | BODY MASS INDEX: 43.05 KG/M2

## 2024-08-01 DIAGNOSIS — D73.89 OTHER DISEASES OF SPLEEN: ICD-10-CM

## 2024-08-01 PROCEDURE — 99214 OFFICE O/P EST MOD 30 MIN: CPT | Mod: 24

## 2024-08-01 NOTE — PHYSICAL EXAM
[FreeTextEntry1] : General - pleasant well developed  Head/Face - Atraumatic Eyes - PERRL, no sclera icterus ENMT - MMM, normal nose/ears, no oropharyngeal lesion Neck - supple, no thyromegaly, no masses    Lymphatics - no palpable adenopathy CV/pulm- breathing comfortably Abd - soft, non-tender, nondistended. surgical incision healing well. Left side port stitches (2) removed.  Ext - no c/c/e, normal ROM in each extremity  Skin - no skin lesion on sun exposed skin, no jaundice Neuro - alert and oriented x 3 Psych - normal mood and affect

## 2024-08-01 NOTE — HISTORY OF PRESENT ILLNESS
[de-identified] : Linsey Hurtado is a 52 yr old female, presenting today for a post operative visit.    She was diagnosed at age 32 with polycystic kidney disease and has been monitoring her kidney and liver for cysts ever since with yearly ultrasounds.   She underwent a CT scan in 2003 for abdominal pain at [facility] which revealed multiple bilateral renal and hepatic cysts.  An US performed on 6/6/2022 revealed hepatomegaly and innumerable hepatic and renal cysts in addition to a splenic lesion measuring 6.2 x 6.7 x 6.3 cm, not seen in prior study (7/28/2020).   She subsequently underwent an MRI on 6/15/2022 which showed borderline in size spleen (13cm), mass involving the posterior aspect of the spleen measuring 7 x 6 cm, which does not show typical enhancement features of hemangioma. A repeat MRI performed on 6/1/23 revealed a slight increase in size of the splenic lesion measuring  8.0 x 7.5 cm, previously measured 7.2 x 7.3 cm in size.   Her case was presented at [facility] and the consensus was to perform a splenectomy vs PET and biopsy, with the understanding that the gold standard to rule out malignancy would be final pathology. On 7/9/23 she underwent a PET/CT which showed no FDG avidity in the splenic lesion.   She saw Dr Bee for a biopsy of the splenic lesion on 10/11/23 but has decided against biopsy due to potential risks. 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   She later underwent splenectomy and liver cyst fenestration on 7/15/24, Path: benign cyst findings, and splenic hemangioma.  Today she presents for her post operative visit. She is doing very well, is ambulating freely, moving her bowels appropriately, eating well. She denies any abdominal pain or constitutional symptoms. She received her post splenectomy vaccines including Menveo, Bexsero, Hib, Prevnar 20 in the hospital.

## 2024-08-01 NOTE — ASSESSMENT
[FreeTextEntry1] : 53 y/o female is recovering very well from her splenectomy and liver cyst fenestration performed on 7/15/24.  We discussed her pathology which is benign, and she no longer needs any surveillance iaging.  She no longer needs to follow up with me but she will follow up with my NP, Roxanne, for post splenectomy vaccine boosters. She knows to call my office with any questions or concerns in the future.  She asked many good questions that I answered to the best of my ability.   Sincerely, Aundrea Marte MD Division of Surgical Oncology Director, [facility] , [employer] Surgical On[employer].

## 2024-08-01 NOTE — HISTORY OF PRESENT ILLNESS
[de-identified] : Linsey Hurtado is a 52 yr old female, presenting today for a post operative visit.    She was diagnosed at age 32 with polycystic kidney disease and has been monitoring her kidney and liver for cysts ever since with yearly ultrasounds.   She underwent a CT scan in 2003 for abdominal pain at [facility] which revealed multiple bilateral renal and hepatic cysts.  An US performed on 6/6/2022 revealed hepatomegaly and innumerable hepatic and renal cysts in addition to a splenic lesion measuring 6.2 x 6.7 x 6.3 cm, not seen in prior study (7/28/2020).   She subsequently underwent an MRI on 6/15/2022 which showed borderline in size spleen (13cm), mass involving the posterior aspect of the spleen measuring 7 x 6 cm, which does not show typical enhancement features of hemangioma. A repeat MRI performed on 6/1/23 revealed a slight increase in size of the splenic lesion measuring  8.0 x 7.5 cm, previously measured 7.2 x 7.3 cm in size.   Her case was presented at [facility] and the consensus was to perform a splenectomy vs PET and biopsy, with the understanding that the gold standard to rule out malignancy would be final pathology. On 7/9/23 she underwent a PET/CT which showed no FDG avidity in the splenic lesion.   She saw Dr Bee for a biopsy of the splenic lesion on 10/11/23 but has decided against biopsy due to potential risks. 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   She later underwent splenectomy and liver cyst fenestration on 7/15/24, Path: benign cyst findings, and splenic hemangioma.  Today she presents for her post operative visit. She is doing very well, is ambulating freely, moving her bowels appropriately, eating well. She denies any abdominal pain or constitutional symptoms. She received her post splenectomy vaccines including Menveo, Bexsero, Hib, Prevnar 20 in the hospital.

## 2024-08-01 NOTE — HISTORY OF PRESENT ILLNESS
Pt placed on 10L NRB for transport to CT scan with CT tech.   [de-identified] : Linsey Hurtado is a 52 yr old female, presenting today for a post operative visit.    She was diagnosed at age 32 with polycystic kidney disease and has been monitoring her kidney and liver for cysts ever since with yearly ultrasounds.   She underwent a CT scan in 2003 for abdominal pain at [facility] which revealed multiple bilateral renal and hepatic cysts.  An US performed on 6/6/2022 revealed hepatomegaly and innumerable hepatic and renal cysts in addition to a splenic lesion measuring 6.2 x 6.7 x 6.3 cm, not seen in prior study (7/28/2020).   She subsequently underwent an MRI on 6/15/2022 which showed borderline in size spleen (13cm), mass involving the posterior aspect of the spleen measuring 7 x 6 cm, which does not show typical enhancement features of hemangioma. A repeat MRI performed on 6/1/23 revealed a slight increase in size of the splenic lesion measuring  8.0 x 7.5 cm, previously measured 7.2 x 7.3 cm in size.   Her case was presented at [facility] and the consensus was to perform a splenectomy vs PET and biopsy, with the understanding that the gold standard to rule out malignancy would be final pathology. On 7/9/23 she underwent a PET/CT which showed no FDG avidity in the splenic lesion.   She saw Dr Bee for a biopsy of the splenic lesion on 10/11/23 but has decided against biopsy due to potential risks. 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   She later underwent splenectomy and liver cyst fenestration on 7/15/24, Path: benign cyst findings, and splenic hemangioma.  Today she presents for her post operative visit. She is doing very well, is ambulating freely, moving her bowels appropriately, eating well. She denies any abdominal pain or constitutional symptoms. She received her post splenectomy vaccines including Menveo, Bexsero, Hib, Prevnar 20 in the hospital.

## 2024-08-08 PROBLEM — Z90.81 POST-SPLENECTOMY: Status: ACTIVE | Noted: 2024-08-08

## 2024-08-30 NOTE — ED ADULT TRIAGE NOTE - NS ED TRIAGE AVPU SCALE
Julio Vigil is a 22 year old male presenting to the walk-in clinic today alone for a STD screening.  Partner tested positive for Chlamydia.    Treatment tried prior to visit: None    Swabs/Specimens collected during rooming process:  Urine    Work, School or  note needed: No    New vs Established: New    Patient would like communication of their results via:      Cell Phone:   Telephone Information:   Mobile [phone]     Okay to leave a message containing results? Yes     Alert-The patient is alert, awake and responds to voice. The patient is oriented to time, place, and person. The triage nurse is able to obtain subjective information.

## 2024-10-16 ENCOUNTER — APPOINTMENT (OUTPATIENT)
Dept: SURGICAL ONCOLOGY | Facility: CLINIC | Age: 54
End: 2024-10-16

## 2024-10-16 VITALS
BODY MASS INDEX: 43.05 KG/M2 | WEIGHT: 228 LBS | DIASTOLIC BLOOD PRESSURE: 78 MMHG | HEART RATE: 76 BPM | HEIGHT: 61 IN | OXYGEN SATURATION: 96 % | SYSTOLIC BLOOD PRESSURE: 130 MMHG

## 2024-10-16 DIAGNOSIS — Z90.81 ACQUIRED ABSENCE OF SPLEEN: ICD-10-CM

## 2024-10-31 ENCOUNTER — NON-APPOINTMENT (OUTPATIENT)
Age: 54
End: 2024-10-31

## 2024-10-31 ENCOUNTER — INPATIENT (INPATIENT)
Facility: HOSPITAL | Age: 54
LOS: 2 days | Discharge: HOME | DRG: 864 | End: 2024-11-03
Attending: HOSPITALIST | Admitting: INTERNAL MEDICINE
Payer: COMMERCIAL

## 2024-10-31 VITALS
HEART RATE: 97 BPM | DIASTOLIC BLOOD PRESSURE: 87 MMHG | OXYGEN SATURATION: 97 % | WEIGHT: 229.94 LBS | HEIGHT: 70 IN | SYSTOLIC BLOOD PRESSURE: 167 MMHG | TEMPERATURE: 101 F | RESPIRATION RATE: 18 BRPM

## 2024-10-31 DIAGNOSIS — Z98.890 OTHER SPECIFIED POSTPROCEDURAL STATES: Chronic | ICD-10-CM

## 2024-10-31 DIAGNOSIS — Z92.89 PERSONAL HISTORY OF OTHER MEDICAL TREATMENT: Chronic | ICD-10-CM

## 2024-10-31 LAB
ANION GAP SERPL CALC-SCNC: 9 MMOL/L — SIGNIFICANT CHANGE UP (ref 5–17)
APPEARANCE UR: CLEAR — SIGNIFICANT CHANGE UP
BILIRUB UR-MCNC: NEGATIVE — SIGNIFICANT CHANGE UP
BUN SERPL-MCNC: 14 MG/DL — SIGNIFICANT CHANGE UP (ref 7–23)
CALCIUM SERPL-MCNC: 9.6 MG/DL — SIGNIFICANT CHANGE UP (ref 8.4–10.5)
CHLORIDE SERPL-SCNC: 104 MMOL/L — SIGNIFICANT CHANGE UP (ref 96–108)
CO2 SERPL-SCNC: 28 MMOL/L — SIGNIFICANT CHANGE UP (ref 22–31)
COLOR SPEC: YELLOW — SIGNIFICANT CHANGE UP
CREAT SERPL-MCNC: 0.91 MG/DL — SIGNIFICANT CHANGE UP (ref 0.5–1.3)
DIFF PNL FLD: NEGATIVE — SIGNIFICANT CHANGE UP
EGFR: 76 ML/MIN/1.73M2 — SIGNIFICANT CHANGE UP
GLUCOSE SERPL-MCNC: 174 MG/DL — HIGH (ref 70–99)
GLUCOSE UR QL: NEGATIVE MG/DL — SIGNIFICANT CHANGE UP
HCT VFR BLD CALC: 41.3 % — SIGNIFICANT CHANGE UP (ref 34.5–45)
HGB BLD-MCNC: 13.3 G/DL — SIGNIFICANT CHANGE UP (ref 11.5–15.5)
KETONES UR-MCNC: NEGATIVE MG/DL — SIGNIFICANT CHANGE UP
LEUKOCYTE ESTERASE UR-ACNC: NEGATIVE — SIGNIFICANT CHANGE UP
MCHC RBC-ENTMCNC: 23.8 PG — LOW (ref 27–34)
MCHC RBC-ENTMCNC: 32.2 G/DL — SIGNIFICANT CHANGE UP (ref 32–36)
MCV RBC AUTO: 73.8 FL — LOW (ref 80–100)
NITRITE UR-MCNC: NEGATIVE — SIGNIFICANT CHANGE UP
PH UR: 6 — SIGNIFICANT CHANGE UP (ref 5–8)
PLATELET # BLD AUTO: 539 K/UL — HIGH (ref 150–400)
POTASSIUM SERPL-MCNC: 4 MMOL/L — SIGNIFICANT CHANGE UP (ref 3.5–5.3)
POTASSIUM SERPL-SCNC: 4 MMOL/L — SIGNIFICANT CHANGE UP (ref 3.5–5.3)
PROT UR-MCNC: NEGATIVE MG/DL — SIGNIFICANT CHANGE UP
RBC # BLD: 5.6 M/UL — HIGH (ref 3.8–5.2)
RBC # FLD: 22 % — HIGH (ref 10.3–14.5)
SODIUM SERPL-SCNC: 141 MMOL/L — SIGNIFICANT CHANGE UP (ref 135–145)
SP GR SPEC: 1.01 — SIGNIFICANT CHANGE UP (ref 1–1.03)
UROBILINOGEN FLD QL: 0.2 MG/DL — SIGNIFICANT CHANGE UP (ref 0.2–1)
WBC # BLD: 16.02 K/UL — HIGH (ref 3.8–10.5)
WBC # FLD AUTO: 16.02 K/UL — HIGH (ref 3.8–10.5)

## 2024-10-31 PROCEDURE — 99285 EMERGENCY DEPT VISIT HI MDM: CPT

## 2024-10-31 PROCEDURE — 93010 ELECTROCARDIOGRAM REPORT: CPT

## 2024-10-31 PROCEDURE — 71045 X-RAY EXAM CHEST 1 VIEW: CPT | Mod: 26

## 2024-10-31 RX ORDER — VANCOMYCIN HYDROCHLORIDE 50 MG/ML
1000 KIT ORAL ONCE
Refills: 0 | Status: COMPLETED | OUTPATIENT
Start: 2024-10-31 | End: 2024-10-31

## 2024-10-31 RX ORDER — SODIUM CHLORIDE 9 MG/ML
2100 INJECTION, SOLUTION INTRAMUSCULAR; INTRAVENOUS; SUBCUTANEOUS ONCE
Refills: 0 | Status: COMPLETED | OUTPATIENT
Start: 2024-10-31 | End: 2024-10-31

## 2024-10-31 RX ORDER — ACETAMINOPHEN 500 MG
1000 TABLET ORAL ONCE
Refills: 0 | Status: COMPLETED | OUTPATIENT
Start: 2024-10-31 | End: 2024-10-31

## 2024-10-31 RX ADMIN — SODIUM CHLORIDE 2100 MILLILITER(S): 9 INJECTION, SOLUTION INTRAMUSCULAR; INTRAVENOUS; SUBCUTANEOUS at 23:26

## 2024-10-31 RX ADMIN — Medication 400 MILLIGRAM(S): at 23:26

## 2024-10-31 RX ADMIN — Medication 1000 MILLIGRAM(S): at 23:56

## 2024-10-31 NOTE — ED PROVIDER NOTE - CLINICAL SUMMARY MEDICAL DECISION MAKING FREE TEXT BOX
53y F with PMHx s/p Splenectomy in July, FORREST, polycystic kidney disease presents c/o low grade fever (Tmax 99) x 5 days. States fever was associated with a non-productive cough, rhinorrhea, sore throat, nausea. She also states she had 2 episodes of diarrhea yesterday. Pt has been taking 1000mg Tylenol BID since Sunday, her last dose was 2:30pm today. Pt went to urgent care earlier today where she was told her BP was elevated (exact reading unknown). Pt also experiences she has had increased anxiety over the past week due to "issues with her ." States the increased anxiety is most likely caused the diarrhea and increased BP. Pt received Pneumococcal and Meningococcal vaccine in July. Denies SOB, CP, vomiting.  White count of 16 CMP normal UA negative chest x-ray clear COVID and flu negative sepsis fluids sepsis antibiotics plan to admit the patient history of splenectomy immunocompromised likely bacteremic

## 2024-10-31 NOTE — ED PROVIDER NOTE - PHYSICAL EXAMINATION
General: Anxious appearing female. A+Ox3. In no acute distress.   Lungs: Clear to ausculation B/L. Normal respirations. Symmetric chest rise.   Chest: RRR  Abdomen: Soft, non-tender, non-distended. Positive bowel sounds. No rebound or guarding. General: Anxious appearing female. A+Ox3. In no acute distress.   Lungs: Clear to ausculation B/L. Normal respirations. Symmetric chest rise.   Chest: RRR  Abdomen: Soft, non-tender, non-distended. Positive bowel sounds. No rebound or guarding.  General:     NAD, well-nourished, well-appearing  Head:     NC/AT, EOMI, oral mucosa dry   Nontoxic non-lethargic nonirritable  Neck:     trachea midline  Lungs:     CTA b/l, no w/r/r  CVS:     S1S2, RRR, no m/g/r  Abd:     +BS, s/nt/nd, no organomegaly  Ext:    2+ radial and pedal pulses, no c/c/e  Neuro: AAOx3, no sensory/motor deficits

## 2024-10-31 NOTE — ED PROVIDER NOTE - OBJECTIVE STATEMENT
53y F with PMHx s/p Splenectomy in July, FORREST, polycystic kidney disease presents c/o low grade fever (Tmax 99) x 5 days. States fever was associated with a non-productive cough, rhinorrhea, sore throat, nausea. She also states she had 2 episodes of diarrhea yesterday. Pt has been taking 1000mg Tylenol BID since Sunday, her last dose was 2:30pm today. Pt went to urgent care earlier today where she was told her BP was elevated (exact reading unknown). Pt also experiences she has had increased anxiety over the past week due to "issues with her ." States the increased anxiety is most likely caused the diarrhea and increased BP. Pt received Pneumococcal and Meningococcal vaccine in July. Denies SOB, CP, vomiting.

## 2024-10-31 NOTE — ED ADULT TRIAGE NOTE - HOW PATIENT ADDRESSED, PROFILE
Panel Management Review      Patient has the following on her problem list:     Depression / Dysthymia review    Measure:  Needs PHQ-9 score of 4 or less during index window.  Administer PHQ-9 and if score is 5 or more, send encounter to provider for next steps.    5 - 7 month window range:     PHQ-9 SCORE 9/6/2018 12/5/2019 6/8/2020   PHQ-9 Total Score 14 11 10       If PHQ-9 recheck is 5 or more, route to provider for next steps.    Patient is due for:  PHQ9      IVD   ASA: Passed    Last LDL:    Lab Results   Component Value Date    CHOL 222 06/11/2018     Lab Results   Component Value Date    HDL 50 06/11/2018     Lab Results   Component Value Date     06/11/2018     Lab Results   Component Value Date    TRIG 141 06/11/2018      No results found for: CHOLHDLRATIO     Is the patient on a Statin? NO   Is the patient on Aspirin? YES                  Medications     Salicylates     aspirin 81 MG EC tablet             Last three blood pressure readings:  BP Readings from Last 3 Encounters:   04/14/21 115/78   04/14/21 (!) 136/91   06/25/20 132/82        Tobacco History:     History   Smoking Status     Never Smoker   Smokeless Tobacco     Never Used         Composite cancer screening  Chart review shows that this patient is due/due soon for the following Pap Smear, Mammogram and Colonoscopy  Summary:    Patient is due/failing the following:   ACP, COVID vaccine, HIV screen, Hep C screen, shingrix, BP CHECK, COLONOSCOPY, MAMMOGRAM, PAP/HPV, PHQ9, UDS and PHYSICAL    Action needed:   Patient needs office visit for physical.    Type of outreach:    Sent [employer] message.    Questions for provider review:    None                                                                                                                                    Aileen Chao CMA       Chart routed to none .           jenny

## 2024-10-31 NOTE — ED ADULT NURSE NOTE - NSFALLCONCLUSION_ED_ALL_ED
Universal Safety Interventions [FreeTextEntry1] : EXAM: CT BRAIN\par \par \par PROCEDURE DATE: 06/14/2021\par \par \par \par INTERPRETATION: Clinical indications: Follow-up subdural hematoma\par \par Multiple axial sections were performed from base of skull to vertex without contrast enhancement. Coronal and sagittal reconstructions were performed as well\par \par This exam is compared with prior noncontrast head CT performed on 8/20/2020 and brain MRI performed on March 15, 2021.\par \par Postop changes compatible left temporal frontal craniotomy is again identified.\par \par There is evidence of a subdural collection again identified in the postop region. There is now associated air identified in this region. This subdural collection and air measures approximately 0.8 cm widest diameter and previously measured approximately 1.6 cm widest diameter (CT scan performed on August 20, 2020). This subdural collection measures approximately 0.9 cm and prior brain MRI. Decrease mass effect on the adjacent parenchyma is seen.\par \par Parenchymal volume loss and chronic microvascular ischemic changes are identified\par \par Patient structures with appropriate window setting postop changes appear normal\par \par The visualized paranasal sinuses mastoid regions appear clear.\par \par IMPRESSION: Previously noted subdural collection is decreased in size when compared with the prior head CT and brain MRI.\par \par \par \par \par

## 2024-11-01 DIAGNOSIS — Z90.81 ACQUIRED ABSENCE OF SPLEEN: Chronic | ICD-10-CM

## 2024-11-01 DIAGNOSIS — R50.9 FEVER, UNSPECIFIED: ICD-10-CM

## 2024-11-01 LAB
ALBUMIN SERPL ELPH-MCNC: 3.3 G/DL — SIGNIFICANT CHANGE UP (ref 3.3–5)
ALBUMIN SERPL ELPH-MCNC: 3.8 G/DL — SIGNIFICANT CHANGE UP (ref 3.3–5)
ALP SERPL-CCNC: 133 U/L — HIGH (ref 40–120)
ALP SERPL-CCNC: 157 U/L — HIGH (ref 40–120)
ALT FLD-CCNC: 20 U/L — SIGNIFICANT CHANGE UP (ref 10–45)
ALT FLD-CCNC: 25 U/L — SIGNIFICANT CHANGE UP (ref 10–45)
ANION GAP SERPL CALC-SCNC: 5 MMOL/L — SIGNIFICANT CHANGE UP (ref 5–17)
ANISOCYTOSIS BLD QL: SIGNIFICANT CHANGE UP
APTT BLD: 36.7 SEC — HIGH (ref 24.5–35.6)
AST SERPL-CCNC: 18 U/L — SIGNIFICANT CHANGE UP (ref 10–40)
AST SERPL-CCNC: 24 U/L — SIGNIFICANT CHANGE UP (ref 10–40)
BASOPHILS # BLD AUTO: 0 K/UL — SIGNIFICANT CHANGE UP (ref 0–0.2)
BASOPHILS # BLD AUTO: 0.09 K/UL — SIGNIFICANT CHANGE UP (ref 0–0.2)
BASOPHILS NFR BLD AUTO: 0 % — SIGNIFICANT CHANGE UP (ref 0–2)
BASOPHILS NFR BLD AUTO: 0.6 % — SIGNIFICANT CHANGE UP (ref 0–2)
BILIRUB SERPL-MCNC: 0.2 MG/DL — SIGNIFICANT CHANGE UP (ref 0.2–1.2)
BILIRUB SERPL-MCNC: 0.3 MG/DL — SIGNIFICANT CHANGE UP (ref 0.2–1.2)
BUN SERPL-MCNC: 12 MG/DL — SIGNIFICANT CHANGE UP (ref 7–23)
BURR CELLS BLD QL SMEAR: PRESENT — SIGNIFICANT CHANGE UP
CALCIUM SERPL-MCNC: 9 MG/DL — SIGNIFICANT CHANGE UP (ref 8.4–10.5)
CHLORIDE SERPL-SCNC: 105 MMOL/L — SIGNIFICANT CHANGE UP (ref 96–108)
CO2 SERPL-SCNC: 29 MMOL/L — SIGNIFICANT CHANGE UP (ref 22–31)
CREAT SERPL-MCNC: 0.82 MG/DL — SIGNIFICANT CHANGE UP (ref 0.5–1.3)
CRP SERPL-MCNC: 7 MG/L — HIGH
DACRYOCYTES BLD QL SMEAR: SLIGHT — SIGNIFICANT CHANGE UP
EGFR: 86 ML/MIN/1.73M2 — SIGNIFICANT CHANGE UP
ELLIPTOCYTES BLD QL SMEAR: SIGNIFICANT CHANGE UP
EOSINOPHIL # BLD AUTO: 0.06 K/UL — SIGNIFICANT CHANGE UP (ref 0–0.5)
EOSINOPHIL # BLD AUTO: 0.32 K/UL — SIGNIFICANT CHANGE UP (ref 0–0.5)
EOSINOPHIL NFR BLD AUTO: 0.4 % — SIGNIFICANT CHANGE UP (ref 0–6)
EOSINOPHIL NFR BLD AUTO: 2 % — SIGNIFICANT CHANGE UP (ref 0–6)
ERYTHROCYTE [SEDIMENTATION RATE] IN BLOOD: 47 MM/HR — HIGH (ref 0–20)
FLUAV AG NPH QL: SIGNIFICANT CHANGE UP
FLUBV AG NPH QL: SIGNIFICANT CHANGE UP
GLUCOSE SERPL-MCNC: 130 MG/DL — HIGH (ref 70–99)
HCT VFR BLD CALC: 38.4 % — SIGNIFICANT CHANGE UP (ref 34.5–45)
HGB BLD-MCNC: 12 G/DL — SIGNIFICANT CHANGE UP (ref 11.5–15.5)
IMM GRANULOCYTES NFR BLD AUTO: 0.3 % — SIGNIFICANT CHANGE UP (ref 0–0.9)
INR BLD: 1.07 RATIO — SIGNIFICANT CHANGE UP (ref 0.85–1.16)
LACTATE SERPL-SCNC: 0.6 MMOL/L — LOW (ref 0.7–2)
LACTATE SERPL-SCNC: 2.6 MMOL/L — HIGH (ref 0.7–2)
LYMPHOCYTES # BLD AUTO: 1.28 K/UL — SIGNIFICANT CHANGE UP (ref 1–3.3)
LYMPHOCYTES # BLD AUTO: 16.8 % — SIGNIFICANT CHANGE UP (ref 13–44)
LYMPHOCYTES # BLD AUTO: 2.49 K/UL — SIGNIFICANT CHANGE UP (ref 1–3.3)
LYMPHOCYTES # BLD AUTO: 8 % — LOW (ref 13–44)
MANUAL SMEAR VERIFICATION: SIGNIFICANT CHANGE UP
MCHC RBC-ENTMCNC: 23 PG — LOW (ref 27–34)
MCHC RBC-ENTMCNC: 31.3 G/DL — LOW (ref 32–36)
MCV RBC AUTO: 73.7 FL — LOW (ref 80–100)
MICROCYTES BLD QL: SIGNIFICANT CHANGE UP
MONOCYTES # BLD AUTO: 0.48 K/UL — SIGNIFICANT CHANGE UP (ref 0–0.9)
MONOCYTES # BLD AUTO: 0.78 K/UL — SIGNIFICANT CHANGE UP (ref 0–0.9)
MONOCYTES NFR BLD AUTO: 3 % — SIGNIFICANT CHANGE UP (ref 2–14)
MONOCYTES NFR BLD AUTO: 5.3 % — SIGNIFICANT CHANGE UP (ref 2–14)
NEUTROPHILS # BLD AUTO: 11.31 K/UL — HIGH (ref 1.8–7.4)
NEUTROPHILS # BLD AUTO: 13.94 K/UL — HIGH (ref 1.8–7.4)
NEUTROPHILS NFR BLD AUTO: 76.6 % — SIGNIFICANT CHANGE UP (ref 43–77)
NEUTROPHILS NFR BLD AUTO: 87 % — HIGH (ref 43–77)
NRBC # BLD: 0 /100 WBCS — SIGNIFICANT CHANGE UP (ref 0–0)
NRBC # BLD: 0 /100 WBCS — SIGNIFICANT CHANGE UP (ref 0–0)
PLAT MORPH BLD: NORMAL — SIGNIFICANT CHANGE UP
PLATELET # BLD AUTO: 492 K/UL — HIGH (ref 150–400)
POIKILOCYTOSIS BLD QL AUTO: SIGNIFICANT CHANGE UP
POLYCHROMASIA BLD QL SMEAR: SLIGHT — SIGNIFICANT CHANGE UP
POTASSIUM SERPL-MCNC: 3.8 MMOL/L — SIGNIFICANT CHANGE UP (ref 3.5–5.3)
POTASSIUM SERPL-SCNC: 3.8 MMOL/L — SIGNIFICANT CHANGE UP (ref 3.5–5.3)
PROCALCITONIN SERPL-MCNC: 0.02 NG/ML — SIGNIFICANT CHANGE UP
PROT SERPL-MCNC: 7.4 G/DL — SIGNIFICANT CHANGE UP (ref 6–8.3)
PROT SERPL-MCNC: 8.3 G/DL — SIGNIFICANT CHANGE UP (ref 6–8.3)
PROTHROM AB SERPL-ACNC: 12.6 SEC — SIGNIFICANT CHANGE UP (ref 9.9–13.4)
RBC # BLD: 5.21 M/UL — HIGH (ref 3.8–5.2)
RBC # FLD: 22 % — HIGH (ref 10.3–14.5)
RBC BLD AUTO: ABNORMAL
RSV RNA NPH QL NAA+NON-PROBE: SIGNIFICANT CHANGE UP
SARS-COV-2 RNA SPEC QL NAA+PROBE: SIGNIFICANT CHANGE UP
SCHISTOCYTES BLD QL AUTO: SLIGHT — SIGNIFICANT CHANGE UP
SODIUM SERPL-SCNC: 139 MMOL/L — SIGNIFICANT CHANGE UP (ref 135–145)
TARGETS BLD QL SMEAR: SLIGHT — SIGNIFICANT CHANGE UP
WBC # BLD: 14.78 K/UL — HIGH (ref 3.8–10.5)
WBC # FLD AUTO: 14.78 K/UL — HIGH (ref 3.8–10.5)

## 2024-11-01 PROCEDURE — 99223 1ST HOSP IP/OBS HIGH 75: CPT | Mod: GC

## 2024-11-01 RX ORDER — HEPARIN SODIUM 10000 [USP'U]/ML
5000 INJECTION INTRAVENOUS; SUBCUTANEOUS EVERY 12 HOURS
Refills: 0 | Status: DISCONTINUED | OUTPATIENT
Start: 2024-11-01 | End: 2024-11-01

## 2024-11-01 RX ORDER — KETOROLAC TROMETHAMINE 30 MG/ML
15 INJECTION INTRAMUSCULAR; INTRAVENOUS EVERY 6 HOURS
Refills: 0 | Status: DISCONTINUED | OUTPATIENT
Start: 2024-11-01 | End: 2024-11-03

## 2024-11-01 RX ORDER — INFLUENZ VIR VAC TV P-SURF2003 15MCG/.5ML
0.5 SYRINGE (ML) INTRAMUSCULAR ONCE
Refills: 0 | Status: DISCONTINUED | OUTPATIENT
Start: 2024-11-01 | End: 2024-11-03

## 2024-11-01 RX ORDER — MELATONIN 5 MG
3 TABLET ORAL AT BEDTIME
Refills: 0 | Status: DISCONTINUED | OUTPATIENT
Start: 2024-11-01 | End: 2024-11-03

## 2024-11-01 RX ORDER — MAGNESIUM, ALUMINUM HYDROXIDE 200-200 MG
30 TABLET,CHEWABLE ORAL EVERY 4 HOURS
Refills: 0 | Status: DISCONTINUED | OUTPATIENT
Start: 2024-11-01 | End: 2024-11-03

## 2024-11-01 RX ORDER — ACETAMINOPHEN 500 MG
650 TABLET ORAL EVERY 6 HOURS
Refills: 0 | Status: DISCONTINUED | OUTPATIENT
Start: 2024-11-01 | End: 2024-11-03

## 2024-11-01 RX ORDER — HEPARIN SODIUM 10000 [USP'U]/ML
5000 INJECTION INTRAVENOUS; SUBCUTANEOUS EVERY 8 HOURS
Refills: 0 | Status: DISCONTINUED | OUTPATIENT
Start: 2024-11-01 | End: 2024-11-03

## 2024-11-01 RX ORDER — HYDROXYZINE HCL 25 MG
25 TABLET ORAL ONCE
Refills: 0 | Status: COMPLETED | OUTPATIENT
Start: 2024-11-01 | End: 2024-11-03

## 2024-11-01 RX ORDER — SODIUM CHLORIDE 9 MG/ML
1000 INJECTION, SOLUTION INTRAMUSCULAR; INTRAVENOUS; SUBCUTANEOUS
Refills: 0 | Status: DISCONTINUED | OUTPATIENT
Start: 2024-11-01 | End: 2024-11-02

## 2024-11-01 RX ORDER — AMLODIPINE BESYLATE 10 MG
10 TABLET ORAL DAILY
Refills: 0 | Status: DISCONTINUED | OUTPATIENT
Start: 2024-11-01 | End: 2024-11-03

## 2024-11-01 RX ORDER — FAMOTIDINE 10 MG/ML
20 INJECTION INTRAVENOUS DAILY
Refills: 0 | Status: DISCONTINUED | OUTPATIENT
Start: 2024-11-01 | End: 2024-11-03

## 2024-11-01 RX ORDER — ONDANSETRON HYDROCHLORIDE 2 MG/ML
4 INJECTION, SOLUTION INTRAMUSCULAR; INTRAVENOUS EVERY 8 HOURS
Refills: 0 | Status: DISCONTINUED | OUTPATIENT
Start: 2024-11-01 | End: 2024-11-03

## 2024-11-01 RX ORDER — SERTRALINE HYDROCHLORIDE 50 MG/1
50 TABLET, FILM COATED ORAL DAILY
Refills: 0 | Status: DISCONTINUED | OUTPATIENT
Start: 2024-11-01 | End: 2024-11-03

## 2024-11-01 RX ORDER — LEVOFLOXACIN 750 MG/1
750 TABLET, FILM COATED ORAL EVERY 24 HOURS
Refills: 0 | Status: DISCONTINUED | OUTPATIENT
Start: 2024-11-01 | End: 2024-11-01

## 2024-11-01 RX ORDER — HYDROXYZINE HCL 25 MG
12.5 TABLET ORAL ONCE
Refills: 0 | Status: COMPLETED | OUTPATIENT
Start: 2024-11-01 | End: 2024-11-01

## 2024-11-01 RX ADMIN — Medication 1 LOZENGE: at 04:30

## 2024-11-01 RX ADMIN — HEPARIN SODIUM 5000 UNIT(S): 10000 INJECTION INTRAVENOUS; SUBCUTANEOUS at 13:48

## 2024-11-01 RX ADMIN — KETOROLAC TROMETHAMINE 15 MILLIGRAM(S): 30 INJECTION INTRAMUSCULAR; INTRAVENOUS at 06:34

## 2024-11-01 RX ADMIN — HEPARIN SODIUM 5000 UNIT(S): 10000 INJECTION INTRAVENOUS; SUBCUTANEOUS at 06:34

## 2024-11-01 RX ADMIN — Medication 10 MILLIGRAM(S): at 11:56

## 2024-11-01 RX ADMIN — SODIUM CHLORIDE 2100 MILLILITER(S): 9 INJECTION, SOLUTION INTRAMUSCULAR; INTRAVENOUS; SUBCUTANEOUS at 00:26

## 2024-11-01 RX ADMIN — SODIUM CHLORIDE 100 MILLILITER(S): 9 INJECTION, SOLUTION INTRAMUSCULAR; INTRAVENOUS; SUBCUTANEOUS at 04:30

## 2024-11-01 RX ADMIN — KETOROLAC TROMETHAMINE 15 MILLIGRAM(S): 30 INJECTION INTRAMUSCULAR; INTRAVENOUS at 07:30

## 2024-11-01 RX ADMIN — Medication 650 MILLIGRAM(S): at 18:11

## 2024-11-01 RX ADMIN — SERTRALINE HYDROCHLORIDE 50 MILLIGRAM(S): 50 TABLET, FILM COATED ORAL at 11:56

## 2024-11-01 RX ADMIN — VANCOMYCIN HYDROCHLORIDE 1000 MILLIGRAM(S): KIT at 01:54

## 2024-11-01 RX ADMIN — VANCOMYCIN HYDROCHLORIDE 250 MILLIGRAM(S): KIT at 00:54

## 2024-11-01 RX ADMIN — KETOROLAC TROMETHAMINE 15 MILLIGRAM(S): 30 INJECTION INTRAMUSCULAR; INTRAVENOUS at 12:01

## 2024-11-01 RX ADMIN — FAMOTIDINE 20 MILLIGRAM(S): 10 INJECTION INTRAVENOUS at 11:56

## 2024-11-01 RX ADMIN — HEPARIN SODIUM 5000 UNIT(S): 10000 INJECTION INTRAVENOUS; SUBCUTANEOUS at 21:33

## 2024-11-01 RX ADMIN — Medication 30 MILLILITER(S): at 08:48

## 2024-11-01 RX ADMIN — Medication 12.5 MILLIGRAM(S): at 21:34

## 2024-11-01 NOTE — H&P ADULT - TIME BILLING
Time spent for extensive review of the physical chart, electronic medical record, and documentation to obtain collateral information including but not limited to:  [x] Inpatient records (ED, H&P, primary team, and consultants if applicable, care coordination)  [x] Inpatient values/results (biomarkers, immunoassays, imaging, and microbiology results)  [x] Current or proposed treatment plans  [x] Pharmacotherapy review  [x] Discussion and coordinating care with primary team and interdisciplinary staff and floor staff  [x] Discussion including counseling/ education with the patient, surrogate decision maker, or family.

## 2024-11-01 NOTE — H&P ADULT - ASSESSMENT
54 y/o F with a PMHx of HTN, FORREST, Depression, Polycystic kidney disease, PSHx of splenectomy in July presents today due to 5 days of pharyngitis, low grade temperature, rhinorrhea, non-productive cough and just today pt had 2 episodes of loose stools. Admitted for:    #SIRS   #Fever of unknown origin   #splenectomy in 07/24, not on any prophylactic abx   - CXR personally reviewed, negative; U/A negative  - r/o infectious processes   - In ED T 101, WBC 16.02 , lactate 2.6 -> 0.6  - admit to medicine  - cw IVF  cc/hr  - cw Levofloxacin 750mg IVPB qd  -  cw pain management, acetaminophen 650 q6 PRN, toradol 15mg IVP  - f/u Bcx, Ucx,   - f/u procal, esr, crp   - f/u ID consult     #HTN  - cw Amlodipine 10mg qd    #FORREST  - cw Hydroxyzine 25mg PRN    #Depression  - cw Sertaline 50mg qd    #Dvt PPx  - Hep subq q12    #Code status  - Full code     PCP Dr. Robert Leyva, part of [facility] in [place]    54 y/o F with a PMHx of HTN, FORREST, Depression, Polycystic kidney disease, PSHx of splenectomy in July presents today due to 5 days of pharyngitis, low grade temperature, rhinorrhea, non-productive cough and just today pt had 2 episodes of loose stools. Admitted for:    #SIRS   #Fever of unknown origin   #splenectomy in 07/24, not on any prophylactic abx   - CXR personally reviewed, negative; U/A negative  - r/o infectious processes   - In ED T 101, WBC 16.02 , lactate 2.6 -> 0.6  - admit to medicine  - cw IVF  cc/hr  - cw Levofloxacin 750mg IVPB qd  -  cw pain management, acetaminophen 650 q6 PRN, toradol 15mg IVP  - f/u Bcx, Ucx,   - f/u procal, esr, crp   - f/u ID consult     #HTN  - cw Amlodipine 10mg qd    #FORREST  - cw Hydroxyzine 25mg PRN    #Depression  - cw Sertaline 50mg qd    #Dvt PPx  - Hep subq q12    #Code status  - Full code     PCP Dr. Robert Leyva, part of [facility] in [place]     Case discussed with Dr. Flowers 52 y/o F with a PMHx of HTN, FORREST, Depression, Polycystic kidney disease, PSHx of splenectomy in July presents today due to 5 days of pharyngitis, low grade temperature, rhinorrhea, non-productive cough and just today pt had 2 episodes of loose stools. Admitted for:    #SIRS   #Fever of unknown origin   #splenectomy in 07/24, not on any prophylactic abx   - CXR personally reviewed, negative; U/A negative  - r/o infectious processes   - In ED T 101, WBC 16.02 , lactate 2.6 -> 0.6  - admit to medicine  - cw IVF  cc/hr  - cw Levofloxacin 750mg IVPB qd  -  cw pain management, acetaminophen 650 q6 PRN, toradol 15mg IVP  - f/u Bcx, Ucx,   - f/u procal, esr, crp   - f/u ID consult     #HTN  - cw Amlodipine 10mg qd    #FORREST  - cw Hydroxyzine 25mg PRN    #Depression  - cw Sertaline 50mg qd    #Dvt PPx  - Hep subq q8 (d/t BMI)    #Code status  - Full code     PCP Dr. Robert Leyva, part of [facility] in [place]     Case discussed with Dr. Flowers 52 y/o F with a PMHx of HTN, FORREST, Depression, Polycystic kidney disease, PSHx of splenectomy in July presents today due to 5 days of pharyngitis, low grade temperature, rhinorrhea, non-productive cough and just today pt had 2 episodes of loose stools. Admitted for:    #SIRS   #Fever of unknown origin   #splenectomy in 07/24, not on any prophylactic abx   - CXR personally reviewed, negative; U/A negative  - r/o infectious processes   - In ED T 101, WBC 16.02 , lactate 2.6 -> 0.6  - admit to medicine  - cw IVF  cc/hr  - cw Levofloxacin 750mg IVPB daily  -  cw pain management, acetaminophen 650 q6 PRN, toradol 15mg IVP  - f/u Bcx, Ucx,   - f/u procal, esr, crp   - f/u ID consult     #HTN  - cw Amlodipine 10mg daily    #FORREST  - cw Hydroxyzine 25mg PRN    #Depression  - cw Sertaline 50mg daily    #Dvt PPx  - Hep subq q8 (d/t BMI)    #Code status  - Full code     PCP Dr. Robert Leyva, part of [facility] in [place]     Case discussed with Dr. Flowers

## 2024-11-01 NOTE — H&P ADULT - ATTENDING COMMENTS
54 yo woman with history of depression and anxiety, HTN, menorrhagia, PCKD-AR comes to ED not feeling well. SHe reports a sore throat since Sunday and subjective fever since Monday this week.   She recently had a splenectomy and liver cyst fenestration on 7/15/24 where she received her post splenectomy vaccines including Menveo, Bexsero, She had an outpt appointment on 10/21 where she Received Menveo and Bexero. next Booster in 5 yrs with PCP  Hib, Prevnar 20. Today she has congestion, running sebastian, and being hoarse on and off.  Her exam is +hepatomegaly. Her VS show fever 101, HR 97, stable BP. Labs are significant for WBC, lactate > 2 but neg infectious work up for now    Continue with levaquin for now. Get ID. F/U BCx.   IVF hydration, lozenge x 1

## 2024-11-01 NOTE — PATIENT PROFILE ADULT - FALL HARM RISK - FALLEN IN PAST
Called and spoke with mother. Stated that I am not able to view the images from [facility]. I am only able to view the report. The report read a normal scrotal ultrasound. The report mentioned that there was minimal left fluid in the right hemiscrotum but no hydrocele. This is unclear. I asked mom to obtain the disc from [facility] and either drop it off our mail it to our offices for review. I would need to see the pictures with the images to comment on what was said in the report. I stated that I do not think this is concerning as they read no hydrocele and I did not appreciate a hydrocele on the examination. Mom states that the scrotum does not look swollen. She should monitor for scrotal swelling and follow up if there are concerns. Mom states she has not complained about penile pain or scrotal pain since the last visit. The family is told to call our offices if any problems or concerns should arise in the interim.    No

## 2024-11-01 NOTE — H&P ADULT - NSHPREVIEWOFSYSTEMS_GEN_ALL_CORE
Constitutional: No fevers, chills, or sweats.  Cardiac: No chest pain, exertional dyspnea, orthopnea  Respiratory: No shortness of breath, no cough  GI: No abdominal pain, no N/V/D  Neuro: No headaches, no neck pain/stiffness, no numbness  All other systems reviewed and are negative unless otherwise stated in the HPI. Constitutional: subjective fevers, chills  Cardiac: No chest pain, exertional dyspnea, orthopnea  Respiratory: No shortness of breath, no cough  GI: 2 episodes of loose stools,  no abdominal pain, no N/V  Neuro:  No headaches, no neck pain/stiffness, no numbness  All other systems reviewed and are negative unless otherwise stated in the HPI.

## 2024-11-01 NOTE — H&P ADULT - NSICDXPASTMEDICALHX_GEN_ALL_CORE_FT
PAST MEDICAL HISTORY:  Anxiety     Depression     Hepatic cyst     Hypertension     Polycystic kidney disease     Uterine polyp

## 2024-11-01 NOTE — H&P ADULT - NSHPPHYSICALEXAM_GEN_ALL_CORE
Constitutional: Pt lying in bed, awake and alert, NAD  HEENT: EOMI, normal hearing, moist mucous membranes  Neck: Soft and supple, no JVD  Respiratory: CTABL, No wheezing, rales or rhonchi  Cardiovascular: S1S2+,  mitral regurgitation  Gastrointestinal: BS+, soft, non-tender, +hepatomegaly, no guarding, no rebound  Extremities: No peripheral edema  Vascular: 2+ peripheral pulses  Neurological: AAOx3, no focal deficits  Skin: mild rosacea

## 2024-11-01 NOTE — H&P ADULT - HISTORY OF PRESENT ILLNESS
53y F with PMHx s/p Splenectomy in July, FORREST, polycystic kidney disease presents c/o low grade fever (Tmax 99) x 5 days. States fever was associated with a non-productive cough, rhinorrhea, sore throat, nausea. She also states she had 2 episodes of diarrhea yesterday. Pt has been taking 1000mg Tylenol BID since Sunday, her last dose was 2:30pm today. Pt went to urgent care earlier today where she was told her BP was elevated (exact reading unknown). Pt also experiences she has had increased anxiety over the past week due to "issues with her ." States the increased anxiety is most likely caused the diarrhea and increased BP. Pt received Pneumococcal and Meningococcal vaccine in July. Denies SOB, CP, vomiting.      ED Course  Vitals T 101, HR 97 /87 RR 18 Spo2 97  Labs WBC 16.02 , alkphos 157, lactate 2.6  Imaging CXR  Tx: 500mg  Levofloxacin IVPB, 1000mg vancomycin IVPB, 2100mL sodium chloride bolus, 1000mg acetaminophen IVPB 52 y/o F with a PMHx of HTN, FORREST, Polycystic kidney disease, PSHx of splenectomy in July presents today due to 5 days of pharyngitis, low grade temperature, rhinorrhea, non-productive cough and just today pt had 2 episodes of loose diarrhea. Pt was seen earlier today at the urgent care for the same symptoms, flu/covid/strep workup was negative, pt was  found to have a BP of 148/79. Pt currently denies any nausea, vomiting, diarrhea, shortness of breath, chest pain, abdominal pain.       ED Course  Vitals T 101, HR 97 /87 RR 18 Spo2 97  Labs WBC 16.02 , alkphos 157, lactate 2.6  Imaging CXR  Tx: 500mg  Levofloxacin IVPB, 1000mg vancomycin IVPB, 2100mL sodium chloride bolus, 1000mg acetaminophen IVPB 54 y/o F with a PMHx of HTN, FORREST, Polycystic kidney disease, PSHx of splenectomy in July presents today due to 5 days of pharyngitis, low grade temperature, rhinorrhea, non-productive cough and just today pt had 2 episodes of loose stools. Pt was seen earlier today at the urgent care for the same symptoms, flu/covid/strep workup was negative, pt was  found to have a BP of 148/79. Pt currently denies any nausea, vomiting, diarrhea, shortness of breath, chest pain, abdominal pain.       ED Course  Vitals T 101, HR 97 /87 RR 18 Spo2 97  Labs WBC 16.02 , alkphos 157, lactate 2.6  Imaging CXR  Tx: 500mg  Levofloxacin IVPB, 1000mg vancomycin IVPB, 2100mL sodium chloride bolus, 1000mg acetaminophen IVPB 52 y/o F with a PMHx of HTN, FORREST, Polycystic kidney disease, PSHx of splenectomy in July presents today due to 5 days of pharyngitis, low grade temperature, rhinorrhea, non-productive cough and just today pt had 2 episodes of loose stools. Pt was seen earlier today at the urgent care for the same symptoms, flu/covid/strep workup was negative, pt was  found to have a BP of 148/79. Pt currently denies any nausea, vomiting, diarrhea, shortness of breath, chest pain, abdominal pain.       ED Course  Vitals T 101, HR 97 /87 RR 18 Spo2 97  Labs WBC 16.02 , alkphos 157, lactate 2.6  Imaging CXR personally reviewed, negative for any acute processes   Tx: 500mg  Levofloxacin IVPB, 1000mg vancomycin IVPB, 2100mL sodium chloride bolus, 1000mg acetaminophen IVPB

## 2024-11-01 NOTE — H&P ADULT - NSICDXFAMILYHX_GEN_ALL_CORE_FT
FAMILY HISTORY:  Father  Still living? Unknown  Family history of polycystic kidney disease, Age at diagnosis: Age Unknown    Mother  Still living? Unknown  FH: type 2 diabetes, Age at diagnosis: Age Unknown  FHx: hypertension, Age at diagnosis: Age Unknown    
[[id number]]

## 2024-11-01 NOTE — H&P ADULT - NSICDXPASTSURGICALHX_GEN_ALL_CORE_FT
PAST SURGICAL HISTORY:  H/O endoscopy     History of D&C     History of endometrial biopsy     History of hysteroscopy     S/P breast biopsy     S/P splenectomy

## 2024-11-01 NOTE — CHART NOTE - NSCHARTNOTEFT_GEN_A_CORE
Pt seen and examined at bedside.  No acute events overnight  Pt denies cp, palpitations, sob, abd pain, N/V, fever, chills  Endorses mild sore throat.     Pt's Pt seen and examined at bedside.  No acute events overnight  Pt denies cp, palpitations, sob, abd pain, N/V, fever, chills  Endorses mild sore throat.     Pt's symptoms have improved. 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 Asking whether she should be on abx ppx x one year. Considering her age and without risk of immunocompromised state, pt does not need to be on abx x 1 year. Certainly in light of her recent infection, its warranted for pt to be on empiric abx as opposed to monitoring off  Follow up with ID

## 2024-11-02 LAB
ANION GAP SERPL CALC-SCNC: 5 MMOL/L — SIGNIFICANT CHANGE UP (ref 5–17)
BUN SERPL-MCNC: 14 MG/DL — SIGNIFICANT CHANGE UP (ref 7–23)
CALCIUM SERPL-MCNC: 8.6 MG/DL — SIGNIFICANT CHANGE UP (ref 8.4–10.5)
CHLORIDE SERPL-SCNC: 107 MMOL/L — SIGNIFICANT CHANGE UP (ref 96–108)
CO2 SERPL-SCNC: 30 MMOL/L — SIGNIFICANT CHANGE UP (ref 22–31)
CREAT SERPL-MCNC: 0.68 MG/DL — SIGNIFICANT CHANGE UP (ref 0.5–1.3)
EGFR: 105 ML/MIN/1.73M2 — SIGNIFICANT CHANGE UP
GLUCOSE SERPL-MCNC: 103 MG/DL — HIGH (ref 70–99)
HCT VFR BLD CALC: 36.9 % — SIGNIFICANT CHANGE UP (ref 34.5–45)
HGB BLD-MCNC: 11.6 G/DL — SIGNIFICANT CHANGE UP (ref 11.5–15.5)
MCHC RBC-ENTMCNC: 23.2 PG — LOW (ref 27–34)
MCHC RBC-ENTMCNC: 31.4 G/DL — LOW (ref 32–36)
MCV RBC AUTO: 73.8 FL — LOW (ref 80–100)
NRBC # BLD: 0 /100 WBCS — SIGNIFICANT CHANGE UP (ref 0–0)
PLATELET # BLD AUTO: 496 K/UL — HIGH (ref 150–400)
POTASSIUM SERPL-MCNC: 3.7 MMOL/L — SIGNIFICANT CHANGE UP (ref 3.5–5.3)
POTASSIUM SERPL-SCNC: 3.7 MMOL/L — SIGNIFICANT CHANGE UP (ref 3.5–5.3)
RBC # BLD: 5 M/UL — SIGNIFICANT CHANGE UP (ref 3.8–5.2)
RBC # FLD: 22 % — HIGH (ref 10.3–14.5)
S PYO DNA THROAT QL NAA+PROBE: SIGNIFICANT CHANGE UP
SODIUM SERPL-SCNC: 142 MMOL/L — SIGNIFICANT CHANGE UP (ref 135–145)
WBC # BLD: 11.76 K/UL — HIGH (ref 3.8–10.5)
WBC # FLD AUTO: 11.76 K/UL — HIGH (ref 3.8–10.5)

## 2024-11-02 PROCEDURE — 99232 SBSQ HOSP IP/OBS MODERATE 35: CPT

## 2024-11-02 RX ORDER — AZITHROMYCIN DIHYDRATE 200 MG/5ML
POWDER, FOR SUSPENSION ORAL
Refills: 0 | Status: DISCONTINUED | OUTPATIENT
Start: 2024-11-02 | End: 2024-11-02

## 2024-11-02 RX ADMIN — HEPARIN SODIUM 5000 UNIT(S): 10000 INJECTION INTRAVENOUS; SUBCUTANEOUS at 05:41

## 2024-11-02 RX ADMIN — HEPARIN SODIUM 5000 UNIT(S): 10000 INJECTION INTRAVENOUS; SUBCUTANEOUS at 22:51

## 2024-11-02 RX ADMIN — SERTRALINE HYDROCHLORIDE 50 MILLIGRAM(S): 50 TABLET, FILM COATED ORAL at 12:14

## 2024-11-02 RX ADMIN — Medication 10 MILLIGRAM(S): at 05:41

## 2024-11-02 RX ADMIN — FAMOTIDINE 20 MILLIGRAM(S): 10 INJECTION INTRAVENOUS at 12:13

## 2024-11-02 RX ADMIN — HEPARIN SODIUM 5000 UNIT(S): 10000 INJECTION INTRAVENOUS; SUBCUTANEOUS at 14:48

## 2024-11-02 NOTE — PROGRESS NOTE ADULT - ASSESSMENT
52 y/o F with a PMHx of HTN, FORREST, Depression, Polycystic kidney disease, PSHx of splenectomy in July presents today due to 5 days of pharyngitis, low grade temperature, rhinorrhea, non-productive cough and just today pt had 2 episodes of loose stools. Admitted for:    #SIRS   #Fever of unknown origin   #splenectomy in 07/24, not on any prophylactic abx   -Infectious work up thus far negative  -Sepsis signs/symptoms improved w/ empiric abx; Vitals stable w/o fever, leukocytosis downtrending  - cw Levofloxacin 750mg IVPB daily  - Bcx NGTD  - Strep throat results pending  - If remains stable w/ improvement, will discharge home tomorrow w/ Levaquin to complete 10 day course considering pt w/ recent hx of splenectomy     #HTN  - cw Amlodipine 10mg daily    #FORREST  - cw Hydroxyzine 25mg PRN    #Depression  - cw Sertaline 50mg daily    #Dvt PPx  - Hep subq q8 (d/t BMI)    #Code status  - Full code

## 2024-11-02 NOTE — PROGRESS NOTE ADULT - SUBJECTIVE AND OBJECTIVE BOX
Patient is a 53y old  Female who presents with a chief complaint of fever (01 Nov 2024 02:34)      SUBJECTIVE / OVERNIGHT EVENTS:  Pt seen and examined at bedside. No acute events overnight. Feels much better than before. Sore throat is getting better as well.   Pt denies cp, palpitations, sob, abd pain, N/V, fever, chills.    ROS:  All other review of systems negative    Allergies    Augmentin (Flushing)  Augmentin (Flushing; Rash)    Intolerances        MEDICATIONS  (STANDING):  amLODIPine   Tablet 10 milliGRAM(s) Oral daily  famotidine    Tablet 20 milliGRAM(s) Oral daily  heparin   Injectable 5000 Unit(s) SubCutaneous every 8 hours  influenza   Vaccine 0.5 milliLiter(s) IntraMuscular once  ketorolac   Injectable 15 milliGRAM(s) IV Push every 6 hours  sertraline 50 milliGRAM(s) Oral daily    MEDICATIONS  (PRN):  acetaminophen     Tablet .. 650 milliGRAM(s) Oral every 6 hours PRN Temp greater or equal to 38C (100.4F), Mild Pain (1 - 3)  aluminum hydroxide/magnesium hydroxide/simethicone Suspension 30 milliLiter(s) Oral every 4 hours PRN Dyspepsia  hydrOXYzine hydrochloride 25 milliGRAM(s) Oral once PRN Anxiety  melatonin 3 milliGRAM(s) Oral at bedtime PRN Insomnia  ondansetron Injectable 4 milliGRAM(s) IV Push every 8 hours PRN Nausea and/or Vomiting      Vital Signs Last 24 Hrs  T(C): 36.4 (02 Nov 2024 05:15), Max: 36.8 (01 Nov 2024 20:34)  T(F): 97.5 (02 Nov 2024 05:15), Max: 98.2 (01 Nov 2024 20:34)  HR: 64 (02 Nov 2024 05:15) (58 - 66)  BP: 142/75 (02 Nov 2024 05:15) (131/67 - 165/87)  BP(mean): --  RR: 12 (02 Nov 2024 05:15) (12 - 18)  SpO2: 97% (02 Nov 2024 05:15) (97% - 99%)    Parameters below as of 02 Nov 2024 05:15  Patient On (Oxygen Delivery Method): room air      CAPILLARY BLOOD GLUCOSE        I&O's Summary    01 Nov 2024 07:01  -  02 Nov 2024 07:00  --------------------------------------------------------  IN: 120 mL / OUT: 0 mL / NET: 120 mL        PHYSICAL EXAM:  GENERAL: NAD, well-developed female   HEAD:  Atraumatic, Normocephalic  NECK: Supple, No JVD  CHEST/LUNG: Clear to auscultation bilaterally; No wheeze, nonlabored breathing  HEART: Regular rate and rhythm; No murmurs, rubs, or gallops  ABDOMEN: Soft, Nontender, Nondistended; Bowel sounds present  EXTREMITIES: No clubbing, cyanosis, or edema  NEUROLOGY: AAOx3, non-focal  PSYCH: calm, appropriate mood    LABS:                        11.6   11.76 )-----------( 496      ( 02 Nov 2024 08:49 )             36.9     11-02    142  |  107  |  14  ----------------------------<  103[H]  3.7   |  30  |  0.68    Ca    8.6      02 Nov 2024 08:49    TPro  7.4  /  Alb  3.3  /  TBili  0.3  /  DBili  x   /  AST  18  /  ALT  20  /  AlkPhos  133[H]  11-01    PT/INR - ( 31 Oct 2024 23:25 )   PT: 12.6 sec;   INR: 1.07 ratio         PTT - ( 31 Oct 2024 23:25 )  PTT:36.7 sec      Urinalysis Basic - ( 02 Nov 2024 08:49 )    Color: x / Appearance: x / SG: x / pH: x  Gluc: 103 mg/dL / Ketone: x  / Bili: x / Urobili: x   Blood: x / Protein: x / Nitrite: x   Leuk Esterase: x / RBC: x / WBC x   Sq Epi: x / Non Sq Epi: x / Bacteria: x        RADIOLOGY & ADDITIONAL TESTS:  Results Reviewed:   Imaging Personally Reviewed:  Electrocardiogram Personally Reviewed:    COORDINATION OF CARE:  Care Discussed with Consultants/Other Providers [Y/N]:  Prior or Outpatient Records Reviewed [Y/N]:

## 2024-11-03 VITALS
RESPIRATION RATE: 15 BRPM | SYSTOLIC BLOOD PRESSURE: 134 MMHG | TEMPERATURE: 99 F | HEART RATE: 90 BPM | OXYGEN SATURATION: 99 % | DIASTOLIC BLOOD PRESSURE: 82 MMHG

## 2024-11-03 LAB
ANION GAP SERPL CALC-SCNC: 6 MMOL/L — SIGNIFICANT CHANGE UP (ref 5–17)
BUN SERPL-MCNC: 22 MG/DL — SIGNIFICANT CHANGE UP (ref 7–23)
CALCIUM SERPL-MCNC: 9 MG/DL — SIGNIFICANT CHANGE UP (ref 8.4–10.5)
CHLORIDE SERPL-SCNC: 106 MMOL/L — SIGNIFICANT CHANGE UP (ref 96–108)
CO2 SERPL-SCNC: 29 MMOL/L — SIGNIFICANT CHANGE UP (ref 22–31)
CREAT SERPL-MCNC: 0.78 MG/DL — SIGNIFICANT CHANGE UP (ref 0.5–1.3)
CULTURE RESULTS: SIGNIFICANT CHANGE UP
EGFR: 93 ML/MIN/1.73M2 — SIGNIFICANT CHANGE UP
GLUCOSE SERPL-MCNC: 81 MG/DL — SIGNIFICANT CHANGE UP (ref 70–99)
HCT VFR BLD CALC: 38.6 % — SIGNIFICANT CHANGE UP (ref 34.5–45)
HGB BLD-MCNC: 12 G/DL — SIGNIFICANT CHANGE UP (ref 11.5–15.5)
MCHC RBC-ENTMCNC: 23.3 PG — LOW (ref 27–34)
MCHC RBC-ENTMCNC: 31.1 G/DL — LOW (ref 32–36)
MCV RBC AUTO: 75 FL — LOW (ref 80–100)
NRBC # BLD: 0 /100 WBCS — SIGNIFICANT CHANGE UP (ref 0–0)
PLATELET # BLD AUTO: 527 K/UL — HIGH (ref 150–400)
POTASSIUM SERPL-MCNC: 4.1 MMOL/L — SIGNIFICANT CHANGE UP (ref 3.5–5.3)
POTASSIUM SERPL-SCNC: 4.1 MMOL/L — SIGNIFICANT CHANGE UP (ref 3.5–5.3)
RBC # BLD: 5.15 M/UL — SIGNIFICANT CHANGE UP (ref 3.8–5.2)
RBC # FLD: 21.8 % — HIGH (ref 10.3–14.5)
S PNEUM AG UR QL: NEGATIVE — SIGNIFICANT CHANGE UP
SODIUM SERPL-SCNC: 141 MMOL/L — SIGNIFICANT CHANGE UP (ref 135–145)
SPECIMEN SOURCE: SIGNIFICANT CHANGE UP
WBC # BLD: 12.31 K/UL — HIGH (ref 3.8–10.5)
WBC # FLD AUTO: 12.31 K/UL — HIGH (ref 3.8–10.5)

## 2024-11-03 PROCEDURE — 0241U: CPT

## 2024-11-03 PROCEDURE — 87798 DETECT AGENT NOS DNA AMP: CPT

## 2024-11-03 PROCEDURE — 86140 C-REACTIVE PROTEIN: CPT

## 2024-11-03 PROCEDURE — 93005 ELECTROCARDIOGRAM TRACING: CPT

## 2024-11-03 PROCEDURE — 87637 SARSCOV2&INF A&B&RSV AMP PRB: CPT

## 2024-11-03 PROCEDURE — 84145 PROCALCITONIN (PCT): CPT

## 2024-11-03 PROCEDURE — 80048 BASIC METABOLIC PNL TOTAL CA: CPT

## 2024-11-03 PROCEDURE — 99285 EMERGENCY DEPT VISIT HI MDM: CPT

## 2024-11-03 PROCEDURE — 83605 ASSAY OF LACTIC ACID: CPT

## 2024-11-03 PROCEDURE — 87077 CULTURE AEROBIC IDENTIFY: CPT

## 2024-11-03 PROCEDURE — 87086 URINE CULTURE/COLONY COUNT: CPT

## 2024-11-03 PROCEDURE — G0378: CPT

## 2024-11-03 PROCEDURE — 36415 COLL VENOUS BLD VENIPUNCTURE: CPT

## 2024-11-03 PROCEDURE — 85610 PROTHROMBIN TIME: CPT

## 2024-11-03 PROCEDURE — 96365 THER/PROPH/DIAG IV INF INIT: CPT

## 2024-11-03 PROCEDURE — 85025 COMPLETE CBC W/AUTO DIFF WBC: CPT

## 2024-11-03 PROCEDURE — 96375 TX/PRO/DX INJ NEW DRUG ADDON: CPT

## 2024-11-03 PROCEDURE — 87899 AGENT NOS ASSAY W/OPTIC: CPT

## 2024-11-03 PROCEDURE — 87651 STREP A DNA AMP PROBE: CPT

## 2024-11-03 PROCEDURE — 85652 RBC SED RATE AUTOMATED: CPT

## 2024-11-03 PROCEDURE — 87040 BLOOD CULTURE FOR BACTERIA: CPT

## 2024-11-03 PROCEDURE — 80053 COMPREHEN METABOLIC PANEL: CPT

## 2024-11-03 PROCEDURE — 85730 THROMBOPLASTIN TIME PARTIAL: CPT

## 2024-11-03 PROCEDURE — 71045 X-RAY EXAM CHEST 1 VIEW: CPT

## 2024-11-03 PROCEDURE — 81003 URINALYSIS AUTO W/O SCOPE: CPT

## 2024-11-03 PROCEDURE — 99239 HOSP IP/OBS DSCHRG MGMT >30: CPT

## 2024-11-03 PROCEDURE — 85027 COMPLETE CBC AUTOMATED: CPT

## 2024-11-03 RX ORDER — LEVOFLOXACIN 750 MG/1
1 TABLET, FILM COATED ORAL
Qty: 5 | Refills: 0
Start: 2024-11-03 | End: 2024-11-07

## 2024-11-03 RX ADMIN — Medication 650 MILLIGRAM(S): at 02:36

## 2024-11-03 RX ADMIN — Medication 25 MILLIGRAM(S): at 02:36

## 2024-11-03 RX ADMIN — HEPARIN SODIUM 5000 UNIT(S): 10000 INJECTION INTRAVENOUS; SUBCUTANEOUS at 06:51

## 2024-11-03 RX ADMIN — HEPARIN SODIUM 5000 UNIT(S): 10000 INJECTION INTRAVENOUS; SUBCUTANEOUS at 13:50

## 2024-11-03 RX ADMIN — Medication 10 MILLIGRAM(S): at 06:52

## 2024-11-03 RX ADMIN — SERTRALINE HYDROCHLORIDE 50 MILLIGRAM(S): 50 TABLET, FILM COATED ORAL at 13:50

## 2024-11-03 NOTE — DISCHARGE NOTE NURSING/CASE MANAGEMENT/SOCIAL WORK - PATIENT PORTAL LINK FT
You can access the FollowMyHealth Patient Portal offered by [facility] by registering at the following website: [email or web address]. By joining [facility]’s FollowMyHealth portal, you will also be able to view your health information using other applications (apps) compatible with our system.

## 2024-11-03 NOTE — DISCHARGE NOTE PROVIDER - NSDCCPCAREPLAN_GEN_ALL_CORE_FT
PRINCIPAL DISCHARGE DIAGNOSIS  Diagnosis: Acute febrile illness  Assessment and Plan of Treatment: you presented with symptoms of cough soare throat. You were admitted for IV Antibiotics which appears to have helped with your symptoms. Infectious work up has been negative but would recommend finishing abx course since you recently had your spleen removed

## 2024-11-03 NOTE — DISCHARGE NOTE PROVIDER - HOSPITAL COURSE
Hospital Course  54 y/o F with a PMHx of HTN, FORREST, Depression, Polycystic kidney disease, PSHx of splenectomy in July presented due to 5 days of pharyngitis, low grade temperature, rhinorrhea, non-productive cough. Pt was admitted as she met criteria for SIRS, however no source identified. Considering hx of Splenectomy, pt was started on Abx. Infectious work up was done which came back negative. Pt's symptoms significantly improved/resolved. Pt stable for disharge home w/ Levaquin x 5 more days.     Source of Infection: Empiric coverage for pt w/ splenectomy  Antibiotic / Last Day: Levaquin 750mg daily x 5 days    Discharging Provider:  Nathan Orr MD  Contact Info: [phone] - Please call with any questions or concerns.    Outpatient Provider: Dr. Robert Leyva

## 2024-11-03 NOTE — DISCHARGE NOTE PROVIDER - PROVIDER TOKENS
FREE:[LAST:[Gordon],FIRST:[Robert],PHONE:[(   )    -],FAX:[(   )    -],ADDRESS:[[address] [place] [address]]]

## 2024-11-03 NOTE — DISCHARGE NOTE PROVIDER - ATTENDING DISCHARGE PHYSICAL EXAMINATION:
GENERAL: NAD, well-developed female   HEAD:  Atraumatic, Normocephalic  NECK: Supple, No JVD  CHEST/LUNG: Clear to auscultation bilaterally; No wheeze, nonlabored breathing  HEART: Regular rate and rhythm; No murmurs, rubs, or gallops  ABDOMEN: Soft, Nontender, Nondistended; Bowel sounds present  EXTREMITIES: No clubbing, cyanosis, or edema  NEUROLOGY: AAOx3, non-focal  PSYCH: calm, appropriate mood

## 2024-11-03 NOTE — DISCHARGE NOTE NURSING/CASE MANAGEMENT/SOCIAL WORK - NSDCVIVACCINE_GEN_ALL_CORE_FT
Hib (PRP-T); 18-Jul-2024 17:12; Asha Christine (RN); [employer]; [id number] (Exp. Date: 08-Nov-2024); IntraMuscular; Deltoid Right.; 0.5 milliLiter(s); VIS (VIS Published: 06-Aug-2021, VIS Presented: 18-Jul-2024);   Meningococcal MCV4O; 18-Jul-2024 17:06; Asha Christine); [employer]; [id number] (Exp. Date: 31-Oct-2024); IntraMuscular; Deltoid Left.; 0.5 milliLiter(s); VIS (VIS Published: 06-Aug-2021, VIS Presented: 18-Jul-2024);   Pneumococcal conjugate vaccine 20-valent (PCV20), polysaccharide [id number] conjugate, adjuvant, preserv; 18-Jul-2024 17:23; Asha Christine); [employer]; [id number] (Exp. Date: 01-Jun-2025); IntraMuscular; Deltoid Right.; 0.5 milliLiter(s); VIS (VIS Published: 12-May-2023, VIS Presented: 18-Jul-2024);

## 2024-11-03 NOTE — DISCHARGE NOTE NURSING/CASE MANAGEMENT/SOCIAL WORK - NSDCPEFALRISK_GEN_ALL_CORE
For information on Fall & Injury Prevention, visit: [email or web address] OR  [email or web address] OR  https://www.cdc.gov/steadi/patient.html

## 2024-11-03 NOTE — DISCHARGE NOTE NURSING/CASE MANAGEMENT/SOCIAL WORK - NSDCFUADDAPPT_GEN_ALL_CORE_FT
We will call you in the beginning of next week with a hospital followup appointment with Dr. Robert Leyva, office #[phone].

## 2024-11-06 LAB
CULTURE RESULTS: SIGNIFICANT CHANGE UP
CULTURE RESULTS: SIGNIFICANT CHANGE UP
SPECIMEN SOURCE: SIGNIFICANT CHANGE UP
SPECIMEN SOURCE: SIGNIFICANT CHANGE UP

## 2024-12-21 ENCOUNTER — EMERGENCY (EMERGENCY)
Facility: HOSPITAL | Age: 54
LOS: 1 days | Discharge: HOME | End: 2024-12-21
Attending: INTERNAL MEDICINE | Admitting: INTERNAL MEDICINE
Payer: COMMERCIAL

## 2024-12-21 VITALS
WEIGHT: 233.91 LBS | TEMPERATURE: 100 F | RESPIRATION RATE: 18 BRPM | DIASTOLIC BLOOD PRESSURE: 82 MMHG | SYSTOLIC BLOOD PRESSURE: 144 MMHG | OXYGEN SATURATION: 98 % | HEART RATE: 80 BPM | HEIGHT: 70 IN

## 2024-12-21 VITALS
HEART RATE: 70 BPM | OXYGEN SATURATION: 98 % | SYSTOLIC BLOOD PRESSURE: 132 MMHG | DIASTOLIC BLOOD PRESSURE: 69 MMHG | RESPIRATION RATE: 16 BRPM

## 2024-12-21 DIAGNOSIS — Z90.81 ACQUIRED ABSENCE OF SPLEEN: Chronic | ICD-10-CM

## 2024-12-21 DIAGNOSIS — Z98.890 OTHER SPECIFIED POSTPROCEDURAL STATES: Chronic | ICD-10-CM

## 2024-12-21 DIAGNOSIS — Z92.89 PERSONAL HISTORY OF OTHER MEDICAL TREATMENT: Chronic | ICD-10-CM

## 2024-12-21 LAB
ALBUMIN SERPL ELPH-MCNC: 3.1 G/DL — LOW (ref 3.3–5)
ALP SERPL-CCNC: 102 U/L — SIGNIFICANT CHANGE UP (ref 40–120)
ALT FLD-CCNC: 20 U/L — SIGNIFICANT CHANGE UP (ref 10–45)
ANION GAP SERPL CALC-SCNC: 7 MMOL/L — SIGNIFICANT CHANGE UP (ref 5–17)
APTT BLD: 34.6 SEC — SIGNIFICANT CHANGE UP (ref 24.5–35.6)
AST SERPL-CCNC: 21 U/L — SIGNIFICANT CHANGE UP (ref 10–40)
BASOPHILS # BLD AUTO: 0.06 K/UL — SIGNIFICANT CHANGE UP (ref 0–0.2)
BASOPHILS NFR BLD AUTO: 0.9 % — SIGNIFICANT CHANGE UP (ref 0–2)
BILIRUB SERPL-MCNC: 0.3 MG/DL — SIGNIFICANT CHANGE UP (ref 0.2–1.2)
BUN SERPL-MCNC: 17 MG/DL — SIGNIFICANT CHANGE UP (ref 7–23)
CALCIUM SERPL-MCNC: 8.7 MG/DL — SIGNIFICANT CHANGE UP (ref 8.4–10.5)
CHLORIDE SERPL-SCNC: 109 MMOL/L — HIGH (ref 96–108)
CO2 SERPL-SCNC: 29 MMOL/L — SIGNIFICANT CHANGE UP (ref 22–31)
CREAT SERPL-MCNC: 0.81 MG/DL — SIGNIFICANT CHANGE UP (ref 0.5–1.3)
EGFR: 87 ML/MIN/1.73M2 — SIGNIFICANT CHANGE UP
EOSINOPHIL # BLD AUTO: 0.15 K/UL — SIGNIFICANT CHANGE UP (ref 0–0.5)
EOSINOPHIL NFR BLD AUTO: 2.2 % — SIGNIFICANT CHANGE UP (ref 0–6)
FLUAV AG NPH QL: SIGNIFICANT CHANGE UP
FLUBV AG NPH QL: SIGNIFICANT CHANGE UP
GLUCOSE SERPL-MCNC: 123 MG/DL — HIGH (ref 70–99)
HCT VFR BLD CALC: 36.9 % — SIGNIFICANT CHANGE UP (ref 34.5–45)
HGB BLD-MCNC: 11.9 G/DL — SIGNIFICANT CHANGE UP (ref 11.5–15.5)
IMM GRANULOCYTES NFR BLD AUTO: 0.3 % — SIGNIFICANT CHANGE UP (ref 0–0.9)
INR BLD: 1.18 RATIO — HIGH (ref 0.85–1.16)
LACTATE SERPL-SCNC: 1.7 MMOL/L — SIGNIFICANT CHANGE UP (ref 0.7–2)
LYMPHOCYTES # BLD AUTO: 0.95 K/UL — LOW (ref 1–3.3)
LYMPHOCYTES # BLD AUTO: 14.1 % — SIGNIFICANT CHANGE UP (ref 13–44)
MCHC RBC-ENTMCNC: 25 PG — LOW (ref 27–34)
MCHC RBC-ENTMCNC: 32.2 G/DL — SIGNIFICANT CHANGE UP (ref 32–36)
MCV RBC AUTO: 77.5 FL — LOW (ref 80–100)
MONOCYTES # BLD AUTO: 0.61 K/UL — SIGNIFICANT CHANGE UP (ref 0–0.9)
MONOCYTES NFR BLD AUTO: 9 % — SIGNIFICANT CHANGE UP (ref 2–14)
NEUTROPHILS # BLD AUTO: 4.97 K/UL — SIGNIFICANT CHANGE UP (ref 1.8–7.4)
NEUTROPHILS NFR BLD AUTO: 73.5 % — SIGNIFICANT CHANGE UP (ref 43–77)
NRBC # BLD: 0 /100 WBCS — SIGNIFICANT CHANGE UP (ref 0–0)
PLATELET # BLD AUTO: 457 K/UL — HIGH (ref 150–400)
POTASSIUM SERPL-MCNC: 3.9 MMOL/L — SIGNIFICANT CHANGE UP (ref 3.5–5.3)
POTASSIUM SERPL-SCNC: 3.9 MMOL/L — SIGNIFICANT CHANGE UP (ref 3.5–5.3)
PROCALCITONIN SERPL-MCNC: 0.05 NG/ML — SIGNIFICANT CHANGE UP
PROT SERPL-MCNC: 6.8 G/DL — SIGNIFICANT CHANGE UP (ref 6–8.3)
PROTHROM AB SERPL-ACNC: 13.9 SEC — HIGH (ref 9.9–13.4)
RBC # BLD: 4.76 M/UL — SIGNIFICANT CHANGE UP (ref 3.8–5.2)
RBC # FLD: 19 % — HIGH (ref 10.3–14.5)
RSV RNA NPH QL NAA+NON-PROBE: SIGNIFICANT CHANGE UP
SARS-COV-2 RNA SPEC QL NAA+PROBE: SIGNIFICANT CHANGE UP
SODIUM SERPL-SCNC: 145 MMOL/L — SIGNIFICANT CHANGE UP (ref 135–145)
WBC # BLD: 6.76 K/UL — SIGNIFICANT CHANGE UP (ref 3.8–10.5)
WBC # FLD AUTO: 6.76 K/UL — SIGNIFICANT CHANGE UP (ref 3.8–10.5)

## 2024-12-21 PROCEDURE — 85610 PROTHROMBIN TIME: CPT

## 2024-12-21 PROCEDURE — 36415 COLL VENOUS BLD VENIPUNCTURE: CPT

## 2024-12-21 PROCEDURE — 83605 ASSAY OF LACTIC ACID: CPT

## 2024-12-21 PROCEDURE — 80053 COMPREHEN METABOLIC PANEL: CPT

## 2024-12-21 PROCEDURE — 99285 EMERGENCY DEPT VISIT HI MDM: CPT

## 2024-12-21 PROCEDURE — 87637 SARSCOV2&INF A&B&RSV AMP PRB: CPT

## 2024-12-21 PROCEDURE — 93010 ELECTROCARDIOGRAM REPORT: CPT

## 2024-12-21 PROCEDURE — 84145 PROCALCITONIN (PCT): CPT

## 2024-12-21 PROCEDURE — 87040 BLOOD CULTURE FOR BACTERIA: CPT

## 2024-12-21 PROCEDURE — 71045 X-RAY EXAM CHEST 1 VIEW: CPT

## 2024-12-21 PROCEDURE — 71045 X-RAY EXAM CHEST 1 VIEW: CPT | Mod: 26

## 2024-12-21 PROCEDURE — 99285 EMERGENCY DEPT VISIT HI MDM: CPT | Mod: 25

## 2024-12-21 PROCEDURE — 85730 THROMBOPLASTIN TIME PARTIAL: CPT

## 2024-12-21 PROCEDURE — 96374 THER/PROPH/DIAG INJ IV PUSH: CPT

## 2024-12-21 PROCEDURE — 85025 COMPLETE CBC W/AUTO DIFF WBC: CPT

## 2024-12-21 PROCEDURE — 93005 ELECTROCARDIOGRAM TRACING: CPT

## 2024-12-21 RX ORDER — SODIUM CHLORIDE 9 MG/ML
2100 INJECTION, SOLUTION INTRAMUSCULAR; INTRAVENOUS; SUBCUTANEOUS ONCE
Refills: 0 | Status: COMPLETED | OUTPATIENT
Start: 2024-12-21 | End: 2024-12-21

## 2024-12-21 RX ORDER — LEVOFLOXACIN 250 MG/1
1 TABLET, FILM COATED ORAL
Qty: 10 | Refills: 0
Start: 2024-12-21 | End: 2024-12-30

## 2024-12-21 RX ADMIN — SODIUM CHLORIDE 2100 MILLILITER(S): 9 INJECTION, SOLUTION INTRAMUSCULAR; INTRAVENOUS; SUBCUTANEOUS at 15:37

## 2024-12-21 NOTE — ED PROVIDER NOTE - PHYSICAL EXAMINATION
General:     NAD, well-nourished, well-appearing  Head:     NC/AT, EOMI, oral mucosa moist  ENT bilateral nasal congestion  Neck:     trachea midline  Lungs:     CTA b/l, no w/r/r  CVS:     S1S2, RRR, no m/g/r  Abd:     +BS, s/nt/nd, no organomegaly  Ext:    2+ radial and pedal pulses, no c/c/e  Neuro: AAOx3, no sensory/motor deficits

## 2024-12-21 NOTE — ED ADULT NURSE NOTE - OBJECTIVE STATEMENT
Patient received A&Ox4, ambulatory with steady gait at the present. Patient complains of new onset of flu like symptoms. Patient endorsing fever, cough, body aches. Patient states symptoms have persisted x4 days. Side rails up, call light in reach, safety maintained, comfort measures provided and MD evaluation in progress.

## 2024-12-21 NOTE — ED PROVIDER NOTE - NSICDXFAMILYHX_GEN_ALL_CORE_FT
FAMILY HISTORY:  Father  Still living? Unknown  Family history of polycystic kidney disease, Age at diagnosis: Age Unknown    Mother  Still living? Unknown  FH: type 2 diabetes, Age at diagnosis: Age Unknown  FHx: hypertension, Age at diagnosis: Age Unknown

## 2024-12-21 NOTE — ED PROVIDER NOTE - OBJECTIVE STATEMENT
54-year-old female history of splenectomy came to the emergency room chief complaint of fever chills bodyaches nasal congestion cough patient has a history of sepsis in the past

## 2024-12-21 NOTE — ED PROVIDER NOTE - PATIENT PORTAL LINK FT
You can access the FollowMyHealth Patient Portal offered by [facility] by registering at the following website: [email or web address]. By joining [facility]’s FollowMyHealth portal, you will also be able to view your health information using other applications (apps) compatible with our system.

## 2024-12-21 NOTE — ED PROVIDER NOTE - NSFOLLOWUPINSTRUCTIONS_ED_ALL_ED_FT
Follow up with your PMD within 1-2 days.  Rest, increase your fluids, advance your activity as tolerated.   Take all of your other medications as previously prescribed.   Worsening, continued or ANY new concerning symptoms return to the emergency department.  Tylenol for pain oral hydration Levaquin once a day for 10 days

## 2024-12-21 NOTE — ED PROVIDER NOTE - CLINICAL SUMMARY MEDICAL DECISION MAKING FREE TEXT BOX
54-year-old female history of splenectomy came to the emergency room chief complaint of fever chills bodyaches nasal congestion cough patient has a history of sepsis in the past  X-ray questionable right lower lobe airspace disease patient treated with the Levaquin IV IV fluids full sepsis panel was done plan to discharge the patient with Levaquin and follow-up outpatient

## 2025-01-03 NOTE — ED ADULT TRIAGE NOTE - NS ED NURSE BANDS TYPE
[place] Lips Filler Volume In Cc: 0 Anesthesia Type: 1% lidocaine with epinephrine Anesthesia Volume In Cc: 0.5 Additional Anesthesia Volume In Cc: 6 Post-Care Instructions: Patient instructed to apply ice to reduce swelling. Price (Use Numbers Only, No Special Characters Or $): 1000 Filler: [place]ill Detail Level: Detailed Consent: Written consent obtained. Risks include but not limited to bruising, beading, irregular texture, ulceration, infection, allergic reaction, scar formation, incomplete augmentation, temporary nature, procedural pain. Quantity Per Injection Site (Cc): 1.0 cc Name band;

## 2025-01-25 ENCOUNTER — NON-APPOINTMENT (OUTPATIENT)
Age: 55
End: 2025-01-25

## 2025-02-11 ENCOUNTER — EMERGENCY (EMERGENCY)
Facility: HOSPITAL | Age: 55
LOS: 1 days | Discharge: HOME | End: 2025-02-11
Attending: STUDENT IN AN ORGANIZED HEALTH CARE EDUCATION/TRAINING PROGRAM | Admitting: STUDENT IN AN ORGANIZED HEALTH CARE EDUCATION/TRAINING PROGRAM
Payer: COMMERCIAL

## 2025-02-11 VITALS
RESPIRATION RATE: 18 BRPM | TEMPERATURE: 99 F | WEIGHT: 231.93 LBS | HEART RATE: 106 BPM | HEIGHT: 70 IN | OXYGEN SATURATION: 98 % | SYSTOLIC BLOOD PRESSURE: 138 MMHG | DIASTOLIC BLOOD PRESSURE: 74 MMHG

## 2025-02-11 VITALS
DIASTOLIC BLOOD PRESSURE: 77 MMHG | RESPIRATION RATE: 16 BRPM | OXYGEN SATURATION: 96 % | HEART RATE: 89 BPM | TEMPERATURE: 100 F | SYSTOLIC BLOOD PRESSURE: 146 MMHG

## 2025-02-11 DIAGNOSIS — Z90.81 ACQUIRED ABSENCE OF SPLEEN: Chronic | ICD-10-CM

## 2025-02-11 DIAGNOSIS — Z92.89 PERSONAL HISTORY OF OTHER MEDICAL TREATMENT: Chronic | ICD-10-CM

## 2025-02-11 DIAGNOSIS — Z98.890 OTHER SPECIFIED POSTPROCEDURAL STATES: Chronic | ICD-10-CM

## 2025-02-11 LAB
ALBUMIN SERPL ELPH-MCNC: 3.5 G/DL — SIGNIFICANT CHANGE UP (ref 3.3–5)
ALP SERPL-CCNC: 115 U/L — SIGNIFICANT CHANGE UP (ref 40–120)
ALT FLD-CCNC: 21 U/L — SIGNIFICANT CHANGE UP (ref 10–45)
ANION GAP SERPL CALC-SCNC: 9 MMOL/L — SIGNIFICANT CHANGE UP (ref 5–17)
AST SERPL-CCNC: 20 U/L — SIGNIFICANT CHANGE UP (ref 10–40)
BASOPHILS # BLD AUTO: 0.07 K/UL — SIGNIFICANT CHANGE UP (ref 0–0.2)
BASOPHILS NFR BLD AUTO: 0.8 % — SIGNIFICANT CHANGE UP (ref 0–2)
BILIRUB SERPL-MCNC: 0.4 MG/DL — SIGNIFICANT CHANGE UP (ref 0.2–1.2)
BUN SERPL-MCNC: 14 MG/DL — SIGNIFICANT CHANGE UP (ref 7–23)
CALCIUM SERPL-MCNC: 9 MG/DL — SIGNIFICANT CHANGE UP (ref 8.4–10.5)
CHLORIDE SERPL-SCNC: 106 MMOL/L — SIGNIFICANT CHANGE UP (ref 96–108)
CO2 SERPL-SCNC: 27 MMOL/L — SIGNIFICANT CHANGE UP (ref 22–31)
CREAT SERPL-MCNC: 0.79 MG/DL — SIGNIFICANT CHANGE UP (ref 0.5–1.3)
EGFR: 90 ML/MIN/1.73M2 — SIGNIFICANT CHANGE UP
EOSINOPHIL # BLD AUTO: 0.14 K/UL — SIGNIFICANT CHANGE UP (ref 0–0.5)
EOSINOPHIL NFR BLD AUTO: 1.6 % — SIGNIFICANT CHANGE UP (ref 0–6)
FLUAV AG NPH QL: DETECTED
FLUBV AG NPH QL: SIGNIFICANT CHANGE UP
GLUCOSE SERPL-MCNC: 125 MG/DL — HIGH (ref 70–99)
HCT VFR BLD CALC: 39.3 % — SIGNIFICANT CHANGE UP (ref 34.5–45)
HGB BLD-MCNC: 13 G/DL — SIGNIFICANT CHANGE UP (ref 11.5–15.5)
IMM GRANULOCYTES NFR BLD AUTO: 0.4 % — SIGNIFICANT CHANGE UP (ref 0–0.9)
LYMPHOCYTES # BLD AUTO: 0.67 K/UL — LOW (ref 1–3.3)
LYMPHOCYTES # BLD AUTO: 7.8 % — LOW (ref 13–44)
MCHC RBC-ENTMCNC: 25.4 PG — LOW (ref 27–34)
MCHC RBC-ENTMCNC: 33.1 G/DL — SIGNIFICANT CHANGE UP (ref 32–36)
MCV RBC AUTO: 76.8 FL — LOW (ref 80–100)
MONOCYTES # BLD AUTO: 0.69 K/UL — SIGNIFICANT CHANGE UP (ref 0–0.9)
MONOCYTES NFR BLD AUTO: 8.1 % — SIGNIFICANT CHANGE UP (ref 2–14)
NEUTROPHILS # BLD AUTO: 6.95 K/UL — SIGNIFICANT CHANGE UP (ref 1.8–7.4)
NEUTROPHILS NFR BLD AUTO: 81.3 % — HIGH (ref 43–77)
NRBC BLD AUTO-RTO: 0 /100 WBCS — SIGNIFICANT CHANGE UP (ref 0–0)
PLATELET # BLD AUTO: 473 K/UL — HIGH (ref 150–400)
POTASSIUM SERPL-MCNC: 3.6 MMOL/L — SIGNIFICANT CHANGE UP (ref 3.5–5.3)
POTASSIUM SERPL-SCNC: 3.6 MMOL/L — SIGNIFICANT CHANGE UP (ref 3.5–5.3)
PROT SERPL-MCNC: 7.5 G/DL — SIGNIFICANT CHANGE UP (ref 6–8.3)
RBC # BLD: 5.12 M/UL — SIGNIFICANT CHANGE UP (ref 3.8–5.2)
RBC # FLD: 18.2 % — HIGH (ref 10.3–14.5)
RSV RNA NPH QL NAA+NON-PROBE: SIGNIFICANT CHANGE UP
SARS-COV-2 RNA SPEC QL NAA+PROBE: SIGNIFICANT CHANGE UP
SODIUM SERPL-SCNC: 142 MMOL/L — SIGNIFICANT CHANGE UP (ref 135–145)
WBC # BLD: 8.55 K/UL — SIGNIFICANT CHANGE UP (ref 3.8–10.5)
WBC # FLD AUTO: 8.55 K/UL — SIGNIFICANT CHANGE UP (ref 3.8–10.5)

## 2025-02-11 PROCEDURE — 80053 COMPREHEN METABOLIC PANEL: CPT

## 2025-02-11 PROCEDURE — 99284 EMERGENCY DEPT VISIT MOD MDM: CPT

## 2025-02-11 PROCEDURE — 36415 COLL VENOUS BLD VENIPUNCTURE: CPT

## 2025-02-11 PROCEDURE — 99284 EMERGENCY DEPT VISIT MOD MDM: CPT | Mod: 25

## 2025-02-11 PROCEDURE — 85025 COMPLETE CBC W/AUTO DIFF WBC: CPT

## 2025-02-11 PROCEDURE — 96374 THER/PROPH/DIAG INJ IV PUSH: CPT

## 2025-02-11 PROCEDURE — 87637 SARSCOV2&INF A&B&RSV AMP PRB: CPT

## 2025-02-11 RX ORDER — ACETAMINOPHEN 160 MG/5ML
1000 SUSPENSION ORAL ONCE
Refills: 0 | Status: COMPLETED | OUTPATIENT
Start: 2025-02-11 | End: 2025-02-11

## 2025-02-11 RX ORDER — BACTERIOSTATIC SODIUM CHLORIDE 0.9 %
1000 VIAL (ML) INJECTION ONCE
Refills: 0 | Status: COMPLETED | OUTPATIENT
Start: 2025-02-11 | End: 2025-02-11

## 2025-02-11 RX ORDER — OSELTAMIVIR PHOSPHATE 75 MG/1
1 CAPSULE ORAL
Qty: 10 | Refills: 0
Start: 2025-02-11 | End: 2025-02-15

## 2025-02-11 RX ADMIN — ACETAMINOPHEN 400 MILLIGRAM(S): 160 SUSPENSION ORAL at 18:31

## 2025-02-11 RX ADMIN — Medication 2000 MILLILITER(S): at 18:32

## 2025-02-11 NOTE — ED ADULT TRIAGE NOTE - CHIEF COMPLAINT QUOTE
Patient complaint of flu-like symptoms since yesterday. Patient complaint of cough, cold, body aches and fever. Patient was with positive Flu member and got Flu shot yesterday

## 2025-02-11 NOTE — ED ADULT NURSE NOTE - HISTORY OF COVID-19 VACCINATION
[Follow-Up Visit] : a follow-up visit for [Family Member] : family member [FreeTextEntry2] : iron deficiency anemia during second trimester of pregnancy Vaccine status unknown

## 2025-02-11 NOTE — ED PROVIDER NOTE - PATIENT PORTAL LINK FT
You can access the FollowMyHealth Patient Portal offered by [facility] by registering at the following website: [email or web address]. By joining [facility]’s FollowMyHealth portal, you will also be able to view your health information using other applications (apps) compatible with our system.

## 2025-02-11 NOTE — ED PROVIDER NOTE - CLINICAL SUMMARY MEDICAL DECISION MAKING FREE TEXT BOX
54-year-old female with history of splenectomy presented to the ED with complaints of flulike symptoms, patient reports she had flu vaccine yesterday, patient also reports positive sick contact with someone who had the flu, patient reports fever chills body aches nonproductive cough congestion since yesterday.  Patient without reported nausea vomiting diarrhea denies any chest pain or shortness of breath otherwise well-appearing, likely post flu vaccine related symptoms versus influenza infection, we will conduct a flu COVID vaccine, patient also reports she feels dehydrated and requesting IV fluid.  Will check screening labs and give IV fluid bolus.

## 2025-03-11 NOTE — ED ADULT TRIAGE NOTE - RESPIRATORY RATE (BREATHS/MIN)
Patient is a 32 year old M to F domiciled with friend in [place], employed as , reports pphx of anxiety, last  admission for anxiety reported to be 1-2 months ago, follows with psychiatrist and currently taking Zoloft, presents with complaints of anxiety and thoughts of suicide. Telepsychiatry consulted for evaluation.    Upon approach patient is calm, mostly cooperative but appears somewhat lethargic. She reports that she has been feeling "a little depressed because I'm really stressed out; I can't explain it." Patient reports that stress is due to her future and is worried about a relationship. Patient states that she lives in  with her best friend Sandra, but was in  for a date. She had a panic attack during the date and was brought to the hospital. 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 Patient states that she usually feels like this when she is on substances. Patient denies any active thoughts of suicide with plan or intent. She states that she has had passive thoughts, like not wanting to live anymore or not wanting to be alive anymore. Patient reports that she has thought about "taking more drugs to harm self" but has no intention or plan at this time.  Patient denies any HI/AVH. she reports that she is looking for referrals for rehab. Patient states that she is in touch with her dad who lives in .
18

## 2025-03-27 ENCOUNTER — APPOINTMENT (OUTPATIENT)
Dept: OBGYN | Facility: CLINIC | Age: 55
End: 2025-03-27

## 2025-04-01 ENCOUNTER — NON-APPOINTMENT (OUTPATIENT)
Age: 55
End: 2025-04-01

## 2025-04-04 ENCOUNTER — NON-APPOINTMENT (OUTPATIENT)
Age: 55
End: 2025-04-04

## 2025-04-10 NOTE — DISCHARGE NOTE NURSING/CASE MANAGEMENT/SOCIAL WORK - FINANCIAL ASSISTANCE
Detail Level: Zone
[facility] provides services at a reduced cost to those who are determined to be eligible through [facility]’s financial assistance program. Information regarding [facility]’s financial assistance program can be found by going to [email or web address] or by calling [phone].

## 2025-06-10 ENCOUNTER — NON-APPOINTMENT (OUTPATIENT)
Age: 55
End: 2025-06-10

## 2025-06-11 NOTE — ED PROVIDER NOTE - OBJECTIVE STATEMENT
pt seen here earlier, c/o R upper back pain that is worse with breathing, occurred upon waking up yesterday, was treated in the ED as muscle spam and pt improved, pt returns now stating that the spasm has returned but not as severe.  pt had a negative d-dimer and trop and normal cbc, cmp. cxr was normal as well. pt requesting to sleep in ED.
yes

## 2025-07-03 ENCOUNTER — APPOINTMENT (OUTPATIENT)
Dept: OBGYN | Facility: CLINIC | Age: 55
End: 2025-07-03

## 2025-07-03 VITALS
WEIGHT: 229 LBS | DIASTOLIC BLOOD PRESSURE: 74 MMHG | OXYGEN SATURATION: 98 % | TEMPERATURE: 97.3 F | SYSTOLIC BLOOD PRESSURE: 124 MMHG | BODY MASS INDEX: 43.23 KG/M2 | HEART RATE: 72 BPM | HEIGHT: 61 IN

## 2025-07-03 PROBLEM — Z12.4 SCREENING FOR MALIGNANT NEOPLASM OF CERVIX: Status: ACTIVE | Noted: 2025-07-03

## 2025-07-03 PROBLEM — N89.8 VAGINAL IRRITATION: Status: ACTIVE | Noted: 2025-07-03

## 2025-07-03 PROBLEM — D25.9 UTERINE FIBROID: Status: ACTIVE | Noted: 2025-07-03

## 2025-07-03 PROCEDURE — 99396 PREV VISIT EST AGE 40-64: CPT

## 2025-07-13 LAB
CANDIDA VAG CYTO: NOT DETECTED
CYTOLOGY CVX/VAG DOC THIN PREP: NORMAL
G VAGINALIS+PREV SP MTYP VAG QL MICRO: NOT DETECTED
HPV HIGH+LOW RISK DNA PNL CVX: NOT DETECTED
T VAGINALIS VAG QL WET PREP: NOT DETECTED

## (undated) DEVICE — WARMING BLANKET UPPER ADULT

## (undated) DEVICE — ENDOCATCH II 15MM

## (undated) DEVICE — XI ARM FORCEP TENACULUM

## (undated) DEVICE — VENODYNE/SCD SLEEVE CALF MEDIUM

## (undated) DEVICE — TUBING STRYKEFLOW II SUCTION / IRRIGATOR

## (undated) DEVICE — DRSG OPSITE 13.75 X 4"

## (undated) DEVICE — SUT MAXON 1 96" T-60

## (undated) DEVICE — XI ARM SCISSOR MONO CURVED

## (undated) DEVICE — GLV 8.5 PROTEXIS (WHITE)

## (undated) DEVICE — XI DRAPE ARM

## (undated) DEVICE — DRAIN RESERVOIR FOR JACKSON PRATT 100CC CARDINAL

## (undated) DEVICE — FOLEY TRAY 16FR 5CC LTX UMETER CLOSED

## (undated) DEVICE — POSITIONER FOAM EGG CRATE ULNAR 2PCS (PINK)

## (undated) DEVICE — TUBING AIRSEAL TRI-LUMEN FILTERED

## (undated) DEVICE — DRAPE INSTRUMENT POUCH 6.75" X 11"

## (undated) DEVICE — XI ARM CLIP APPLIER LARGE

## (undated) DEVICE — XI SHEARS HARMONIC CVD 8MM

## (undated) DEVICE — DRAPE IOBAN 23" X 23"

## (undated) DEVICE — TROCAR SURGIQUEST AIRSEAL 12MMX100MM

## (undated) DEVICE — GLV 7.5 PROTEXIS (WHITE)

## (undated) DEVICE — XI TIP COVER

## (undated) DEVICE — POSITIONER PURPLE ARM ONE STEP (LARGE)

## (undated) DEVICE — DRSG STERISTRIPS 0.5 X 4"

## (undated) DEVICE — XI ARM FORCEP FENESTRATED BIPOLAR 8MM

## (undated) DEVICE — XI ARM NEEDLE DRIVER SUTURECUT MEGA 8MM

## (undated) DEVICE — XI ARM PERMANENT CAUTERY HOOK

## (undated) DEVICE — GLV 8 PROTEXIS (WHITE)

## (undated) DEVICE — PROBE LAP ARGON BEAM 5MM

## (undated) DEVICE — SUT BIOSYN 4-0 18" P-12

## (undated) DEVICE — XI SEAL UNIVERSIAL 5-12MM

## (undated) DEVICE — D HELP - CLEARVIEW CLEARIFY SYSTEM

## (undated) DEVICE — XI OBTURATOR OPTICAL BLADELESS 8MM

## (undated) DEVICE — TUBING SUCTION 20FT

## (undated) DEVICE — SOL IRR POUR H2O 250ML

## (undated) DEVICE — LIJ/LIA-ESU ERBE 11359931: Type: DURABLE MEDICAL EQUIPMENT

## (undated) DEVICE — XI STAPLER SUREFORM 60

## (undated) DEVICE — WARMING BLANKET LOWER ADULT

## (undated) DEVICE — POSITIONER PINK PAD PIGAZZI SYSTEM

## (undated) DEVICE — ENDOCATCH GENERAL 15MM (PURPLE)

## (undated) DEVICE — VALVE YELLOW PORT SEAL PLUS 5MM

## (undated) DEVICE — TUBING INSUFFLATION LAP FILTER 10FT

## (undated) DEVICE — SPECIMEN CONTAINER 100ML

## (undated) DEVICE — Device

## (undated) DEVICE — HANDSET ARGON

## (undated) DEVICE — XI ARM DISSECTOR CURVED BIPOLAR 8MM

## (undated) DEVICE — APPLICATOR VISTASEAL LAP DUAL 45CM FLEX

## (undated) DEVICE — DRAPE MAYO STAND 30"

## (undated) DEVICE — GLV 7 PROTEXIS (WHITE)

## (undated) DEVICE — BASIN SET SINGLE

## (undated) DEVICE — XI ARM NEEDLE DRIVER LARGE

## (undated) DEVICE — GLV 6.5 PROTEXIS (WHITE)

## (undated) DEVICE — SOL IRR POUR NS 0.9% 500ML

## (undated) DEVICE — SCOPE WARMER SEAL DISP

## (undated) DEVICE — XI VESSEL SEALER

## (undated) DEVICE — STAPLER COVIDIEN ENDO GIA STANDARD HANDLE

## (undated) DEVICE — TROCAR APPLIED MEDICAL KII BALLOON BLUNT TIP 12MM X 100MM

## (undated) DEVICE — XI ARM CLIP APPLIER MEDIUM-LARGE

## (undated) DEVICE — TROCAR SURGIQUEST AIRSEAL 5MM X 100MM

## (undated) DEVICE — PACK ROBOTIC LIJ

## (undated) DEVICE — BLADE SCALPEL SAFETYLOCK #10

## (undated) DEVICE — SUT PROLENE 4-0 36" SH

## (undated) DEVICE — XI DRAPE COLUMN

## (undated) DEVICE — DRAPE LIGHT HANDLE COVER (BLUE)

## (undated) DEVICE — SUT POLYSORB 0 60" TIES UNDYED

## (undated) DEVICE — XI ARM GRASPER TIP UP FENESTRATED

## (undated) DEVICE — XI ARM FORCEP PROGRASP 8MM

## (undated) DEVICE — XI ARM PERMANENT CAUTERY SPATULA

## (undated) DEVICE — POSITIONER FOAM HEAD CRADLE (PINK)

## (undated) DEVICE — ELCTR BOVIE PENCIL SMOKE EVACUATION

## (undated) DEVICE — TROCAR COVIDIEN VERSASTEP PLUS 15MM STANDARD

## (undated) DEVICE — XI ARM FORCEP MARYLAND BIPOLAR